# Patient Record
Sex: FEMALE | Race: WHITE | NOT HISPANIC OR LATINO | Employment: OTHER | URBAN - METROPOLITAN AREA
[De-identification: names, ages, dates, MRNs, and addresses within clinical notes are randomized per-mention and may not be internally consistent; named-entity substitution may affect disease eponyms.]

---

## 2017-09-05 ENCOUNTER — TRANSFERRED RECORDS (OUTPATIENT)
Dept: HEALTH INFORMATION MANAGEMENT | Facility: CLINIC | Age: 67
End: 2017-09-05

## 2017-11-01 ENCOUNTER — TRANSFERRED RECORDS (OUTPATIENT)
Dept: HEALTH INFORMATION MANAGEMENT | Facility: CLINIC | Age: 67
End: 2017-11-01

## 2017-11-22 DIAGNOSIS — L66.12 FRONTAL FIBROSING ALOPECIA: ICD-10-CM

## 2017-11-22 NOTE — TELEPHONE ENCOUNTER
Refill request received  for Finasteride. Unable to refill per RN refill protocol.    Jessie Philip RN

## 2017-11-22 NOTE — TELEPHONE ENCOUNTER
Per 5/30 chart note:    FFA  -Rogaine 5% QHS, foam, uses about twice a week  -Start finasteride 1mg QDay - reprots it may be working - would like to continue

## 2017-11-27 RX ORDER — FINASTERIDE 1 MG/1
1 TABLET, FILM COATED ORAL DAILY
Qty: 90 TABLET | Refills: 0 | Status: SHIPPED | OUTPATIENT
Start: 2017-11-27 | End: 2018-02-05

## 2017-11-28 NOTE — TELEPHONE ENCOUNTER
Received refill request for finasteride.  Chart reviewed.  Last note 5/2017 documents plan for conintued use.  Will renew with enough to last until next follow up 2/2018.    Radha Herndon MD  PGY-4, Dermatology

## 2018-01-18 ENCOUNTER — TELEPHONE (OUTPATIENT)
Dept: FAMILY MEDICINE | Facility: CLINIC | Age: 68
End: 2018-01-18

## 2018-01-18 NOTE — TELEPHONE ENCOUNTER
Patient called today.    Patient is requesting an appointment for provider Hector Thorne MD at [facility] for a 3 month followup.    Patient will be filling out BREA.    Please contact patient.    Thank you.    Central Scheduling  Alea SHELTON

## 2018-01-31 ENCOUNTER — TRANSFERRED RECORDS (OUTPATIENT)
Dept: HEALTH INFORMATION MANAGEMENT | Facility: CLINIC | Age: 68
End: 2018-01-31

## 2018-02-05 ENCOUNTER — OFFICE VISIT (OUTPATIENT)
Dept: DERMATOLOGY | Facility: CLINIC | Age: 68
End: 2018-02-05
Payer: COMMERCIAL

## 2018-02-05 VITALS — DIASTOLIC BLOOD PRESSURE: 84 MMHG | SYSTOLIC BLOOD PRESSURE: 164 MMHG | HEART RATE: 70 BPM

## 2018-02-05 DIAGNOSIS — L57.0 AK (ACTINIC KERATOSIS): ICD-10-CM

## 2018-02-05 DIAGNOSIS — R03.0 ELEVATED BLOOD PRESSURE READING: ICD-10-CM

## 2018-02-05 DIAGNOSIS — L66.12 FRONTAL FIBROSING ALOPECIA: Primary | ICD-10-CM

## 2018-02-05 DIAGNOSIS — L66.10 LICHEN PLANOPILARIS: ICD-10-CM

## 2018-02-05 RX ORDER — FINASTERIDE 1 MG/1
1 TABLET, FILM COATED ORAL DAILY
Qty: 90 TABLET | Refills: 1 | Status: SHIPPED | OUTPATIENT
Start: 2018-02-05 | End: 2018-08-29

## 2018-02-05 RX ORDER — TACROLIMUS 1 MG/G
OINTMENT TOPICAL
Qty: 60 G | Refills: 0 | Status: SHIPPED | OUTPATIENT
Start: 2018-02-05 | End: 2019-04-08

## 2018-02-05 ASSESSMENT — PAIN SCALES - GENERAL: PAINLEVEL: NO PAIN (0)

## 2018-02-05 NOTE — PATIENT INSTRUCTIONS
Cryotherapy    What is it?    Use of a very cold liquid, such as liquid nitrogen, to freeze and destroy abnormal skin cells that need to be removed    What should I expect?    Tenderness and redness    A small blister that might grow and fill with dark purple blood. There may be crusting.    More than one treatment may be needed if the lesions do not go away.    How do I care for the treated area?    Gently wash the area with your hands when bathing.    Use a thin layer of Vaseline to help with healing. You may use a Band-Aid.     The area should heal within 7-10 days and may leave behind a pink or lighter color.     Do not use an antibiotic or Neosporin ointment.     You may take acetaminophen (Tylenol) for pain.     Call your Doctor if you have:    Severe pain    Signs of infection (warmth, redness, cloudy yellow drainage, and or a bad smell)    Questions or concerns    Who should I call with questions?       [facility]: [phone]       [facility]: [phone]       For urgent needs outside of business hours call the [facility] at [phone]        and ask for the dermatology resident on call  Dr[facility] Follow-up:    If we are unable to schedule your appointment today, then you will receive a personal call from our clinic staff to schedule with Dr. Bush prior to your expected return.    Please, contact us with any questions via telephone.  We are not using [employer] to schedule appointments for this clinic at this the present time    How do I call with questions?       [facility]: [phone]       For urgent needs outside of business hours call the [facility] at [phone]        and ask for the dermatology resident on call

## 2018-02-05 NOTE — MR AVS SNAPSHOT
After Visit Summary   2/5/2018    Aleja Gudino    MRN: [id number]           Patient Information     Date Of Birth          1950        Visit Information        Provider Department      2/5/2018 10:10 AM Zoie Bush MD [facility] Dermatology        Today's Diagnoses     Frontal fibrosing alopecia    -  1    Lichen planopilaris        AK (actinic keratosis)          Care Instructions    Cryotherapy    What is it?    Use of a very cold liquid, such as liquid nitrogen, to freeze and destroy abnormal skin cells that need to be removed    What should I expect?    Tenderness and redness    A small blister that might grow and fill with dark purple blood. There may be crusting.    More than one treatment may be needed if the lesions do not go away.    How do I care for the treated area?    Gently wash the area with your hands when bathing.    Use a thin layer of Vaseline to help with healing. You may use a Band-Aid.     The area should heal within 7-10 days and may leave behind a pink or lighter color.     Do not use an antibiotic or Neosporin ointment.     You may take acetaminophen (Tylenol) for pain.     Call your Doctor if you have:    Severe pain    Signs of infection (warmth, redness, cloudy yellow drainage, and or a bad smell)    Questions or concerns    Who should I call with questions?       [facility]: [phone]       [facility]: [phone]       For urgent needs outside of business hours call the [facility] at [phone]        and ask for the dermatology resident on call  Dr[facility] Follow-up:    If we are unable to schedule your appointment today, then you will receive a personal call from our clinic staff to schedule with Dr. Bush prior to your expected return.    Please, contact us with any questions via telephone.  We are not using [employer] to schedule appointments for this clinic at  this the present time    How do I call with questions?       [facility]: [phone]       For urgent needs outside of business hours call the [facility] at [phone]        and ask for the dermatology resident on call              Follow-ups after your visit        Follow-up notes from your care team     Return in about 3 months (around 5/5/2018).      Your next 10 appointments already scheduled     Mar 05, 2018 11:00 AM CST   [facility]    [address] [place] [id number]   [phone]            Apr 23, 2018 11:00 AM CDT   (Arrive by 10:45 AM)   Return Visit with Ita Dillard PA-C   [facility] Dermatology [facility])    [address] [place] [id number] [phone]            May 11, 2018 10:00 AM CDT   (Arrive by 9:45 AM)   RETURN HAIRLOSS with Zoie Bush MD   [facility] Dermatology [facility])    [address] [place] [id number] [phone]              Who to contact     Please call your clinic at [phone] to:    Ask questions about your health    Make or cancel appointments    Discuss your medicines    Learn about your test results    Speak to your doctor   If you have compliments or concerns about an experience at your clinic, or if you wish to file a complaint, please contact [facility] Patient Relations at [phone] or email us at [email or web address]         Additional Information About Your Visit        MyChart Information     [employer] gives you secure access to your electronic health record. If you see a primary care provider, you can also send messages to your care team and make appointments. If you have questions, please call your primary care clinic.  If you do not have a primary care  provider, please call [phone] and they will assist you.      [employer] is an electronic gateway that provides easy, online access to your medical records. With [employer], you can request a clinic appointment, read your test results, renew a prescription or communicate with your care team.     To access your existing account, please contact your [facility] or call [phone] for assistance.        Care EveryWhere ID     This is your Care EveryWhere ID. This could be used by other organizations to access your [facility] medical records  [id number]        Your Vitals Were     Pulse                   70            Blood Pressure from Last 3 Encounters:   02/05/18 164/84   05/30/17 135/77   12/06/16 136/75    Weight from Last 3 Encounters:   12/21/15 70.3 kg (155 lb)   06/15/15 68 kg (150 lb)   03/19/15 63 kg (139 lb)              We Performed the Following     DESTRUCT PREMALIGNANT LESION, FIRST          Today's Medication Changes          These changes are accurate as of 2/5/18 11:32 AM.  If you have any questions, ask your nurse or doctor.               Start taking these medicines.        Dose/Directions    tacrolimus 0.1 % ointment   Commonly known as:  PROTOPIC   Used for:  Frontal fibrosing alopecia, Lichen planopilaris   Started by:  Zoie Bush MD        Use two times daily when flaring.   Quantity:  60 g   Refills:  0            Where to get your medicines      These medications were sent to [id number] [place], [place] - [address]  [address], [facility] [address]     Phone:  [phone]     finasteride 1 MG tablet    Minoxidil 5 % Foam    tacrolimus 0.1 % ointment                Primary Care Provider Office Phone # Fax #    Tanmay Arriola [phone]       [address] [address]        Equal Access to Services     EMERITA WOLF AH: Nils Gilmore  wapercyda luqadaha, qaybta kaalbucky benites, thuy neffaan ah. So [facility] [phone].    ATENCIÓN: Si bentley linares, tiene a bolanos disposición servicios gratuitos de asistencia lingüística. Navid al [phone].    We comply with applicable federal civil rights laws and [place] laws. We do not discriminate on the basis of race, color, national origin, age, disability, sex, sexual orientation, or gender identity.            Thank you!     Thank you for choosing [facility] DERMATOLOGY  for your care. Our goal is always to provide you with excellent care. Hearing back from our patients is one way we can continue to improve our services. Please take a few minutes to complete the written survey that you may receive in the mail after your visit with us. Thank you!             Your Updated Medication List - Protect others around you: Learn how to safely use, store and throw away your medicines at www.disposemymeds.org.          This list is accurate as of 2/5/18 11:32 AM.  Always use your most recent med list.                   Brand Name Dispense Instructions for use Diagnosis    aspirin 81 MG tablet      Take  by mouth daily.    Lichen planopilaris       ciclopirox 1 % Sham     1 Bottle    Apply to scalp, lather, leave on for 3 to 5 minutes, do up to 4 times per week    Dermatitis       clobetasol propionate 0.05 % Sham    CLOBEX    1 Bottle    Apply to a dry scalp, leave on for 10 minutes, then lather and rinse - do weekly    Dermatitis       diclofenac sodium 3 % Gel           FELDENE 10 MG capsule   Generic drug:  piroxicam      Take 10 mg by mouth daily.    Lichen planopilaris       finasteride 1 MG tablet    PROPECIA    90 tablet    Take 1 tablet (1 mg) by mouth daily    Frontal fibrosing alopecia       FLONASE 50 MCG/ACT spray   Generic drug:  fluticasone      Spray 2 sprays into both nostrils 2 times daily.    Lichen planopilaris       ketoconazole 2 % shampoo    NIZORAL    120 mL    Use  shampoo every other day alternating with OTC shampoo    Dermatitis, seborrheic       latanoprost 0.005 % ophthalmic solution    XALATAN     1 drop At Bedtime        losartan-hydrochlorothiazide 100-25 MG per tablet    HYZAAR     Take 1 tablet by mouth daily        Minoxidil 5 % Foam    ROGAINE MENS    60 g    Use on affected areas of scalp / eyebrows nightly.    Lichen planopilaris       pioglitazone 30 MG tablet    ACTOS     Take by mouth daily        priLOSEC 40 MG capsule   Generic drug:  omeprazole      Take  by mouth daily.    Lichen planopilaris       saxagliptin 5 MG Tabs tablet    ONGLYZA     Take by mouth daily        tacrolimus 0.1 % ointment    PROTOPIC    60 g    Use two times daily when flaring.    Frontal fibrosing alopecia, Lichen planopilaris       triamcinolone 0.1 % cream    KENALOG    45 g    Apply to affected symptomatic areas every other day    Lichen planopilaris, Dermatitis       vitamin D 2000 UNITS tablet      Take 1 tablet by mouth daily.    Lichen planopilaris

## 2018-02-05 NOTE — PROGRESS NOTES
"[facility] Dermatology Note      Dermatology Problem List:  1. Frontal fibrosing alopecia  - Previous tx:2%  ketoconazole shampoo once weekly (stopped 2/2 bumps/itching)  -Rogaine 5% QHS, foam, uses 2x/week  -Start finasteride 1mg QDay - reports it may be working - would like to continue  -on Actos (pioglitazone) for almost a year for diabetes, type II, may also be benefiting the FFA    2. History of AKs  3. Seborrheic keratoses    Encounter Date: Feb 5, 2018    CC:   Chief Complaint   Patient presents with     Derm Problem     Hair loss, Aleja states \" I always think it worse and I have a spot on my face.''         History of Present Illness:  Ms. Aleja Gudino is a 67 year old female who presents as a follow-up for FFA. The patient was last seen 5/30/17 when Rogaine and finasteride were continued. She was started on ketoconazole shampoo after her last visit for seborrheic dermatitis. She was only to use this once weekly. But shortly after starting she noticed bumps on the scalp, along the frontal hairline. Because of this she stopped the ketoconazole shampoo and is no longer using. She describes the bumps as red and itchy and they took a while to resolve, she thinks they stayed open for a long period of time. She had been applying neosporin to these open areas. Since stopping the ketoconazole she is no longer developing these bumps.    Since her last visit, she does feel that the disease overall is doing worse, meaning more loss of hair and more receeding of the hairline, particularly on the right side. She thinks the hair is thinner in the back of the scalp as well which is new for her. She is not sure if the rogaine is helping much. But she does feel that the finasteride has reduced symptoms in the scalp, much less itching since starting this medication. At this point, she does note that she is considering buying a wig. She does think her hair looks better with product. When she does use " product she uses: pureology clean volume shampoo and conditioner, pureology hydrate sheer conditioner, occasionally pureology volumizing moose.    Additionally today she has a pink papule on the right side of her face. Has had it for several months, maybe even up to a year. It has started to bother her in the last few months, has noticed the appearance and it is minimally tender. Therefore, she thought she would ask to have it evaluated today.    Past Medical History:   Patient Active Problem List   Diagnosis     Cicatricial alopecia     Actinic keratosis     Dermatitis     Frontal fibrosing alopecia     Dermatitis, seborrheic     Past Medical History:   Diagnosis Date     Diabetes (H)      Frontal fibrosing alopecia      Glaucoma      Hair loss      Hypertension      Sleep apnea     c-pap     Thyroid nodule      Weight loss      Past Surgical History:   Procedure Laterality Date     BIOPSY OF SKIN LESION       CARPAL TUNNEL RELEASE RT/LT        SECTION       CHOLECYSTECTOMY       COLONOSCOPY N/A 3/19/2015    Procedure: COLONOSCOPY;  Surgeon: Ck Childress MD;  Location: [facility]     Excision oral melanotic macule  2013    lower right lip     HC LAP,FULGURATE/EXCISE LESIONS      tumors removed and colon resection     KNEE SURGERY      arthroscopic- left knee     NO HISTORY OF SURGERY  13    derm     ORTHOPEDIC SURGERY      right and left knee replacements     RELEASE TRIGGER FINGER       RESECTION ABDOMINAL PERINEAL         Medications:  Current Outpatient Prescriptions   Medication Sig Dispense Refill     finasteride (PROPECIA) 1 MG tablet Take 1 tablet (1 mg) by mouth daily 90 tablet 0     losartan-hydrochlorothiazide (HYZAAR) 100-25 MG per tablet Take 1 tablet by mouth daily       latanoprost (XALATAN) 0.005 % ophthalmic solution 1 drop At Bedtime       diclofenac sodium 3 % GEL        omeprazole (PRILOSEC) 40 MG capsule Take  by mouth daily.       fluticasone (FLONASE) 50 MCG/ACT nasal  spray Spray 2 sprays into both nostrils 2 times daily.       Cholecalciferol (VITAMIN D) 2000 UNITS tablet Take 1 tablet by mouth daily.       aspirin 81 MG tablet Take  by mouth daily.       Minoxidil (ROGAINE MENS) 5 % FOAM Use on affected areas of scalp / eyebrows nightly. 60 g 11     pioglitazone (ACTOS) 30 MG tablet Take by mouth daily       ketoconazole (NIZORAL) 2 % shampoo Use shampoo every other day alternating with OTC shampoo (Patient not taking: Reported on 2/5/2018) 120 mL 5     triamcinolone (KENALOG) 0.1 % cream Apply to affected symptomatic areas every other day (Patient not taking: Reported on 2/5/2018) 45 g 1     saxagliptin (ONGLYZA) 5 MG TABS Take by mouth daily       ciclopirox 1 % SHAM Apply to scalp, lather, leave on for 3 to 5 minutes, do up to 4 times per week (Patient not taking: Reported on 2/5/2018) 1 Bottle 6     clobetasol propionate (CLOBEX) 0.05 % SHAM Apply to a dry scalp, leave on for 10 minutes, then lather and rinse - do weekly (Patient not taking: Reported on 2/5/2018) 1 Bottle 2     piroxicam (FELDENE) 10 MG capsule Take 10 mg by mouth daily.          Allergies   Allergen Reactions     Metformin Diarrhea       Review of Systems:  -As per HPI  -Constitutional: The patient denies fatigue, fevers, chills, or recent illnesses  -Skin: As above in HPI. No additional skin concerns.    Physical exam:  Vitals: /84  Pulse 70  GEN: This is a well developed, well-nourished female in no acute distress, in a pleasant mood.    SKIN: Focused examination of the face, neck, scalp was performed.  -Pink papule with adherent scale on the right temple just lateral to the lateral canthus  -LPPAI score: 2.17  -Part Width 1.2   -Hair regrowth: first layer robust 2cm, second layer 3cm  -Mild perifollicular erythema and scale particularly at the receeding frontal hairline  -Round patch of erythema just inferior to the vertex scalp  -No characteristic facial papules of LPP  -No other lesions of  concern on areas examined.     Impression/Plan:  1. Frontal fibrosing alopecia, mild spreading on the right temporal scalp and new pink minimally inflamed patch in the posterior scalp. Given findings today would recommend she continue with both the finasteride and the minoxidil. However, she may benefit from resuming the laser comb treatment as the inflammation today is minimal. Additionally, given the history of itchy papules, we will recommend using protopic for this as opposed to topical neosporin.    Resume low level laser comb    Continue finasteride and minoxidil, refilled today    Start protopic ointment PRN for red papules    2. Actinic keratosis, right temple    Reviewed potentially premalignant etiology of this skin finding  Cryotherapy procedure note: After verbal consent and discussion of risks and benefits including but no limited to dyspigmentation/scar, blister, and pain, 1 was treated with 1-2mm freeze border for 2 cycles with liquid nitrogen. Post cryotherapy instructions were provided.     3. Elevated blood pressure; follow-up with primary care    CC Dr. Arriola on close of this encounter.  Follow-up in 6 months, earlier for new or changing lesions.       Dr. Bush staffed the patient.    Staff Involved:  Resident(Ezra Arceo)/Staff(as above)      Patient was seen and examined with the dermatology resident. I agree with the history, review of systems, physical examination, assessments and plan. I was present for the entire duration of the procedure.    Zoie Bush MD  Professor and  Chair  Department of Dermatology  [facility]

## 2018-02-05 NOTE — LETTER
"2/5/2018       RE: Aleja Gudino  [address]  [facility] [address]     Dear Colleague,    Thank you for referring your patient, Aleja Gudino, to the [facility] DERMATOLOGY at [facility]. Please see a copy of my visit note below.    [facility] Dermatology Note      Dermatology Problem List:  1. Frontal fibrosing alopecia  - Previous tx:2%  ketoconazole shampoo once weekly (stopped 2/2 bumps/itching)  -Rogaine 5% QHS, foam, uses 2x/week  -Start finasteride 1mg QDay - reports it may be working - would like to continue  -on Actos (pioglitazone) for almost a year for diabetes, type II, may also be benefiting the FFA    2. History of AKs  3. Seborrheic keratoses    Encounter Date: Feb 5, 2018    CC:   Chief Complaint   Patient presents with     Derm Problem     Hair loss, Aleja states \" I always think it worse and I have a spot on my face.''         History of Present Illness:  Ms. Aleja Gudino is a 67 year old female who presents as a follow-up for FFA. The patient was last seen 5/30/17 when Rogaine and finasteride were continued. She was started on ketoconazole shampoo after her last visit for seborrheic dermatitis. She was only to use this once weekly. But shortly after starting she noticed bumps on the scalp, along the frontal hairline. Because of this she stopped the ketoconazole shampoo and is no longer using. She describes the bumps as red and itchy and they took a while to resolve, she thinks they stayed open for a long period of time. She had been applying neosporin to these open areas. Since stopping the ketoconazole she is no longer developing these bumps.    Since her last visit, she does feel that the disease overall is doing worse, meaning more loss of hair and more receeding of the hairline, particularly on the right side. She thinks the hair is thinner in the back of the scalp as well which is new for her. She is not sure if the rogaine is " helping much. But she does feel that the finasteride has reduced symptoms in the scalp, much less itching since starting this medication. At this point, she does note that she is considering buying a wig. She does think her hair looks better with product. When she does use product she uses: pureology clean volume shampoo and conditioner, pureology hydrate sheer conditioner, occasionally pureology volumizing moose.    Additionally today she has a pink papule on the right side of her face. Has had it for several months, maybe even up to a year. It has started to bother her in the last few months, has noticed the appearance and it is minimally tender. Therefore, she thought she would ask to have it evaluated today.    Past Medical History:   Patient Active Problem List   Diagnosis     Cicatricial alopecia     Actinic keratosis     Dermatitis     Frontal fibrosing alopecia     Dermatitis, seborrheic     Past Medical History:   Diagnosis Date     Diabetes (H)      Frontal fibrosing alopecia      Glaucoma      Hair loss      Hypertension      Sleep apnea     c-pap     Thyroid nodule      Weight loss      Past Surgical History:   Procedure Laterality Date     BIOPSY OF SKIN LESION       CARPAL TUNNEL RELEASE RT/LT        SECTION       CHOLECYSTECTOMY       COLONOSCOPY N/A 3/19/2015    Procedure: COLONOSCOPY;  Surgeon: Ck Childress MD;  Location: [facility]     Excision oral melanotic macule  2013    lower right lip     HC LAP,FULGURATE/EXCISE LESIONS      tumors removed and colon resection     KNEE SURGERY      arthroscopic- left knee     NO HISTORY OF SURGERY  13    derm     ORTHOPEDIC SURGERY      right and left knee replacements     RELEASE TRIGGER FINGER       RESECTION ABDOMINAL PERINEAL         Medications:  Current Outpatient Prescriptions   Medication Sig Dispense Refill     finasteride (PROPECIA) 1 MG tablet Take 1 tablet (1 mg) by mouth daily 90 tablet 0     losartan-hydrochlorothiazide  (HYZAAR) 100-25 MG per tablet Take 1 tablet by mouth daily       latanoprost (XALATAN) 0.005 % ophthalmic solution 1 drop At Bedtime       diclofenac sodium 3 % GEL        omeprazole (PRILOSEC) 40 MG capsule Take  by mouth daily.       fluticasone (FLONASE) 50 MCG/ACT nasal spray Spray 2 sprays into both nostrils 2 times daily.       Cholecalciferol (VITAMIN D) 2000 UNITS tablet Take 1 tablet by mouth daily.       aspirin 81 MG tablet Take  by mouth daily.       Minoxidil (ROGAINE MENS) 5 % FOAM Use on affected areas of scalp / eyebrows nightly. 60 g 11     pioglitazone (ACTOS) 30 MG tablet Take by mouth daily       ketoconazole (NIZORAL) 2 % shampoo Use shampoo every other day alternating with OTC shampoo (Patient not taking: Reported on 2/5/2018) 120 mL 5     triamcinolone (KENALOG) 0.1 % cream Apply to affected symptomatic areas every other day (Patient not taking: Reported on 2/5/2018) 45 g 1     saxagliptin (ONGLYZA) 5 MG TABS Take by mouth daily       ciclopirox 1 % SHAM Apply to scalp, lather, leave on for 3 to 5 minutes, do up to 4 times per week (Patient not taking: Reported on 2/5/2018) 1 Bottle 6     clobetasol propionate (CLOBEX) 0.05 % SHAM Apply to a dry scalp, leave on for 10 minutes, then lather and rinse - do weekly (Patient not taking: Reported on 2/5/2018) 1 Bottle 2     piroxicam (FELDENE) 10 MG capsule Take 10 mg by mouth daily.          Allergies   Allergen Reactions     Metformin Diarrhea       Review of Systems:  -As per HPI  -Constitutional: The patient denies fatigue, fevers, chills, or recent illnesses  -Skin: As above in HPI. No additional skin concerns.    Physical exam:  Vitals: /84  Pulse 70  GEN: This is a well developed, well-nourished female in no acute distress, in a pleasant mood.    SKIN: Focused examination of the face, neck, scalp was performed.  -Pink papule with adherent scale on the right temple just lateral to the lateral canthus  -LPPAI score: 2.17  -Part Width 1.2    -Hair regrowth: first layer robust 2cm, second layer 3cm  -Mild perifollicular erythema and scale particularly at the receeding frontal hairline  -Round patch of erythema just inferior to the vertex scalp  -No characteristic facial papules of LPP  -No other lesions of concern on areas examined.     Impression/Plan:  1. Frontal fibrosing alopecia, mild spreading on the right temporal scalp and new pink minimally inflamed patch in the posterior scalp. Given findings today would recommend she continue with both the finasteride and the minoxidil. However, she may benefit from resuming the laser comb treatment as the inflammation today is minimal. Additionally, given the history of itchy papules, we will recommend using protopic for this as opposed to topical neosporin.    Resume low level laser comb    Continue finasteride and minoxidil, refilled today    Start protopic ointment PRN for red papules    2. Actinic keratosis, right temple    Reviewed potentially premalignant etiology of this skin finding  Cryotherapy procedure note: After verbal consent and discussion of risks and benefits including but no limited to dyspigmentation/scar, blister, and pain, 1 was treated with 1-2mm freeze border for 2 cycles with liquid nitrogen. Post cryotherapy instructions were provided.     3. Elevated blood pressure; follow-up with primary care    CC Dr. Arriola on close of this encounter.  Follow-up in 6 months, earlier for new or changing lesions.       Dr. Bush staffed the patient.    Staff Involved:  Resident(Ezra Arceo)/Staff(as above)      Patient was seen and examined with the dermatology resident. I agree with the history, review of systems, physical examination, assessments and plan. I was present for the entire duration of the procedure.    Zoie Bush MD  Professor and  Chair  Department of Dermatology  [facility]                                    Again, thank you for allowing me to  participate in the care of your patient.      Sincerely,    Zoie Bush MD

## 2018-02-05 NOTE — NURSING NOTE
"Dermatology Rooming Note    Aleja Gudino's goals for this visit include:   Chief Complaint   Patient presents with     Derm Problem     Hair loss, Aleja states \" I always think it worse and I have a spot on my face.''     Baylee Garcia LPN  "

## 2018-02-06 ENCOUNTER — TELEPHONE (OUTPATIENT)
Dept: DERMATOLOGY | Facility: CLINIC | Age: 68
End: 2018-02-06

## 2018-02-08 NOTE — TELEPHONE ENCOUNTER
Central Prior Authorization Team   Phone: [phone]    PRIOR AUTHORIZATION DENIED    Medication: tacrolimus (PROTOPIC) 0.1 % ointment - DENIED    Denial Date: 2/7/2018    Denial Rational: INS COVERS IF PATIENT HAS ATOPIC DERMATITIS, FACIAL OR FLEXURAL PSORIASIS.          Appeal Information:

## 2018-02-19 PROBLEM — R03.0 ELEVATED BLOOD PRESSURE READING: Status: ACTIVE | Noted: 2018-02-19

## 2018-02-20 ENCOUNTER — TRANSFERRED RECORDS (OUTPATIENT)
Dept: MULTI SPECIALTY CLINIC | Facility: CLINIC | Age: 68
End: 2018-02-20

## 2018-02-20 LAB
CHOLEST SERPL-MCNC: 172 MG/DL (ref 100–199)
HDLC SERPL-MCNC: 61 MG/DL
LDLC SERPL CALC-MCNC: 96 MG/DL
NONHDLC SERPL-MCNC: 111 MG/DL
TRIGL SERPL-MCNC: 73 MG/DL

## 2018-03-05 ENCOUNTER — MYC MEDICAL ADVICE (OUTPATIENT)
Dept: FAMILY MEDICINE | Facility: CLINIC | Age: 68
End: 2018-03-05

## 2018-03-05 ENCOUNTER — OFFICE VISIT (OUTPATIENT)
Dept: ENDOCRINOLOGY | Facility: CLINIC | Age: 68
End: 2018-03-05
Payer: COMMERCIAL

## 2018-03-05 VITALS
SYSTOLIC BLOOD PRESSURE: 142 MMHG | HEIGHT: 63 IN | BODY MASS INDEX: 30.48 KG/M2 | WEIGHT: 172 LBS | DIASTOLIC BLOOD PRESSURE: 82 MMHG | HEART RATE: 74 BPM

## 2018-03-05 DIAGNOSIS — R30.0 DYSURIA: ICD-10-CM

## 2018-03-05 DIAGNOSIS — E11.9 TYPE 2 DIABETES MELLITUS WITHOUT COMPLICATION, WITHOUT LONG-TERM CURRENT USE OF INSULIN (H): Primary | ICD-10-CM

## 2018-03-05 LAB
ALBUMIN UR-MCNC: NEGATIVE MG/DL
ALT SERPL W P-5'-P-CCNC: 17 U/L (ref 0–50)
ANION GAP SERPL CALCULATED.3IONS-SCNC: 7 MMOL/L (ref 3–14)
APPEARANCE UR: ABNORMAL
BACTERIA #/AREA URNS HPF: ABNORMAL /HPF
BILIRUB UR QL STRIP: NEGATIVE
BUN SERPL-MCNC: 26 MG/DL (ref 7–30)
CALCIUM SERPL-MCNC: 9.4 MG/DL (ref 8.5–10.1)
CHLORIDE SERPL-SCNC: 101 MMOL/L (ref 94–109)
CO2 SERPL-SCNC: 28 MMOL/L (ref 20–32)
COLOR UR AUTO: YELLOW
CREAT SERPL-MCNC: 0.64 MG/DL (ref 0.52–1.04)
GFR SERPL CREATININE-BSD FRML MDRD: >90 ML/MIN/1.7M2
GLUCOSE SERPL-MCNC: 135 MG/DL (ref 70–99)
GLUCOSE UR STRIP-MCNC: NEGATIVE MG/DL
HBA1C MFR BLD: 7.6 % (ref 4.3–6)
HGB UR QL STRIP: ABNORMAL
KETONES UR STRIP-MCNC: NEGATIVE MG/DL
LEUKOCYTE ESTERASE UR QL STRIP: ABNORMAL
NITRATE UR QL: NEGATIVE
NON-SQ EPI CELLS #/AREA URNS LPF: ABNORMAL /LPF
PH UR STRIP: 6 PH (ref 5–7)
POTASSIUM SERPL-SCNC: 4.3 MMOL/L (ref 3.4–5.3)
RBC #/AREA URNS AUTO: ABNORMAL /HPF
SODIUM SERPL-SCNC: 136 MMOL/L (ref 133–144)
SOURCE: ABNORMAL
SP GR UR STRIP: <=1.005 (ref 1–1.03)
UROBILINOGEN UR STRIP-ACNC: 0.2 EU/DL (ref 0.2–1)
WBC #/AREA URNS AUTO: >100 /HPF

## 2018-03-05 PROCEDURE — 80048 BASIC METABOLIC PNL TOTAL CA: CPT | Performed by: INTERNAL MEDICINE

## 2018-03-05 PROCEDURE — 87088 URINE BACTERIA CULTURE: CPT | Performed by: INTERNAL MEDICINE

## 2018-03-05 PROCEDURE — 87086 URINE CULTURE/COLONY COUNT: CPT | Performed by: INTERNAL MEDICINE

## 2018-03-05 PROCEDURE — 81001 URINALYSIS AUTO W/SCOPE: CPT | Performed by: INTERNAL MEDICINE

## 2018-03-05 PROCEDURE — 87186 SC STD MICRODIL/AGAR DIL: CPT | Performed by: INTERNAL MEDICINE

## 2018-03-05 PROCEDURE — 36415 COLL VENOUS BLD VENIPUNCTURE: CPT | Performed by: INTERNAL MEDICINE

## 2018-03-05 PROCEDURE — 84460 ALANINE AMINO (ALT) (SGPT): CPT | Performed by: INTERNAL MEDICINE

## 2018-03-05 PROCEDURE — 83036 HEMOGLOBIN GLYCOSYLATED A1C: CPT | Performed by: INTERNAL MEDICINE

## 2018-03-05 PROCEDURE — 99214 OFFICE O/P EST MOD 30 MIN: CPT | Performed by: INTERNAL MEDICINE

## 2018-03-05 RX ORDER — LANCETS
EACH MISCELLANEOUS 2 TIMES DAILY
Refills: 2 | COMMUNITY
Start: 2017-10-22 | End: 2018-10-23

## 2018-03-05 RX ORDER — BLOOD SUGAR DIAGNOSTIC
STRIP MISCELLANEOUS
Refills: 1 | COMMUNITY
Start: 2017-11-07 | End: 2018-09-27

## 2018-03-05 RX ORDER — LINAGLIPTIN 5 MG/1
5 TABLET, FILM COATED ORAL DAILY
Refills: 1 | COMMUNITY
Start: 2017-10-18 | End: 2018-03-15

## 2018-03-05 NOTE — PROGRESS NOTES
Name: Aleja Gudino is a 67 year old woman, self-referred for evaluation of diabetes mellitus.  Previously seen by me at my former clinic ([facility]), here to establish care with [facility].    HPI:  Recent issues:  Feeling well overall, no new health issues reported  Had recent vacations to [facility], also [place]        . Diagnosis of diabetes mellitus  Initial management with diet plan  . Began metformin medication, but GI intolerance  Has tried Onglyza, Victoza, pioglitazone, glimeparide, basal insulin meds previously  Current DM meds:   Tradjenta 5 mg po QAM  Using Accucheck Kelly meter, testing 2x/day   Recent trends am 113-185, overall avg 161 mg/dl   Comparison trends:  136 mg/dl on 17  Participating with [employer] weight loss program  18 labs:  T.chol 172, TG 73, HDL 61, LDL 96; hgb 13.7, vit B12 718  DM Complications: none known  Last eye exam 2018    Sees Dr. Ten Arriola/[facility] for general medicine evaluations.    PMH/PSH:  Past Medical History:   Diagnosis Date     Diabetes (H)      Frontal fibrosing alopecia      Glaucoma      Hair loss      Hypertension      Sleep apnea     c-pap     Thyroid nodule      Weight loss      Past Surgical History:   Procedure Laterality Date     BIOPSY OF SKIN LESION       CARPAL TUNNEL RELEASE RT/LT        SECTION       CHOLECYSTECTOMY       COLONOSCOPY N/A 3/19/2015    Procedure: COLONOSCOPY;  Surgeon: Ck Childress MD;  Location: [facility]     Excision oral melanotic macule  2013    lower right lip     HC LAP,FULGURATE/EXCISE LESIONS      tumors removed and colon resection     KNEE SURGERY      arthroscopic- left knee     NO HISTORY OF SURGERY  13    derm     ORTHOPEDIC SURGERY      right and left knee replacements     RELEASE TRIGGER FINGER       RESECTION ABDOMINAL PERINEAL         Family Hx:  Family History   Problem Relation Age of Onset  "    CANCER No family hx of      No family history of skin cancer     Melanoma No family hx of      Skin Cancer No family hx of          Social Hx:  Social History     Social History     Marital status:      Spouse name: N/A     Number of children: N/A     Years of education: N/A     Occupational History     Not on file.     Social History Main Topics     Smoking status: Never Smoker     Smokeless tobacco: Never Used     Alcohol use No     Drug use: No     Sexual activity: Not on file     Other Topics Concern     Not on file     Social History Narrative          MEDICATIONS:  has a current medication list which includes the following prescription(s): tradjenta, accu-chek pedro pablo plus, blood glucose monitoring, losartan-hydrochlorothiazide, omeprazole, finasteride, minoxidil, tacrolimus, triamcinolone, losartan-hydrochlorothiazide, latanoprost, diclofenac sodium, omeprazole, piroxicam, fluticasone, vitamin d, and aspirin.    ROS:     ROS: 10 point ROS neg other than the symptoms noted above in the HPI.    GENERAL: no weight changes, fatigue, fevers, chills, night sweats.   HEENT: no dysphagia, odonophagia, diplopia, neck pain  THYROID:  no apparent hyper or hypothyroid symptoms  CV: no chest pain, pressure, palpitations  LUNGS: no SOB, SIDHU, cough, wheezing   ABDOMEN: no diarrhea, constipation, abdominal pain  EXTREMITIES: no rashes, ulcers, edema  NEUROLOGY: no headaches, denies changes in vision, tingling, extremitiy numbness   MSK: no muscle aches or pains, weakness  SKIN: no rashes or lesions  : no menses; denies hot flashes  PSYCH:  stable mood, no significant anxiety or depression  ENDOCRINE: no heat or cold intolerance    Physical Exam   VS: /82 (BP Location: Right arm, Cuff Size: Adult Large)  Pulse 74  Ht 5' 3\" (1.6 m)  Wt 172 lb (78 kg)  BMI 30.47 kg/m2  GENERAL: AXOX3, NAD, well dressed, answering questions appropriately, appears stated age.  THYROID:  normal gland, no apparent nodules or " goiter  HEENT: neck non-tender, no exopthalmous, no proptosis, EOMI  CV: RRR, no rubs, gallops, no murmurs  LUNGS: CTAB, no wheezes, rales, or ronchi  ABDOMEN: mildly obese, soft, nontender, nondistended  EXTREMITIES: no edema, +pedal pulses, no lesions  NEUROLOGY: CN grossly intact, no tremors  MSK: grossly intact  SKIN: scalp hair thinning; denies rashes    LABS:    All pertinent notes, labs, and images personally reviewed by me.     A/P:  Encounter Diagnoses   Name Primary?     Type 2 diabetes mellitus without complication, without long-term current use of insulin (H) Yes     Dysuria        Comments:  I am pleased patient feeling well, though urinary symptoms suspicious of UTI.  Mild rise in BG trends noted    Plan:  Discussed general issues with the diabetes diagnosis and management  We have reviewed the pathophysiology of T2DM  Discussed the hgbA1c test which reflects previous overall glucose levels or control  Discussed the importance of blood glucose (BG) testing to assess glucose trends  Provided general overview of the diabetes medication options and medication treatment plan.    Recommend:  Continue Tradjenta but add pioglitazone 15 mg po QAM  Monitor BG trends, then message me with [employer] update soon  Check labs today including hgbA1c, urine sample  Discussed future option of change from Tradjenta to Victoza, reviewed Victoza pen dosing technique    Keep focus on diet, exercise, weight management.  Advise having fasting lipid panel testing and dilated eye examination, at least annually    Addressed patient questions today    Labs ordered today:   Orders Placed This Encounter   Procedures     Hemoglobin A1c     Basic metabolic panel  (Ca, Cl, CO2, Creat, Gluc, K, Na, BUN)     ALT     UA with Microscopic reflex to Culture     Radiology/Consults ordered today: None    More than 50% of the time spent with Ms. Gudino on counseling / coordinating her care.  Total appointment time was 25 minutes.    Follow-up:   3 mo    Hector Thorne MD  Endocrinology  [facility]

## 2018-03-05 NOTE — LETTER
3/5/2018         RE: Aleja Gudino  [address]  [facility] [address]        Dear Colleague,    Thank you for referring your patient, Aleja Gudino, to the [facility]. Please see a copy of my visit note below.    Name: Aleja Gudino is a 67 year old woman, self-referred for evaluation of diabetes mellitus.  Previously seen by me at my former clinic ([facility]), here to establish care with [facility].    HPI:  Recent issues:  Feeling well overall, no new health issues reported  Had recent vacations to [facility], also [place]        . Diagnosis of diabetes mellitus  Initial management with diet plan  . Began metformin medication, but GI intolerance  Has tried Onglyza, Victoza, pioglitazone, glimeparide, basal insulin meds previously  Current DM meds:   Tradjenta 5 mg po QAM  Using Accucheck Kelly meter, testing 2x/day   Recent trends am 113-185, overall avg 161 mg/dl   Comparison trends:  136 mg/dl on 17  Participating with [employer] weight loss program  18 labs:  T.chol 172, TG 73, HDL 61, LDL 96; hgb 13.7, vit B12 718  DM Complications: none known  Last eye exam 2018    Sees Dr. Ten Arriola/[facility] for general medicine evaluations.    PMH/PSH:  Past Medical History:   Diagnosis Date     Diabetes (H)      Frontal fibrosing alopecia      Glaucoma      Hair loss      Hypertension      Sleep apnea     c-pap     Thyroid nodule      Weight loss      Past Surgical History:   Procedure Laterality Date     BIOPSY OF SKIN LESION       CARPAL TUNNEL RELEASE RT/LT        SECTION       CHOLECYSTECTOMY       COLONOSCOPY N/A 3/19/2015    Procedure: COLONOSCOPY;  Surgeon: Ck Childress MD;  Location: [facility]     Excision oral melanotic macule  2013    lower right lip     HC LAP,FULGURATE/EXCISE LESIONS      tumors removed and colon resection     KNEE SURGERY      arthroscopic- left knee      NO HISTORY OF SURGERY  6/18/13    derm     ORTHOPEDIC SURGERY      right and left knee replacements     RELEASE TRIGGER FINGER       RESECTION ABDOMINAL PERINEAL         Family Hx:  Family History   Problem Relation Age of Onset     CANCER No family hx of      No family history of skin cancer     Melanoma No family hx of      Skin Cancer No family hx of          Social Hx:  Social History     Social History     Marital status:      Spouse name: N/A     Number of children: N/A     Years of education: N/A     Occupational History     Not on file.     Social History Main Topics     Smoking status: Never Smoker     Smokeless tobacco: Never Used     Alcohol use No     Drug use: No     Sexual activity: Not on file     Other Topics Concern     Not on file     Social History Narrative          MEDICATIONS:  has a current medication list which includes the following prescription(s): tradjenta, accu-chek pedro pablo plus, blood glucose monitoring, losartan-hydrochlorothiazide, omeprazole, finasteride, minoxidil, tacrolimus, triamcinolone, losartan-hydrochlorothiazide, latanoprost, diclofenac sodium, omeprazole, piroxicam, fluticasone, vitamin d, and aspirin.    ROS:     ROS: 10 point ROS neg other than the symptoms noted above in the HPI.    GENERAL: no weight changes, fatigue, fevers, chills, night sweats.   HEENT: no dysphagia, odonophagia, diplopia, neck pain  THYROID:  no apparent hyper or hypothyroid symptoms  CV: no chest pain, pressure, palpitations  LUNGS: no SOB, SIDHU, cough, wheezing   ABDOMEN: no diarrhea, constipation, abdominal pain  EXTREMITIES: no rashes, ulcers, edema  NEUROLOGY: no headaches, denies changes in vision, tingling, extremitiy numbness   MSK: no muscle aches or pains, weakness  SKIN: no rashes or lesions  : no menses; denies hot flashes  PSYCH:  stable mood, no significant anxiety or depression  ENDOCRINE: no heat or cold intolerance    Physical Exam   VS: /82 (BP Location: Right arm,  "Cuff Size: Adult Large)  Pulse 74  Ht 5' 3\" (1.6 m)  Wt 172 lb (78 kg)  BMI 30.47 kg/m2  GENERAL: AXOX3, NAD, well dressed, answering questions appropriately, appears stated age.  THYROID:  normal gland, no apparent nodules or goiter  HEENT: neck non-tender, no exopthalmous, no proptosis, EOMI  CV: RRR, no rubs, gallops, no murmurs  LUNGS: CTAB, no wheezes, rales, or ronchi  ABDOMEN: mildly obese, soft, nontender, nondistended  EXTREMITIES: no edema, +pedal pulses, no lesions  NEUROLOGY: CN grossly intact, no tremors  MSK: grossly intact  SKIN: scalp hair thinning; denies rashes    LABS:    All pertinent notes, labs, and images personally reviewed by me.     A/P:  Encounter Diagnoses   Name Primary?     Type 2 diabetes mellitus without complication, without long-term current use of insulin (H) Yes     Dysuria        Comments:  I am pleased patient feeling well, though urinary symptoms suspicious of UTI.  Mild rise in BG trends noted    Plan:  Discussed general issues with the diabetes diagnosis and management  We have reviewed the pathophysiology of T2DM  Discussed the hgbA1c test which reflects previous overall glucose levels or control  Discussed the importance of blood glucose (BG) testing to assess glucose trends  Provided general overview of the diabetes medication options and medication treatment plan.    Recommend:  Continue Tradjenta but add pioglitazone 15 mg po QAM  Monitor BG trends, then message me with MyChart update soon  Check labs today including hgbA1c, urine sample  Discussed future option of change from Tradjenta to Victoza, reviewed Victoza pen dosing technique    Keep focus on diet, exercise, weight management.  Advise having fasting lipid panel testing and dilated eye examination, at least annually    Addressed patient questions today    Labs ordered today:   Orders Placed This Encounter   Procedures     Hemoglobin A1c     Basic metabolic panel  (Ca, Cl, CO2, Creat, Gluc, K, Na, BUN)     " ALT     UA with Microscopic reflex to Culture     Radiology/Consults ordered today: None    More than 50% of the time spent with Ms. Gudino on counseling / coordinating her care.  Total appointment time was 25 minutes.    Follow-up:  3 mo    Hector Thorne MD  Endocrinology  [facility]        Again, thank you for allowing me to participate in the care of your patient.        Sincerely,        Hector Thorne MD

## 2018-03-05 NOTE — NURSING NOTE
"Chief Complaint   Patient presents with     Diabetes       Initial /82 (BP Location: Right arm, Cuff Size: Adult Large)  Pulse 74  Ht 5' 3\" (1.6 m)  Wt 172 lb (78 kg)  BMI 30.47 kg/m2 Estimated body mass index is 30.47 kg/(m^2) as calculated from the following:    Height as of this encounter: 5' 3\" (1.6 m).    Weight as of this encounter: 172 lb (78 kg).  Medication Reconciliation: complete         Cinthia Lemon      "

## 2018-03-05 NOTE — PATIENT INSTRUCTIONS
Discussed DM med options  Try the Tradjenta with low dose pioglitazone for next week, then message to Dr. TRIVEDI  Future options with change to Victoza

## 2018-03-05 NOTE — MR AVS SNAPSHOT
After Visit Summary   3/5/2018    Aleja Gudino    MRN: [id number]           Patient Information     Date Of Birth          1950        Visit Information        Provider Department      3/5/2018 11:00 AM Hector Thorne MD [facility]        Today's Diagnoses     Type 2 diabetes mellitus without complication, without long-term current use of insulin (H)    -  1    Dysuria          Care Instructions    Discussed DM med options  Try the Tradjenta with low dose pioglitazone for next week, then message to Dr. TRIVEDI  Future options with change to Victoza          Follow-ups after your visit        Follow-up notes from your care team     Return in about 3 months (around 6/5/2018).      Your next 10 appointments already scheduled     Apr 23, 2018 11:00 AM CDT   (Arrive by 10:45 AM)   Return Visit with Ita Dillard PA-C   [facility])    [address] [place] [address] [phone]            May 11, 2018 10:00 AM CDT   (Arrive by 9:45 AM)   RETURN HAIRLOSS with Zoie Bush MD   [facility])    [address] [place] [address] [phone]              Who to contact     If you have questions or need follow up information about today's clinic visit or your schedule please contact [facility] directly at [phone].  Normal or non-critical lab and imaging results will be communicated to you by MyChart, letter or phone within 4 business days after the clinic has received the results. If you do not hear from us within 7 days, please contact the clinic through MyChart or phone. If you have a critical or abnormal lab result, we will notify you by phone as soon as possible.  Submit refill requests through [employer] or call your pharmacy and they will forward the refill request to us. Please allow 3 business days for  "your refill to be completed.          Additional Information About Your Visit        MyChart Information     [employer] gives you secure access to your electronic health record. If you see a primary care provider, you can also send messages to your care team and make appointments. If you have questions, please call your primary care clinic.  If you do not have a primary care provider, please call [phone] and they will assist you.        Care EveryWhere ID     This is your Care EveryWhere ID. This could be used by other organizations to access your [place] medical records  [id number]        Your Vitals Were     Pulse Height BMI (Body Mass Index)             74 5' 3\" (1.6 m) 30.47 kg/m2          Blood Pressure from Last 3 Encounters:   03/05/18 142/82   02/05/18 164/84   05/30/17 135/77    Weight from Last 3 Encounters:   03/05/18 172 lb (78 kg)   12/21/15 155 lb (70.3 kg)   06/15/15 150 lb (68 kg)              We Performed the Following     ALT     Basic metabolic panel  (Ca, Cl, CO2, Creat, Gluc, K, Na, BUN)     Hemoglobin A1c     UA with Microscopic reflex to Culture        Primary Care Provider Office Phone # Fax #    Tanmay Theodore Arriola [phone]       [place] [address]        Equal Access to Services     EMERITA WOLF : Hadii aad ku hadasho Soomaali, waaxda luqadaha, qaybta kaalmada adeegyada, thuy borges. So [facility] [phone].    ATENCIÓN: Si habla español, tiene a bolanos disposición servicios gratuitos de asistencia lingüística. Llame al [phone].    We comply with applicable federal civil rights laws and [place] laws. We do not discriminate on the basis of race, color, national origin, age, disability, sex, sexual orientation, or gender identity.            Thank you!     Thank you for choosing [facility]  for your care. Our goal is always to provide you with excellent care. Hearing back " from our patients is one way we can continue to improve our services. Please take a few minutes to complete the written survey that you may receive in the mail after your visit with us. Thank you!             Your Updated Medication List - Protect others around you: Learn how to safely use, store and throw away your medicines at www.disposemymeds.org.          This list is accurate as of 3/5/18 11:37 AM.  Always use your most recent med list.                   Brand Name Dispense Instructions for use Diagnosis    ACCU-CHEK CLARISA PLUS test strip   Generic drug:  blood glucose monitoring      TESTING TID UTD        aspirin 81 MG tablet      Take  by mouth daily.    Lichen planopilaris       blood glucose monitoring lancets      2 times daily        diclofenac sodium 3 % Gel           FELDENE 10 MG capsule   Generic drug:  piroxicam      Take 10 mg by mouth daily.    Lichen planopilaris       finasteride 1 MG tablet    PROPECIA    90 tablet    Take 1 tablet (1 mg) by mouth daily    Frontal fibrosing alopecia       FLONASE 50 MCG/ACT spray   Generic drug:  fluticasone      Spray 2 sprays into both nostrils 2 times daily.    Lichen planopilaris       latanoprost 0.005 % ophthalmic solution    XALATAN     1 drop At Bedtime        * losartan-hydrochlorothiazide 100-25 MG per tablet    HYZAAR     Take 1 tablet by mouth daily        * losartan-hydrochlorothiazide 50-12.5 MG per tablet    HYZAAR     Take 1 tablet by mouth daily        Minoxidil 5 % Foam    ROGAINE MENS    60 g    Use on affected areas of scalp / eyebrows nightly.    Lichen planopilaris       * priLOSEC 40 MG capsule   Generic drug:  omeprazole      Take  by mouth daily.    Lichen planopilaris       * omeprazole 20 MG CR capsule    priLOSEC     Take 20 mg by mouth daily        tacrolimus 0.1 % ointment    PROTOPIC    60 g    Use two times daily when flaring.    Frontal fibrosing alopecia, Lichen planopilaris       TRADJENTA 5 MG Tabs tablet   Generic drug:   linagliptin      Take 5 mg by mouth daily        triamcinolone 0.1 % cream    KENALOG    45 g    Apply to affected symptomatic areas every other day    Lichen planopilaris, Dermatitis       vitamin D 2000 UNITS tablet      Take 1 tablet by mouth daily.    Lichen planopilaris       * Notice:  This list has 4 medication(s) that are the same as other medications prescribed for you. Read the directions carefully, and ask your doctor or other care provider to review them with you.

## 2018-03-07 LAB
BACTERIA SPEC CULT: ABNORMAL
SPECIMEN SOURCE: ABNORMAL

## 2018-03-08 DIAGNOSIS — R30.0 DYSURIA: Primary | ICD-10-CM

## 2018-03-08 RX ORDER — CIPROFLOXACIN 500 MG/1
500 TABLET, FILM COATED ORAL 2 TIMES DAILY
Qty: 10 TABLET | Refills: 0 | Status: SHIPPED | OUTPATIENT
Start: 2018-03-08 | End: 2018-03-13

## 2018-03-09 ENCOUNTER — TELEPHONE (OUTPATIENT)
Dept: FAMILY MEDICINE | Facility: CLINIC | Age: 68
End: 2018-03-09

## 2018-03-09 NOTE — TELEPHONE ENCOUNTER
Pt calling back to speak with a nurse about lab results.  # [phone] okay to leave a detailed message.

## 2018-03-09 NOTE — TELEPHONE ENCOUNTER
Per Lab Results CC'd to Triage:       Hector Thorne MD  P  Endocrine                     Patient's recent urine test confirms (E.coli) urine infection.  I advise she start Cipro 500 mg po BID, #10, no refills ASAP.  I have sent the new Rx sent to her pharmacy.  Please relay message and plan.  TL                Associated Results        Urine Culture Aerobic Bacterial   Status:  Final result   Visible to patient:  Yes (MyChart) Dx:  Type 2 diabetes mellitus without comp... Order: [id number]       Notes Recorded by Hector Thorne MD on 3/8/2018 at 10:12 PM  No notes recorded by provider  ------    Notes Recorded by Hector Thorne MD on 3/8/2018 at 10:08 PM  Patient's recent urine test confirms (E.coli) urine infection.  I advise she start Cipro 500 mg po BID, #10, no refills ASAP.  I have sent the new Rx sent to her pharmacy.  Please relay message and plan.  TL     Called and left message for patient to call triage back at both pt's home and mobile numbers    Tiffanie HARKINS RN

## 2018-03-09 NOTE — TELEPHONE ENCOUNTER
Returned patient's call:    Per message below left a detailed message below with lab information and that an RX has been sent to her pharmacy and should be picked up today.     Advised to call back with any questions or concerns.     Joanne VILLANUEVA RN

## 2018-03-15 RX ORDER — PIOGLITAZONEHYDROCHLORIDE 15 MG/1
15 TABLET ORAL DAILY
Qty: 30 TABLET | Refills: 11 | Status: SHIPPED | OUTPATIENT
Start: 2018-03-15 | End: 2018-04-21

## 2018-03-15 RX ORDER — LIRAGLUTIDE 6 MG/ML
INJECTION SUBCUTANEOUS
Qty: 6 ML | Refills: 11 | Status: SHIPPED | OUTPATIENT
Start: 2018-03-15 | End: 2018-04-21

## 2018-03-16 NOTE — TELEPHONE ENCOUNTER
Message noted, called patient and discussed.  She is feeling much better since taking the Cipro Abx for the UTI.  We discussed her recent BG trends and DM med options.  I advised continuing the pioglitazone 15 mg daily and also start the Victoza 0.6 mg sq daily.  We reviewed the Victoza med use and dosing.  She agreed with this plan and I then sent the pioglitazone, Victoza, and pen needle Rx's to her  pharmacy.  She thanked me for the call and plan.    PATT Thorne MD  [facility]

## 2018-04-20 ENCOUNTER — MYC MEDICAL ADVICE (OUTPATIENT)
Dept: ENDOCRINOLOGY | Facility: CLINIC | Age: 68
End: 2018-04-20

## 2018-04-20 DIAGNOSIS — E11.9 TYPE 2 DIABETES MELLITUS WITHOUT COMPLICATION, WITHOUT LONG-TERM CURRENT USE OF INSULIN (H): ICD-10-CM

## 2018-04-21 RX ORDER — LIRAGLUTIDE 6 MG/ML
INJECTION SUBCUTANEOUS
Qty: 12 ML | Refills: 3 | Status: SHIPPED | OUTPATIENT
Start: 2018-04-21 | End: 2019-01-17

## 2018-04-21 RX ORDER — PIOGLITAZONEHYDROCHLORIDE 15 MG/1
15 TABLET ORAL DAILY
Qty: 90 TABLET | Refills: 3 | Status: SHIPPED | OUTPATIENT
Start: 2018-04-21 | End: 2019-01-17

## 2018-04-23 ENCOUNTER — OFFICE VISIT (OUTPATIENT)
Dept: DERMATOLOGY | Facility: CLINIC | Age: 68
End: 2018-04-23
Payer: COMMERCIAL

## 2018-04-23 DIAGNOSIS — Z85.828 HISTORY OF SKIN CANCER: ICD-10-CM

## 2018-04-23 DIAGNOSIS — L82.0 INFLAMED SEBORRHEIC KERATOSIS: ICD-10-CM

## 2018-04-23 DIAGNOSIS — L82.1 SEBORRHEIC KERATOSIS: ICD-10-CM

## 2018-04-23 DIAGNOSIS — Z12.83 SKIN CANCER SCREENING: Primary | ICD-10-CM

## 2018-04-23 ASSESSMENT — ACTIVITIES OF DAILY LIVING (ADL)
COGNITION: 0 - NO COGNITION ISSUES REPORTED
RETIRED_EATING: 0-->INDEPENDENT
CHANGE_IN_FUNCTIONAL_STATUS_SINCE_ONSET_OF_CURRENT_ILLNESS/INJURY: NO
EATING: 0 - INDEPENDENT
FALL_HISTORY_WITHIN_LAST_SIX_MONTHS: NO
BATHING: 0-->INDEPENDENT
AMBULATION: 0-->INDEPENDENT
TRANSFERRING: 0 - INDEPENDENT
TOILETING: 0 - INDEPENDENT
TRANSFERRING: 0-->INDEPENDENT
RETIRED_COMMUNICATION: 0-->UNDERSTANDS/COMMUNICATES WITHOUT DIFFICULTY
BATHING: 0 - INDEPENDENT
COMMUNICATION: 0 - UNDERSTANDS/COMMUNICATES WITHOUT DIFFICULTY
SWALLOWING: 0-->SWALLOWS FOODS/LIQUIDS WITHOUT DIFFICULTY
DRESS: 0 - INDEPENDENT
TOILETING: 0-->INDEPENDENT
AMBULATION: 0 - INDEPENDENT
SWALLOWING: 0 - SWALLOWS FOODS/LIQUIDS WITHOUT DIFFICULTY
DRESS: 0-->INDEPENDENT

## 2018-04-23 ASSESSMENT — PAIN SCALES - GENERAL: PAINLEVEL: NO PAIN (0)

## 2018-04-23 NOTE — NURSING NOTE
Dermatology Rooming Note    Aleja Gudino's goals for this visit include:   Chief Complaint   Patient presents with     Derm Problem     Aleja is here for a full skin check. She states that there is a concern on her right shoulder as well as an area on her face.      Juli Garcia LPN

## 2018-04-23 NOTE — MR AVS SNAPSHOT
After Visit Summary   4/23/2018    Aleja Gudino    MRN: [id number]           Patient Information     Date Of Birth          1950        Visit Information        Provider Department      4/23/2018 11:00 AM Ita Dillard PA-C [facility]        Today's Diagnoses     Skin cancer screening    -  1    History of skin cancer          Care Instructions    Cryotherapy    What is it?    Use of a very cold liquid, such as liquid nitrogen, to freeze and destroy abnormal skin cells that need to be removed    What should I expect?    Tenderness and redness    A small blister that might grow and fill with dark purple blood. There may be crusting.    More than one treatment may be needed if the lesions do not go away.    How do I care for the treated area?    Gently wash the area with your hands when bathing.    Use a thin layer of Vaseline to help with healing. You may use a Band-Aid.     The area should heal within 7-10 days and may leave behind a pink or lighter color.     Do not use an antibiotic or Neosporin ointment.     You may take acetaminophen (Tylenol) for pain.     Call your Doctor if you have:    Severe pain    Signs of infection (warmth, redness, cloudy yellow drainage, and or a bad smell)    Questions or concerns    Who should I call with questions?       [facility]: [phone]       [facility]: [phone]       For urgent needs outside of business hours call the [facility] at [phone]        and ask for the dermatology resident on call            Follow-ups after your visit        Your next 10 appointments already scheduled     Jun 04, 2018 12:50 PM CDT   (Arrive by 12:35 PM)   RETURN HAIRLOSS with Zoie Bush MD   [facility] ([facility])    [address] [address] [phone]              Who to contact      Please call your clinic at [phone] to:    Ask questions about your health    Make or cancel appointments    Discuss your medicines    Learn about your test results    Speak to your doctor            Additional Information About Your Visit        Ze[employer]genhart Information     [employer] gives you secure access to your electronic health record. If you see a primary care provider, you can also send messages to your care team and make appointments. If you have questions, please call your primary care clinic.  If you do not have a primary care provider, please call [phone] and they will assist you.      [employer] is an electronic gateway that provides easy, online access to your medical records. With [employer], you can request a clinic appointment, read your test results, renew a prescription or communicate with your care team.     To access your existing account, please contact your [facility] or call [phone] for assistance.        Care EveryWhere ID     This is your Care EveryWhere ID. This could be used by other organizations to access your [place] medical records  [id number]         Blood Pressure from Last 3 Encounters:   03/05/18 142/82   02/05/18 164/84   05/30/17 135/77    Weight from Last 3 Encounters:   03/05/18 78 kg (172 lb)   12/21/15 70.3 kg (155 lb)   06/15/15 68 kg (150 lb)              Today, you had the following     No orders found for display       Primary Care Provider Office Phone # Fax #    Tanmay Theodore Arriola [phone]       [address] [address]        Equal Access to Services     [facility]: Hadii aad ku hadasho Soomaali, waaxda luqadaha, qaybta kaalmada adeegyada, waxsteve pena . So [facility] [phone].    ATENCIÓN: Si habla español, tiene a bolanos disposición servicios gratuitos de asistencia lingüística. Llame al [phone].    We comply with applicable federal  civil rights laws and [place] laws. We do not discriminate on the basis of race, color, national origin, age, disability, sex, sexual orientation, or gender identity.            Thank you!     Thank you for choosing [facility] DERMATOLOG[facility]  for your care. Our goal is always to provide you with excellent care. Hearing back from our patients is one way we can continue to improve our services. Please take a few minutes to complete the written survey that you may receive in the mail after your visit with us. Thank you!             Your Updated Medication List - Protect others around you: Learn how to safely use, store and throw away your medicines at www.disposemymeds.org.          This list is accurate as of 4/23/18 11:05 AM.  Always use your most recent med list.                   Brand Name Dispense Instructions for use Diagnosis    ACCU-CHEK CLARISA PLUS test strip   Generic drug:  blood glucose monitoring      TESTING TID UTD        aspirin 81 MG tablet      Take  by mouth daily.    Lichen planopilaris       blood glucose monitoring lancets      2 times daily        diclofenac sodium 3 % Gel           FELDENE 10 MG capsule   Generic drug:  piroxicam      Take 10 mg by mouth daily.    Lichen planopilaris       finasteride 1 MG tablet    PROPECIA    90 tablet    Take 1 tablet (1 mg) by mouth daily    Frontal fibrosing alopecia       FLONASE 50 MCG/ACT spray   Generic drug:  fluticasone      Spray 2 sprays into both nostrils 2 times daily.    Lichen planopilaris       insulin pen needle 32G X 4 MM    BD YURIY U/F    50 each    Use 1 pen needles daily or as directed.    Type 2 diabetes mellitus without complication, without long-term current use of insulin (H)       latanoprost 0.005 % ophthalmic solution    XALATAN     1 drop At Bedtime        liraglutide 18 MG/3ML soln    VICTOZA PEN    12 mL    Take 0.6 to 1.2 mg sq daily, as directed    Type 2 diabetes mellitus without complication, without long-term current use of  insulin (H)       * losartan-hydrochlorothiazide 100-25 MG per tablet    HYZAAR     Take 1 tablet by mouth daily        * losartan-hydrochlorothiazide 50-12.5 MG per tablet    HYZAAR     Take 1 tablet by mouth daily        Minoxidil 5 % Foam    ROGAINE MENS    60 g    Use on affected areas of scalp / eyebrows nightly.    Lichen planopilaris       pioglitazone 15 MG tablet    ACTOS    90 tablet    Take 1 tablet (15 mg) by mouth daily    Type 2 diabetes mellitus without complication, without long-term current use of insulin (H)       * priLOSEC 40 MG capsule   Generic drug:  omeprazole      Take  by mouth daily.    Lichen planopilaris       * omeprazole 20 MG CR capsule    priLOSEC     Take 20 mg by mouth daily        tacrolimus 0.1 % ointment    PROTOPIC    60 g    Use two times daily when flaring.    Frontal fibrosing alopecia, Lichen planopilaris       triamcinolone 0.1 % cream    KENALOG    45 g    Apply to affected symptomatic areas every other day    Lichen planopilaris, Dermatitis       vitamin D 2000 units tablet      Take 1 tablet by mouth daily.    Lichen planopilaris       * Notice:  This list has 4 medication(s) that are the same as other medications prescribed for you. Read the directions carefully, and ask your doctor or other care provider to review them with you.

## 2018-04-23 NOTE — PATIENT INSTRUCTIONS
Cryotherapy    What is it?    Use of a very cold liquid, such as liquid nitrogen, to freeze and destroy abnormal skin cells that need to be removed    What should I expect?    Tenderness and redness    A small blister that might grow and fill with dark purple blood. There may be crusting.    More than one treatment may be needed if the lesions do not go away.    How do I care for the treated area?    Gently wash the area with your hands when bathing.    Use a thin layer of Vaseline to help with healing. You may use a Band-Aid.     The area should heal within 7-10 days and may leave behind a pink or lighter color.     Do not use an antibiotic or Neosporin ointment.     You may take acetaminophen (Tylenol) for pain.     Call your Doctor if you have:    Severe pain    Signs of infection (warmth, redness, cloudy yellow drainage, and or a bad smell)    Questions or concerns    Who should I call with questions?       [facility]: [phone]       [facility]: [phone]       For urgent needs outside of business hours call the [facility] at [phone]        and ask for the dermatology resident on call

## 2018-04-23 NOTE — PROGRESS NOTES
[facility] Dermatology Note    Dermatology Problem List:  1. Hx of SCC, left thigh s/p removed in ~2000s  2. Hx of sebaceous hyperplasia, left cheek s/p 6/18/13  3. Frontal fibrosing alopecia  - s/p ketoconazole 2% shampoo once weekly (stopped 2/2 bumps/itching)  - Rogaine 5% foam, finasteride 1 mg QDay - reports it may be working, she would like to continue, low level laser comb, protopic ointment  - on Actos (pioglitazone) for almost a year for diabetes, type II, may also be benefiting the FFA    4. History of AKs  5. ISK s/p cryo  6. Skin cancer screening 4/23/18    Encounter Date: Apr 23, 2018    CC:   Chief Complaint   Patient presents with     Derm Problem     Aleja is here for a full skin check. She states that there is a concern on her right shoulder as well as an area on her face.      History of Present Illness:  Ms. Aleja Gudino is a 67 year old female who presents for a skin check as a referral by Dr. Arriola. The patient was last seen on 2/5/18 by Dr. Bush when she resumed low level laser comb, continued finasteride and minoxidil, and also started protopic ointment for frontal fibrosing alopecia. She also had an actinic keratosis on her right temple treated with cryotherapy. Today the patient reports there is a lesion of concern on her right shoulder, she has not noticed any changes in size. She also has a lesion on her face that is pruritic. She also notes that her hair is very stable right now and has not noticed much hair loss. She has a follow up for her hair loss in June. The patient reports no other lesions of concern at this time.     Otherwise, the patient reports no painful, bleeding, nonhealing, or pruritic lesions, and denies new or changing moles.    Past Medical History:   Patient Active Problem List   Diagnosis     Cicatricial alopecia     Actinic keratosis     Dermatitis     Frontal fibrosing alopecia     Dermatitis, seborrheic     Elevated blood pressure  reading     Allergic rhinitis     Breast microcalcifications     Carpal tunnel syndrome     Esophageal reflux     History of bowel resection     HTN (hypertension)     Ingrowing nail     Irritable bowel syndrome     Low back pain radiating to left lower extremity     Obstructive sleep apnea     Osteoarthritis     Other atopic dermatitis and related conditions     Overweight     Pain in joint, shoulder region     Postmenopausal atrophic vaginitis     Primary open angle glaucoma     Encounter for screening for osteoporosis     Thyroid nodule     Type 2 diabetes mellitus without complication, without long-term current use of insulin (H)     Asthma     Essential hypertension     Vitamin D deficiency     Past Medical History:   Diagnosis Date     Diabetes (H)      Frontal fibrosing alopecia      Glaucoma      Hair loss      Hypertension      Sleep apnea     c-pap     Thyroid nodule      Weight loss      Past Surgical History:   Procedure Laterality Date     BIOPSY OF SKIN LESION       CARPAL TUNNEL RELEASE RT/LT        SECTION       CHOLECYSTECTOMY       COLONOSCOPY N/A 3/19/2015    Procedure: COLONOSCOPY;  Surgeon: Ck Childress MD;  Location: [facility]     Excision oral melanotic macule  2013    lower right lip     HC LAP,FULGURATE/EXCISE LESIONS      tumors removed and colon resection     KNEE SURGERY      arthroscopic- left knee     NO HISTORY OF SURGERY  13    derm     ORTHOPEDIC SURGERY      right and left knee replacements     RELEASE TRIGGER FINGER       RESECTION ABDOMINAL PERINEAL       Social History:  She lost 85lbs in  and has kept it off.    Family History:  Not addressed at this visit.    Medications:  Current Outpatient Prescriptions   Medication Sig Dispense Refill     ACCU-CHEK CLARISA PLUS test strip TESTING TID UTD  1     aspirin 81 MG tablet Take  by mouth daily.       blood glucose monitoring (ACCU-CHEK FASTCLIX) lancets 2 times daily  2     Cholecalciferol (VITAMIN D)   UNITS tablet Take 1 tablet by mouth daily.       diclofenac sodium 3 % GEL        finasteride (PROPECIA) 1 MG tablet Take 1 tablet (1 mg) by mouth daily 90 tablet 1     fluticasone (FLONASE) 50 MCG/ACT nasal spray Spray 2 sprays into both nostrils 2 times daily.       insulin pen needle (BD YURIY U/F) 32G X 4 MM Use 1 pen needles daily or as directed. 50 each 11     latanoprost (XALATAN) 0.005 % ophthalmic solution 1 drop At Bedtime       liraglutide (VICTOZA PEN) 18 MG/3ML soln Take 0.6 to 1.2 mg sq daily, as directed 12 mL 3     losartan-hydrochlorothiazide (HYZAAR) 100-25 MG per tablet Take 1 tablet by mouth daily       losartan-hydrochlorothiazide (HYZAAR) 50-12.5 MG per tablet Take 1 tablet by mouth daily  3     Minoxidil (ROGAINE MENS) 5 % FOAM Use on affected areas of scalp / eyebrows nightly. 60 g 11     omeprazole (PRILOSEC) 20 MG CR capsule Take 20 mg by mouth daily  3     omeprazole (PRILOSEC) 40 MG capsule Take  by mouth daily.       pioglitazone (ACTOS) 15 MG tablet Take 1 tablet (15 mg) by mouth daily 90 tablet 3     piroxicam (FELDENE) 10 MG capsule Take 10 mg by mouth daily.       tacrolimus (PROTOPIC) 0.1 % ointment Use two times daily when flaring. 60 g 0     triamcinolone (KENALOG) 0.1 % cream Apply to affected symptomatic areas every other day (Patient not taking: Reported on 2/5/2018) 45 g 1     Allergies   Allergen Reactions     Metformin Diarrhea     Review of Systems:  - As per HPI  - Constitutional: The patient denies fatigue, fevers, chills, or recent illnesses  - Skin: As above in HPI. No additional skin concerns.    Physical exam:  Vitals: There were no vitals taken for this visit.  GEN: This is a well developed, well-nourished female in no acute distress, in a pleasant mood.    SKIN: Full skin, which includes the head/face, both arms, chest, back, abdomen,both legs, genitalia and/or groin buttocks, digits and/or nails, was examined.  - No active lesion noted on the left thigh  - Recession  of frontal hair line  - Stuck on tan to brown papule on a base of erythema on the right upper forehead  - Stuck on tan to brown papules on the trunk, extremities  - Regular brown pigmented macules and papules are identified on the trunk, extremities.   - No other lesions of concern on areas examined.     Impression/Plan:  1. Hx of SCC, left thigh  - No evidence of recurrence. Continue regular skin checks.     2. Frontal fibrosing alopecia  - Continue low level laser comb  - Continue finasteride and minoxidil  - Continue protopic ointment PRN for red papules    3. Inflamed seborrheic keratosis - right upper forehead  - Cryotherapy procedure note: After verbal consent and discussion of risks and benefits including but no limited to dyspigmentation/scar, blister, and pain, 1 was(were) treated with 1-2mm freeze border for 2 cycles with liquid nitrogen. Post cryotherapy instructions were provided.     4. Seborrheic keratoses - trunk, extremities, including the right upper back.   - No further intervention required. Patient to report changes.     5. Clinically benign nevi - trunk, extremities  - No further intervention required. Patient to report changes.   - Sun precaution was advised including the use of sun screens of SPF 30 or higher, sun protective clothing, and avoidance of tanning beds.    Follow up in June, earlier for new or changing lesions.    Staff Involved:  Scribe Disclosure:   I, Radha Jules, am serving as a scribe to document services personally performed by Ita Dillard PA-C, based on data collection and the provider's statements to me.    Provider Disclosure:   The documentation recorded by the scribe accurately reflects the services I personally performed and the decisions made by me.    All risks, benefits and alternatives were discussed with patient.  Patient is in agreement and understands the assessment and plan.  All questions were answered.  Sun Screen Education was given.   Return to Clinic  and every 1-2 yrs for a skin check  or sooner as needed.   Ita Dillard PA-C   [facility]

## 2018-04-23 NOTE — LETTER
4/23/2018       RE: Aleja Gudino  [address]  [place] [address]     Dear Colleague,    Thank you for referring your patient, Aleja Gudino, to the [facility] DERMATOLOGY at [facility]. Please see a copy of my visit note below.    [facility] Dermatology Note    Dermatology Problem List:  1. Hx of SCC, left thigh s/p removed in ~2000s  2. Hx of sebaceous hyperplasia, left cheek s/p 6/18/13  3. Frontal fibrosing alopecia  - s/p ketoconazole 2% shampoo once weekly (stopped 2/2 bumps/itching)  - Rogaine 5% foam, finasteride 1 mg QDay - reports it may be working, she would like to continue, low level laser comb, protopic ointment  - on Actos (pioglitazone) for almost a year for diabetes, type II, may also be benefiting the FFA    4. History of AKs  5. ISK s/p cryo  6. Skin cancer screening 4/23/18    Encounter Date: Apr 23, 2018    CC:   Chief Complaint   Patient presents with     Derm Problem     Aleja is here for a full skin check. She states that there is a concern on her right shoulder as well as an area on her face.      History of Present Illness:  Ms. Aleja Gudino is a 67 year old female who presents for a skin check as a referral by Dr. Arriola. The patient was last seen on 2/5/18 by Dr. Bush when she resumed low level laser comb, continued finasteride and minoxidil, and also started protopic ointment for frontal fibrosing alopecia. She also had an actinic keratosis on her right temple treated with cryotherapy. Today the patient reports there is a lesion of concern on her right shoulder, she has not noticed any changes in size. She also has a lesion on her face that is pruritic. She also notes that her hair is very stable right now and has not noticed much hair loss. She has a follow up for her hair loss in June. The patient reports no other lesions of concern at this time.     Otherwise, the patient reports no painful, bleeding, nonhealing,  or pruritic lesions, and denies new or changing moles.    Past Medical History:   Patient Active Problem List   Diagnosis     Cicatricial alopecia     Actinic keratosis     Dermatitis     Frontal fibrosing alopecia     Dermatitis, seborrheic     Elevated blood pressure reading     Allergic rhinitis     Breast microcalcifications     Carpal tunnel syndrome     Esophageal reflux     History of bowel resection     HTN (hypertension)     Ingrowing nail     Irritable bowel syndrome     Low back pain radiating to left lower extremity     Obstructive sleep apnea     Osteoarthritis     Other atopic dermatitis and related conditions     Overweight     Pain in joint, shoulder region     Postmenopausal atrophic vaginitis     Primary open angle glaucoma     Encounter for screening for osteoporosis     Thyroid nodule     Type 2 diabetes mellitus without complication, without long-term current use of insulin (H)     Asthma     Essential hypertension     Vitamin D deficiency     Past Medical History:   Diagnosis Date     Diabetes (H)      Frontal fibrosing alopecia      Glaucoma      Hair loss      Hypertension      Sleep apnea     c-pap     Thyroid nodule      Weight loss      Past Surgical History:   Procedure Laterality Date     BIOPSY OF SKIN LESION       CARPAL TUNNEL RELEASE RT/LT        SECTION       CHOLECYSTECTOMY       COLONOSCOPY N/A 3/19/2015    Procedure: COLONOSCOPY;  Surgeon: Ck Childress MD;  Location: [facility]     Excision oral melanotic macule  2013    lower right lip     HC LAP,FULGURATE/EXCISE LESIONS      tumors removed and colon resection     KNEE SURGERY      arthroscopic- left knee     NO HISTORY OF SURGERY  13    derm     ORTHOPEDIC SURGERY      right and left knee replacements     RELEASE TRIGGER FINGER       RESECTION ABDOMINAL PERINEAL       Social History:  She lost 85lbs in  and has kept it off.    Family History:  Not addressed at this visit.    Medications:  Current  Outpatient Prescriptions   Medication Sig Dispense Refill     ACCU-CHEK CLARISA PLUS test strip TESTING TID UTD  1     aspirin 81 MG tablet Take  by mouth daily.       blood glucose monitoring (ACCU-CHEK FASTCLIX) lancets 2 times daily  2     Cholecalciferol (VITAMIN D) 2000 UNITS tablet Take 1 tablet by mouth daily.       diclofenac sodium 3 % GEL        finasteride (PROPECIA) 1 MG tablet Take 1 tablet (1 mg) by mouth daily 90 tablet 1     fluticasone (FLONASE) 50 MCG/ACT nasal spray Spray 2 sprays into both nostrils 2 times daily.       insulin pen needle (BD YURIY U/F) 32G X 4 MM Use 1 pen needles daily or as directed. 50 each 11     latanoprost (XALATAN) 0.005 % ophthalmic solution 1 drop At Bedtime       liraglutide (VICTOZA PEN) 18 MG/3ML soln Take 0.6 to 1.2 mg sq daily, as directed 12 mL 3     losartan-hydrochlorothiazide (HYZAAR) 100-25 MG per tablet Take 1 tablet by mouth daily       losartan-hydrochlorothiazide (HYZAAR) 50-12.5 MG per tablet Take 1 tablet by mouth daily  3     Minoxidil (ROGAINE MENS) 5 % FOAM Use on affected areas of scalp / eyebrows nightly. 60 g 11     omeprazole (PRILOSEC) 20 MG CR capsule Take 20 mg by mouth daily  3     omeprazole (PRILOSEC) 40 MG capsule Take  by mouth daily.       pioglitazone (ACTOS) 15 MG tablet Take 1 tablet (15 mg) by mouth daily 90 tablet 3     piroxicam (FELDENE) 10 MG capsule Take 10 mg by mouth daily.       tacrolimus (PROTOPIC) 0.1 % ointment Use two times daily when flaring. 60 g 0     triamcinolone (KENALOG) 0.1 % cream Apply to affected symptomatic areas every other day (Patient not taking: Reported on 2/5/2018) 45 g 1     Allergies   Allergen Reactions     Metformin Diarrhea     Review of Systems:  - As per HPI  - Constitutional: The patient denies fatigue, fevers, chills, or recent illnesses  - Skin: As above in HPI. No additional skin concerns.    Physical exam:  Vitals: There were no vitals taken for this visit.  GEN: This is a well developed,  well-nourished female in no acute distress, in a pleasant mood.    SKIN: Full skin, which includes the head/face, both arms, chest, back, abdomen,both legs, genitalia and/or groin buttocks, digits and/or nails, was examined.  - No active lesion noted on the left thigh  - Recession of frontal hair line  - Stuck on tan to brown papule on a base of erythema on the right upper forehead  - Stuck on tan to brown papules on the trunk, extremities  - Regular brown pigmented macules and papules are identified on the trunk, extremities.   - No other lesions of concern on areas examined.     Impression/Plan:  1. Hx of SCC, left thigh  - No evidence of recurrence. Continue regular skin checks.     2. Frontal fibrosing alopecia  - Continue low level laser comb  - Continue finasteride and minoxidil  - Continue protopic ointment PRN for red papules    3. Inflamed seborrheic keratosis - right upper forehead  - Cryotherapy procedure note: After verbal consent and discussion of risks and benefits including but no limited to dyspigmentation/scar, blister, and pain, 1 was(were) treated with 1-2mm freeze border for 2 cycles with liquid nitrogen. Post cryotherapy instructions were provided.     4. Seborrheic keratoses - trunk, extremities, including the right upper back.   - No further intervention required. Patient to report changes.     5. Clinically benign nevi - trunk, extremities  - No further intervention required. Patient to report changes.   - Sun precaution was advised including the use of sun screens of SPF 30 or higher, sun protective clothing, and avoidance of tanning beds.    Follow up in June, earlier for new or changing lesions.    Staff Involved:  Scribe Disclosure:   Radha CHRISTINE, am serving as a scribe to document services personally performed by Ita Dillard PA-C, based on data collection and the provider's statements to me.    Provider Disclosure:   The documentation recorded by the scribe accurately reflects  the services I personally performed and the decisions made by me.    All risks, benefits and alternatives were discussed with patient.  Patient is in agreement and understands the assessment and plan.  All questions were answered.  Sun Screen Education was given.   Return to Clinic and every 1-2 yrs for a skin check  or sooner as needed.   Ita Dillard PA-C   [facility]

## 2018-08-29 DIAGNOSIS — L66.12 FRONTAL FIBROSING ALOPECIA: ICD-10-CM

## 2018-08-30 RX ORDER — FINASTERIDE 1 MG/1
TABLET, FILM COATED ORAL
Qty: 60 TABLET | Refills: 0 | Status: SHIPPED | OUTPATIENT
Start: 2018-08-30 | End: 2018-10-28

## 2018-09-11 ENCOUNTER — OFFICE VISIT (OUTPATIENT)
Dept: ENDOCRINOLOGY | Facility: CLINIC | Age: 68
End: 2018-09-11
Payer: COMMERCIAL

## 2018-09-11 VITALS
SYSTOLIC BLOOD PRESSURE: 140 MMHG | HEART RATE: 71 BPM | DIASTOLIC BLOOD PRESSURE: 68 MMHG | HEIGHT: 63 IN | BODY MASS INDEX: 29.95 KG/M2 | WEIGHT: 169 LBS

## 2018-09-11 DIAGNOSIS — Z86.39 HISTORY OF THYROID NODULE: ICD-10-CM

## 2018-09-11 DIAGNOSIS — E11.9 TYPE 2 DIABETES MELLITUS WITHOUT COMPLICATION, WITHOUT LONG-TERM CURRENT USE OF INSULIN (H): Primary | ICD-10-CM

## 2018-09-11 LAB
ANION GAP SERPL CALCULATED.3IONS-SCNC: 9 MMOL/L (ref 3–14)
BUN SERPL-MCNC: 16 MG/DL (ref 7–30)
CALCIUM SERPL-MCNC: 8.4 MG/DL (ref 8.5–10.1)
CHLORIDE SERPL-SCNC: 104 MMOL/L (ref 94–109)
CO2 SERPL-SCNC: 26 MMOL/L (ref 20–32)
CREAT SERPL-MCNC: 0.76 MG/DL (ref 0.52–1.04)
GFR SERPL CREATININE-BSD FRML MDRD: 75 ML/MIN/1.7M2
GLUCOSE SERPL-MCNC: 91 MG/DL (ref 70–99)
HBA1C MFR BLD: 6.2 % (ref 0–5.6)
POTASSIUM SERPL-SCNC: 3.9 MMOL/L (ref 3.4–5.3)
SODIUM SERPL-SCNC: 139 MMOL/L (ref 133–144)
TSH SERPL DL<=0.005 MIU/L-ACNC: 1.22 MU/L (ref 0.4–4)

## 2018-09-11 PROCEDURE — 80048 BASIC METABOLIC PNL TOTAL CA: CPT | Performed by: INTERNAL MEDICINE

## 2018-09-11 PROCEDURE — 83036 HEMOGLOBIN GLYCOSYLATED A1C: CPT | Performed by: INTERNAL MEDICINE

## 2018-09-11 PROCEDURE — 84443 ASSAY THYROID STIM HORMONE: CPT | Performed by: INTERNAL MEDICINE

## 2018-09-11 PROCEDURE — 99214 OFFICE O/P EST MOD 30 MIN: CPT | Performed by: INTERNAL MEDICINE

## 2018-09-11 PROCEDURE — 36415 COLL VENOUS BLD VENIPUNCTURE: CPT | Performed by: INTERNAL MEDICINE

## 2018-09-11 NOTE — PROGRESS NOTES
Name: Aleja Gudino is a 68 year old woman, self-referred for evaluation of diabetes mellitus  Previously seen by me at my former clinic ([facility]), here to establish care with [facility].    HPI:  Recent issues:  Here for diabetes evaluation          . Diagnosis of diabetes mellitus  ...Details of initial evaluation not available, body wt near 215#  Initial treatment with diet management  . Began metformin medication, but GI intolerance  Subsequent use of glimeparide, Tradjenta, basal insulin, Victoza, pioglitazone  . Began weight loss plan  Current DM meds:   Victoza 0.6 mg daily   Pioglitazone 15 mg daily  Using Accucheck BG meter, tests 1-2x/day   Recent trends 106-180 mg/dl, meter avg 137 mg/dl  Previous [facility] labs include:  Lab Results   Component Value Date    A1C 7.6 (H) 2018     2018    POTASSIUM 4.3 2018    CHLORIDE 101 2018    CO2 28 2018    ANIONGAP 7 2018     (H) 2018    BUN 26 2018    CR 0.64 2018    GFRESTIMATED >90 2018    GFRESTBLACK >90 2018    HUANG 9.4 2018    TSH 1.28 2016     Recent [facility] labs include:      Fam Hx diabetes: none  DM Complications: none known  Last eye exam with Dr. Matute 2018, no DR      Sees Dr. Tanmay Arriola/[facility] for general medicine evaluations.  Also sees Dr. Bush[facility]    PMH/PSH:  Past Medical History:   Diagnosis Date     Frontal fibrosing alopecia      Glaucoma      Hair loss      Hypertension      Sleep apnea     c-pap     Squamous cell carcinoma      Thyroid nodule      Type 2 diabetes mellitus (H)      Weight loss      Past Surgical History:   Procedure Laterality Date     BIOPSY OF SKIN LESION       CARPAL TUNNEL RELEASE RT/LT        SECTION       CHOLECYSTECTOMY       COLONOSCOPY N/A 3/19/2015    Procedure: COLONOSCOPY;  Surgeon: Ck Childress MD;   Location:  GI     Excision oral melanotic macule  7/5/2013    lower right lip     HC LAP,FULGURATE/EXCISE LESIONS      tumors removed and colon resection     KNEE SURGERY      arthroscopic- left knee     NO HISTORY OF SURGERY  6/18/13    derm     ORTHOPEDIC SURGERY      right and left knee replacements     RELEASE TRIGGER FINGER       RESECTION ABDOMINAL PERINEAL         Family Hx:  Family History   Problem Relation Age of Onset     Cancer No family hx of      No family history of skin cancer     Melanoma No family hx of      Skin Cancer No family hx of          Social Hx:  Social History     Social History     Marital status:      Spouse name: N/A     Number of children: N/A     Years of education: N/A     Occupational History     Not on file.     Social History Main Topics     Smoking status: Never Smoker     Smokeless tobacco: Never Used     Alcohol use No     Drug use: No     Sexual activity: Not on file     Other Topics Concern     Not on file     Social History Narrative          MEDICATIONS:  has a current medication list which includes the following prescription(s): accu-chek pedro pablo plus, aspirin, blood glucose monitoring, vitamin d, diclofenac sodium, finasteride, fluticasone, insulin pen needle, latanoprost, liraglutide, losartan-hydrochlorothiazide, minoxidil, omeprazole, pioglitazone, piroxicam, tacrolimus, and triamcinolone.    ROS:     ROS: 10 point ROS neg other than the symptoms noted above in the HPI.    GENERAL: mild fatigue, wt stable; denies fevers, chills, night sweats.   HEENT: no dysphagia, odonophagia, diplopia, neck pain  THYROID:  no apparent hyper or hypothyroid symptoms  CV: no chest pain, pressure, palpitations  LUNGS: no SOB, SIDHU, cough, wheezing   ABDOMEN: no diarrhea, constipation, abdominal pain  EXTREMITIES: no rashes, ulcers, edema  NEUROLOGY: positional dizziness; no headaches, denies changes in vision, tingling, extremitiy numbness   MSK: no muscle aches or pains,  "weakness  SKIN: scalp hair thinning; no rashes or lesions  :  No menses  PSYCH:  stable mood, no significant anxiety or depression  ENDOCRINE: no heat or cold intolerance    Physical Exam   VS: /68 (BP Location: Right arm, Cuff Size: Adult Large)  Pulse 71  Ht 1.6 m (5' 3\")  Wt 76.7 kg (169 lb)  Breastfeeding? No  BMI 29.94 kg/m2  GENERAL: AXOX3, NAD, well dressed, answering questions appropriately, appears stated age.  THYROID:  normal gland, no apparent nodules or goiter  HEENT: neck non-tender, no exopthalmous, no proptosis, EOMI  CV: RRR, no rubs, gallops, no murmurs  LUNGS: CTAB, no wheezes, rales, or ronchi  ABDOMEN: mildly obese, soft, nontender, nondistended  EXTREMITIES: no edema, +pedal pulses, no lesions  NEUROLOGY: CN grossly intact, no tremors  MSK: grossly intact  SKIN: scalp hair thinning anteriorly; no rashes, no lesions    LABS:    All pertinent notes, labs, and images personally reviewed by me.     A/P:  Encounter Diagnoses   Name Primary?     Type 2 diabetes mellitus without complication, without long-term current use of insulin (H) Yes     History of thyroid nodule        Comments:  Reviewed health history and diabetes issues.    Plan:  Discussed general issues with the diabetes diagnosis and management  Reviewed the pathophysiology of T2DM  We discussed the hgbA1c test which reflects previous overall glucose levels or control  Discussed the importance of blood glucose (BG) testing to assess glucose trends  Provided general overview of the diabetes medication options and medication treatment plan.    Recommend:  Continue current low dose Victoza and pioglitazone medication plan  Check FBG daily or every other day, goal target  mg/dl  Monitor for symptom changes  Keep focus on diet, exercise, weight management.  Encouraged her to contact me if questions about endocrinologist access with a new insurance plan in 2019  Advise having fasting lipid panel testing and dilated eye " examination, at least annually    Addressed patient questions today    Labs ordered today:   Orders Placed This Encounter   Procedures     Hemoglobin A1c     Basic metabolic panel     TSH     Radiology/Consults ordered today: None    More than 50% of the time spent with Ms. Gudino on counseling / coordinating her care.  Total appointment time was 30 minutes.    Follow-up:  3 mo    Hector Thorne MD  Endocrinology  [place] [facility]

## 2018-09-11 NOTE — MR AVS SNAPSHOT
After Visit Summary   9/11/2018    Aleja Gudino    MRN: [id number]           Patient Information     Date Of Birth          1950        Visit Information        Provider Department      9/11/2018 11:00 AM Hector Thorne MD [facility]        Today's Diagnoses     Type 2 diabetes mellitus without complication, without long-term current use of insulin (H)    -  1    History of thyroid nodule           Follow-ups after your visit        Follow-up notes from your care team     Return in about 3 months (around 12/11/2018).      Your next 10 appointments already scheduled     Oct 08, 2018  8:00 AM CDT   (Arrive by 7:45 AM)   RETURN HAIRLOSS with Zoie Bush MD   [facility] ([facility])    [address] [place] [address] [phone]              Who to contact     If you have questions or need follow up information about today's clinic visit or your schedule please contact [facility] directly at [phone].  Normal or non-critical lab and imaging results will be communicated to you by MyChart, letter or phone within 4 business days after the clinic has received the results. If you do not hear from us within 7 days, please contact the clinic through [employer] or phone. If you have a critical or abnormal lab result, we will notify you by phone as soon as possible.  Submit refill requests through NPTV or call your pharmacy and they will forward the refill request to us. Please allow 3 business days for your refill to be completed.          Additional Information About Your Visit        MyChart Information     [employer] gives you secure access to your electronic health record. If you see a primary care provider, you can also send messages to your care team and make appointments. If you have questions, please call your primary care clinic.  If you do not have a primary care provider, please call  "[phone] and they will assist you.        Care EveryWhere ID     This is your Care EveryWhere ID. This could be used by other organizations to access your [facility] medical records  [id number]        Your Vitals Were     Pulse Height Breastfeeding? BMI (Body Mass Index)          71 1.6 m (5' 3\") No 29.94 kg/m2         Blood Pressure from Last 3 Encounters:   09/11/18 140/68   03/05/18 142/82   02/05/18 164/84    Weight from Last 3 Encounters:   09/11/18 76.7 kg (169 lb)   03/05/18 78 kg (172 lb)   12/21/15 70.3 kg (155 lb)              We Performed the Following     Basic metabolic panel     Hemoglobin A1c     TSH        Primary Care Provider Office Phone # Fax #    Tanmay Arriola [phone]       [place] [address]        Equal Access to Services     EMERITA WOLF : Hadii aad ku hadasho Soomaali, waaxda luqadaha, qaybta kaalmada adeegyada, waxay andreinain hayjosen sana pena . So [facility] [phone].    ATENCIÓN: Si bentley linares, tiene a bolanos disposición servicios gratuitos de asistencia lingüística. Llame al [phone].    We comply with applicable federal civil rights laws and [place] laws. We do not discriminate on the basis of race, color, national origin, age, disability, sex, sexual orientation, or gender identity.            Thank you!     Thank you for choosing [facility]  for your care. Our goal is always to provide you with excellent care. Hearing back from our patients is one way we can continue to improve our services. Please take a few minutes to complete the written survey that you may receive in the mail after your visit with us. Thank you!             Your Updated Medication List - Protect others around you: Learn how to safely use, store and throw away your medicines at www.disposemymeds.org.          This list is accurate as of 9/11/18 11:37 AM.  Always use your most recent med list.                "    Brand Name Dispense Instructions for use Diagnosis    ACCU-CHEK CLARISA PLUS test strip   Generic drug:  blood glucose monitoring      TESTING TID UTD        aspirin 81 MG tablet      Take  by mouth daily.    Lichen planopilaris       blood glucose monitoring lancets      2 times daily        diclofenac sodium 3 % Gel      2 %        FELDENE 10 MG capsule   Generic drug:  piroxicam      Take 10 mg by mouth daily.    Lichen planopilaris       finasteride 1 MG tablet    PROPECIA    60 tablet    TAKE 1 TABLET (1 MG) BY MOUTH DAILY    Frontal fibrosing alopecia       FLONASE 50 MCG/ACT spray   Generic drug:  fluticasone      Spray 2 sprays into both nostrils 2 times daily.    Lichen planopilaris       insulin pen needle 32G X 4 MM    BD YURIY U/F    50 each    Use 1 pen needles daily or as directed.    Type 2 diabetes mellitus without complication, without long-term current use of insulin (H)       latanoprost 0.005 % ophthalmic solution    XALATAN     1 drop At Bedtime        liraglutide 18 MG/3ML soln    VICTOZA PEN    12 mL    Take 0.6 to 1.2 mg sq daily, as directed    Type 2 diabetes mellitus without complication, without long-term current use of insulin (H)       losartan-hydrochlorothiazide 50-12.5 MG per tablet    HYZAAR     Take 1 tablet by mouth daily        Minoxidil 5 % Foam    ROGAINE MENS    60 g    Use on affected areas of scalp / eyebrows nightly.    Lichen planopilaris       omeprazole 20 MG CR capsule    priLOSEC     Take 20 mg by mouth daily        pioglitazone 15 MG tablet    ACTOS    90 tablet    Take 1 tablet (15 mg) by mouth daily    Type 2 diabetes mellitus without complication, without long-term current use of insulin (H)       tacrolimus 0.1 % ointment    PROTOPIC    60 g    Use two times daily when flaring.    Frontal fibrosing alopecia, Lichen planopilaris       triamcinolone 0.1 % cream    KENALOG    45 g    Apply to affected symptomatic areas every other day    Lichen planopilaris,  Dermatitis       vitamin D 2000 units tablet      Take 1 tablet by mouth daily.    Lichen planopilaris

## 2018-09-27 DIAGNOSIS — E11.9 TYPE 2 DIABETES MELLITUS WITHOUT COMPLICATION, WITHOUT LONG-TERM CURRENT USE OF INSULIN (H): Primary | ICD-10-CM

## 2018-09-27 RX ORDER — BLOOD SUGAR DIAGNOSTIC
STRIP MISCELLANEOUS
Qty: 200 STRIP | Refills: 3 | Status: SHIPPED | OUTPATIENT
Start: 2018-09-27 | End: 2019-04-08

## 2018-10-02 ENCOUNTER — OFFICE VISIT (OUTPATIENT)
Dept: DERMATOLOGY | Facility: CLINIC | Age: 68
End: 2018-10-02
Payer: COMMERCIAL

## 2018-10-02 VITALS — SYSTOLIC BLOOD PRESSURE: 162 MMHG | DIASTOLIC BLOOD PRESSURE: 79 MMHG | HEART RATE: 76 BPM

## 2018-10-02 DIAGNOSIS — Z79.899 ENCOUNTER FOR LONG-TERM (CURRENT) USE OF HIGH-RISK MEDICATION: Primary | ICD-10-CM

## 2018-10-02 DIAGNOSIS — Z79.899 ENCOUNTER FOR LONG-TERM (CURRENT) USE OF HIGH-RISK MEDICATION: ICD-10-CM

## 2018-10-02 DIAGNOSIS — L66.12 FRONTAL FIBROSING ALOPECIA: ICD-10-CM

## 2018-10-02 LAB
ALBUMIN SERPL-MCNC: 3.8 G/DL (ref 3.4–5)
ALP SERPL-CCNC: 99 U/L (ref 40–150)
ALT SERPL W P-5'-P-CCNC: 22 U/L (ref 0–50)
AST SERPL W P-5'-P-CCNC: 17 U/L (ref 0–45)
BASOPHILS # BLD AUTO: 0.1 10E9/L (ref 0–0.2)
BASOPHILS NFR BLD AUTO: 0.7 %
BILIRUB DIRECT SERPL-MCNC: <0.1 MG/DL (ref 0–0.2)
BILIRUB SERPL-MCNC: 0.2 MG/DL (ref 0.2–1.3)
DIFFERENTIAL METHOD BLD: NORMAL
EOSINOPHIL # BLD AUTO: 0.1 10E9/L (ref 0–0.7)
EOSINOPHIL NFR BLD AUTO: 1.6 %
ERYTHROCYTE [DISTWIDTH] IN BLOOD BY AUTOMATED COUNT: 12.6 % (ref 10–15)
HCT VFR BLD AUTO: 39 % (ref 35–47)
HGB BLD-MCNC: 12.9 G/DL (ref 11.7–15.7)
IMM GRANULOCYTES # BLD: 0 10E9/L (ref 0–0.4)
IMM GRANULOCYTES NFR BLD: 0.4 %
LYMPHOCYTES # BLD AUTO: 1.8 10E9/L (ref 0.8–5.3)
LYMPHOCYTES NFR BLD AUTO: 25.4 %
MCH RBC QN AUTO: 30.8 PG (ref 26.5–33)
MCHC RBC AUTO-ENTMCNC: 33.1 G/DL (ref 31.5–36.5)
MCV RBC AUTO: 93 FL (ref 78–100)
MONOCYTES # BLD AUTO: 0.5 10E9/L (ref 0–1.3)
MONOCYTES NFR BLD AUTO: 7.3 %
NEUTROPHILS # BLD AUTO: 4.5 10E9/L (ref 1.6–8.3)
NEUTROPHILS NFR BLD AUTO: 64.6 %
NRBC # BLD AUTO: 0 10*3/UL
NRBC BLD AUTO-RTO: 0 /100
PLATELET # BLD AUTO: 256 10E9/L (ref 150–450)
PROT SERPL-MCNC: 7.6 G/DL (ref 6.8–8.8)
RBC # BLD AUTO: 4.19 10E12/L (ref 3.8–5.2)
WBC # BLD AUTO: 7 10E9/L (ref 4–11)

## 2018-10-02 RX ORDER — HYDROXYCHLOROQUINE SULFATE 200 MG/1
200 TABLET, FILM COATED ORAL DAILY
Qty: 30 TABLET | Refills: 2 | Status: SHIPPED | OUTPATIENT
Start: 2018-10-02 | End: 2018-12-17

## 2018-10-02 ASSESSMENT — PAIN SCALES - GENERAL: PAINLEVEL: NO PAIN (0)

## 2018-10-02 NOTE — PATIENT INSTRUCTIONS
- use the laser light comb three times a week  - go to lab today for initial safety labs in order to start Plaquenil (hydroxycholoquine)  - talk with your ophthalmologist regarding eye exams for Plaquenil  - continue taking finasteride  - continue using Rogaine  - continue using topical tacrolimus ointment as needed

## 2018-10-02 NOTE — NURSING NOTE
Dermatology Rooming Note    Aleja Gudino's goals for this visit include:   Chief Complaint   Patient presents with     Derm Problem     Aleja is here for a hairloss follow up, states there's been slighty more loss.        Sharmila Kang, MERNAN

## 2018-10-02 NOTE — LETTER
10/2/2018       RE: Aleja Gudino  [address]  [place] [address]     Dear Colleague,    Thank you for referring your patient, Aleja Gudino, to the [facility] DERMATOLOG[facility] at [facility]. Please see a copy of my visit note below.    [facility] Dermatology Note      Dermatology Problem List:  1. Frontal fibrosing alopecia  - Previous tx:2%  ketoconazole shampoo once weekly (stopped 2/2 bumps/itching)  -Rogaine 5% QHS, foam, uses 2x/week  -Finasteride 1mg QDay - reports it may be working - would like to continue  -on Actos (pioglitazone) for almost a year for diabetes, type II, may also be benefiting the FFA    -Start Plaquenil (hydroxychloroquine) 200 mg PO Qday  -laser light comb three times weekly  -tacrolimus ointment PRN for itchy papules  2. History of AKs  3. Seborrheic keratoses    Encounter Date: Oct 2, 2018    CC:   Chief Complaint   Patient presents with     Derm Problem     Aleja is here for a hairloss follow up, states there's been slighty more loss.        History of Present Illness:  Ms. Aleja Gudino is a 68 year old female who presents as a follow-up for frontal fibrosing alopecia. The patient was last seen on 4/23/2018 by Dr. Dillard for a skin check and was seen by Dr. Rachelle osman for her FFA on 2/5/2018 when she continued finasteride, minoxidil foam, laser light comb, and started tacrolimus ointment PRN itchy papules. She expresses her frustration today at the lack of progress of her condition. She has had a stressful last 3 months as she sold her home, moved into a new home and got rid of 60% of her belongings in the process. She has not been using the laser light comb, but still has been using finasteride and minoxidil foam, and tacrolimus ointment PRN. She feels like the side of her head have been stable, however, feels like the hair loss has spread on her frontal scalp. She reports that her frontal scalp is  occasionally itchy, but is never painful. She has had a itchy area on her right occipital scalp for the last two weeks and thinks this is due to a curling iron burn.   She says that her health is otherwise good. Her DM2 is well controlled with a  Recent HgA1c of 6.2 on 2018. Her TSH on that date was also WNL.   She has no other skin concerns today.     Past Medical History:   Patient Active Problem List   Diagnosis     Cicatricial alopecia     Actinic keratosis     Dermatitis     Frontal fibrosing alopecia     Dermatitis, seborrheic     Elevated blood pressure reading     Allergic rhinitis     Breast microcalcifications     Carpal tunnel syndrome     Esophageal reflux     History of bowel resection     HTN (hypertension)     Ingrowing nail     Irritable bowel syndrome     Low back pain radiating to left lower extremity     Obstructive sleep apnea     Osteoarthritis     Other atopic dermatitis and related conditions     Overweight     Pain in joint, shoulder region     Postmenopausal atrophic vaginitis     Primary open angle glaucoma     Encounter for screening for osteoporosis     Thyroid nodule     Type 2 diabetes mellitus without complication, without long-term current use of insulin (H)     Asthma     Essential hypertension     Vitamin D deficiency     History of thyroid nodule     Past Medical History:   Diagnosis Date     Frontal fibrosing alopecia      Glaucoma      Hair loss      Hypertension      Sleep apnea     c-pap     Squamous cell carcinoma      Thyroid nodule      Type 2 diabetes mellitus (H)      Weight loss      Past Surgical History:   Procedure Laterality Date     BIOPSY OF SKIN LESION       CARPAL TUNNEL RELEASE RT/LT        SECTION       CHOLECYSTECTOMY       COLONOSCOPY N/A 3/19/2015    Procedure: COLONOSCOPY;  Surgeon: Ck Childress MD;  Location: [facility]     Excision oral melanotic macule  2013    lower right lip     HC LAP,FULGURATE/EXCISE LESIONS      tumors removed  and colon resection     KNEE SURGERY      arthroscopic- left knee     NO HISTORY OF SURGERY  6/18/13    derm     ORTHOPEDIC SURGERY      right and left knee replacements     RELEASE TRIGGER FINGER       RESECTION ABDOMINAL PERINEAL         Social History:   reports that she has never smoked. She has never used smokeless tobacco. She reports that she does not drink alcohol or use illicit drugs.    Family History:  Family History   Problem Relation Age of Onset     Cancer No family hx of      No family history of skin cancer     Melanoma No family hx of      Skin Cancer No family hx of        Medications:  Current Outpatient Prescriptions   Medication Sig Dispense Refill     ACCU-CHEK CLARISA PLUS test strip Use to test blood sugar 2 times daily or as directed. 200 strip 3     aspirin 81 MG tablet Take  by mouth daily.       blood glucose monitoring (ACCU-CHEK FASTCLIX) lancets 2 times daily  2     Cholecalciferol (VITAMIN D) 2000 UNITS tablet Take 1 tablet by mouth daily.       diclofenac sodium 3 % GEL 2 %        finasteride (PROPECIA) 1 MG tablet TAKE 1 TABLET (1 MG) BY MOUTH DAILY 60 tablet 0     fluticasone (FLONASE) 50 MCG/ACT nasal spray Spray 2 sprays into both nostrils 2 times daily.       insulin pen needle (BD YURIY U/F) 32G X 4 MM Use 1 pen needles daily or as directed. 50 each 11     latanoprost (XALATAN) 0.005 % ophthalmic solution 1 drop At Bedtime       liraglutide (VICTOZA PEN) 18 MG/3ML soln Take 0.6 to 1.2 mg sq daily, as directed 12 mL 3     losartan-hydrochlorothiazide (HYZAAR) 50-12.5 MG per tablet Take 1 tablet by mouth daily  3     Minoxidil (ROGAINE MENS) 5 % FOAM Use on affected areas of scalp / eyebrows nightly. 60 g 11     omeprazole (PRILOSEC) 20 MG CR capsule Take 20 mg by mouth daily  3     pioglitazone (ACTOS) 15 MG tablet Take 1 tablet (15 mg) by mouth daily 90 tablet 3     piroxicam (FELDENE) 10 MG capsule Take 10 mg by mouth daily.       tacrolimus (PROTOPIC) 0.1 % ointment Use two  times daily when flaring. 60 g 0     triamcinolone (KENALOG) 0.1 % cream Apply to affected symptomatic areas every other day 45 g 1        Allergies   Allergen Reactions     Metformin Diarrhea         Review of Systems:  -As per HPI  -Constitutional: The patient denies fatigue, fevers, chills, unintended weight loss, and night sweats.  -HEENT: Patient denies nonhealing oral sores.  -Skin: As above in HPI. No additional skin concerns.    Physical exam:  Vitals: /79 (BP Location: Right arm, Patient Position: Chair, Cuff Size: Adult Regular)  Pulse 76  GEN: This is a well developed, well-nourished female in no acute distress, in a pleasant mood.    SKIN: Focused examination of the scalp, face, hands, nails was performed.  -LPPAI score: 1.83  -Hair regrowth: first layer robust 2cm, second layer 3cm  -Minimal perifollicular erythema and scale particularly at the receeding frontal hairline  -1.5 cm erythematous plaque at the right occipital scalp  -No characteristic facial papules of LPP  -No other lesions of concern on areas examined.   -No other lesions of concern on areas examined.     Impression/Plan:  1. Frontal fibrosing alopecia: appears to have progressed slightly from previous examination with photos in 2/2018. Discussed starting Plaquenil and patient would like to do this    Continue finasteride 1 mg PO Qday    Continue minoxidil topical foam daily    Continue laser light comb three times per week    Continue tacrolimus ointment PRN    Start hydroxycholoquine 200 mg PO Qday  - safety labs today (CBC, hepatic panel)  - patient to talk with opthalmologist regarding her last eye exam to see if she should have new eye exam  - discussed side effects, safety lab monitoring with patient      Follow-up in 2 months, earlier for new or changing lesions.       Dr. Bush staffed the patient.    Staff Involved:  Resident(Hugh Horne)/Staff(as above)    Hugh Horne MD, PhD  Medicine-Dermatology  PGY-3      Again, thank you for allowing me to participate in the care of your patient.      Sincerely,    Zoie Bush MD

## 2018-10-02 NOTE — MR AVS SNAPSHOT
After Visit Summary   10/2/2018    Aleja Gudino    MRN: [id number]           Patient Information     Date Of Birth          1950        Visit Information        Provider Department      10/2/2018 2:40 PM Zoie Bush MD [facility]        Today's Diagnoses     Encounter for long-term (current) use of high-risk medication    -  1    Frontal fibrosing alopecia          Care Instructions    - use the laser light comb three times a week  - go to lab today for initial safety labs in order to start Plaquenil (hydroxycholoquine)  - talk with your ophthalmologist regarding eye exams for Plaquenil  - continue taking finasteride  - continue using Rogaine  - continue using topical tacrolimus ointment as needed            Follow-ups after your visit        Follow-up notes from your care team     Return in about 2 months (around 12/2/2018).      Your next 10 appointments already scheduled     Oct 02, 2018  3:00 PM CDT   LAB with  LAB   [facility] ([facility])    [address] [place] [address] [phone]           Please do not eat 10-12 hours before your appointment if you are coming in fasting for labs on lipids, cholesterol, or glucose (sugar). This does not apply to pregnant women. Water, hot tea and black coffee (with nothing added) are okay. Do not drink other fluids, diet soda or chew gum.            Dec 11, 2018  3:00 PM CST   Return Visit with Hector Thorne MD   [facility] ([facility])    [address] [place] [address]   [phone]            Dec 17, 2018 11:30 AM CST   LAB with  LAB    [facility] ([facility])    [address] [place] [address] [phone]           Please do not eat 10-12 hours before your appointment if you are coming in fasting for labs on lipids, cholesterol, or glucose (sugar). This  does not apply to pregnant women. Water, hot tea and black coffee (with nothing added) are okay. Do not drink other fluids, diet soda or chew gum.            Dec 17, 2018 11:50 AM CST   (Arrive by 11:35 AM)   RETURN HAIRLOSS with Zoie Bush MD   [facility] ([facility])    [address] [address] [phone]              Future tests that were ordered for you today     Open Future Orders        Priority Expected Expires Ordered    CBC with platelets differential Routine  10/2/2019 10/2/2018    Hepatic panel Routine  10/2/2019 10/2/2018    Vitamin D Deficiency Routine  10/2/2019 10/2/2018            Who to contact     Please call your clinic at [phone] to:    Ask questions about your health    Make or cancel appointments    Discuss your medicines    Learn about your test results    Speak to your doctor            Additional Information About Your Visit        [employer] Information     [employer] gives you secure access to your electronic health record. If you see a primary care provider, you can also send messages to your care team and make appointments. If you have questions, please call your primary care clinic.  If you do not have a primary care provider, please call [phone] and they will assist you.      [employer] is an electronic gateway that provides easy, online access to your medical records. With [employer], you can request a clinic appointment, read your test results, renew a prescription or communicate with your care team.     To access your existing account, please contact your [facility] or call [phone] for assistance.        Care EveryWhere ID     This is your Care EveryWhere ID. This could be used by other organizations to access your [place] medical records  [id number]        Your Vitals Were     Pulse                   76            Blood Pressure from Last 3 Encounters:    10/02/18 162/79   09/11/18 140/68   03/05/18 142/82    Weight from Last 3 Encounters:   09/11/18 76.7 kg (169 lb)   03/05/18 78 kg (172 lb)   12/21/15 70.3 kg (155 lb)                 Today's Medication Changes          These changes are accurate as of 10/2/18  2:55 PM.  If you have any questions, ask your nurse or doctor.               Start taking these medicines.        Dose/Directions    hydroxychloroquine 200 MG tablet   Commonly known as:  PLAQUENIL   Used for:  Frontal fibrosing alopecia   Started by:  Zoie Bush MD        Dose:  200 mg   Take 1 tablet (200 mg) by mouth daily   Quantity:  30 tablet   Refills:  2            Where to get your medicines      These medications were sent to [facility] [address] [place] [address] - [address] AT [place] [address] [address]  [address], [facility] [address]     Phone:  [phone]     hydroxychloroquine 200 MG tablet                Primary Care Provider Office Phone # Fax #    Tanmay Theodore Arriola [phone]       [address] [address]        Equal Access to Services     EMERITA WOLF AH: Hadii bev ku hadasho Soomaali, waaxda luqadaha, qaybta kaalmada adeegyada, waxay may haybirdie borges. So [facility] [phone].    ATENCIÓN: Si habla español, tiene a bolanos disposición servicios gratuitos de asistencia lingüística. Llame al [phone].    We comply with applicable federal civil rights laws and [place] laws. We do not discriminate on the basis of race, color, national origin, age, disability, sex, sexual orientation, or gender identity.            Thank you!     Thank you for choosing [facility] DERMATOLOG[facility]  for your care. Our goal is always to provide you with excellent care. Hearing back from our patients is one way we can continue to improve our services. Please take a few minutes to complete the written survey that you may receive in  the mail after your visit with us. Thank you!             Your Updated Medication List - Protect others around you: Learn how to safely use, store and throw away your medicines at www.disposemymeds.org.          This list is accurate as of 10/2/18  2:55 PM.  Always use your most recent med list.                   Brand Name Dispense Instructions for use Diagnosis    ACCU-CHEK CLARISA PLUS test strip   Generic drug:  blood glucose monitoring     200 strip    Use to test blood sugar 2 times daily or as directed.    Type 2 diabetes mellitus without complication, without long-term current use of insulin (H)       aspirin 81 MG tablet      Take  by mouth daily.    Lichen planopilaris       blood glucose monitoring lancets      2 times daily        diclofenac sodium 3 % Gel      2 %        FELDENE 10 MG capsule   Generic drug:  piroxicam      Take 10 mg by mouth daily.    Lichen planopilaris       finasteride 1 MG tablet    PROPECIA    60 tablet    TAKE 1 TABLET (1 MG) BY MOUTH DAILY    Frontal fibrosing alopecia       FLONASE 50 MCG/ACT spray   Generic drug:  fluticasone      Spray 2 sprays into both nostrils 2 times daily.    Lichen planopilaris       hydroxychloroquine 200 MG tablet    PLAQUENIL    30 tablet    Take 1 tablet (200 mg) by mouth daily    Frontal fibrosing alopecia       insulin pen needle 32G X 4 MM    BD YURIY U/F    50 each    Use 1 pen needles daily or as directed.    Type 2 diabetes mellitus without complication, without long-term current use of insulin (H)       latanoprost 0.005 % ophthalmic solution    XALATAN     1 drop At Bedtime        liraglutide 18 MG/3ML soln    VICTOZA PEN    12 mL    Take 0.6 to 1.2 mg sq daily, as directed    Type 2 diabetes mellitus without complication, without long-term current use of insulin (H)       losartan-hydrochlorothiazide 50-12.5 MG per tablet    HYZAAR     Take 1 tablet by mouth daily        Minoxidil 5 % Foam    ROGAINE MENS    60 g    Use on affected areas of  scalp / eyebrows nightly.    Lichen planopilaris       omeprazole 20 MG CR capsule    priLOSEC     Take 20 mg by mouth daily        pioglitazone 15 MG tablet    ACTOS    90 tablet    Take 1 tablet (15 mg) by mouth daily    Type 2 diabetes mellitus without complication, without long-term current use of insulin (H)       tacrolimus 0.1 % ointment    PROTOPIC    60 g    Use two times daily when flaring.    Frontal fibrosing alopecia, Lichen planopilaris       triamcinolone 0.1 % cream    KENALOG    45 g    Apply to affected symptomatic areas every other day    Lichen planopilaris, Dermatitis       vitamin D 2000 units tablet      Take 1 tablet by mouth daily.    Lichen planopilaris

## 2018-10-02 NOTE — PROGRESS NOTES
[facility] Dermatology Note      Dermatology Problem List:  1. Frontal fibrosing alopecia  - Previous tx:2%  ketoconazole shampoo once weekly (stopped 2/2 bumps/itching)  -Rogaine 5% QHS, foam, uses 2x/week  -Finasteride 1mg QDay - reports it may be working - would like to continue  -on Actos (pioglitazone) for almost a year for diabetes, type II, may also be benefiting the FFA    -Start Plaquenil (hydroxychloroquine) 200 mg PO Qday  -laser light comb three times weekly  -tacrolimus ointment PRN for itchy papules    2. History of AKs    3. Seborrheic keratoses    Encounter Date: Oct 2, 2018    CC:   Chief Complaint   Patient presents with     Derm Problem     Aleja is here for a hairloss follow up, states there's been slighty more loss.        History of Present Illness:  Ms. Aleja Gudino is a 68 year old female who presents as a follow-up for frontal fibrosing alopecia. The patient was last seen on 4/23/2018 by Ita AMAYA for a skin check and was seen by Dr. Rachelle osman for her FFA on 2/5/2018 when she continued finasteride, minoxidil foam, laser light comb, and started tacrolimus ointment PRN itchy papules. She expresses her frustration today at the lack of progress of her condition. She has had a stressful last 3 months as she sold her home, moved into a new home and got rid of 60% of her belongings in the process. She has not been using the laser light comb, but still has been using finasteride and minoxidil foam, and tacrolimus ointment PRN. She feels like the side of her head have been stable, however, feels like the hair loss has spread on her frontal scalp. She reports that her frontal scalp is occasionally itchy, but is never painful. She has had a itchy area on her right occipital scalp for the last two weeks and thinks this is due to a curling iron burn.   She says that her health is otherwise good. Her DM2 is well controlled with a  Recent HgA1c of 6.2 on 9/11/2018. Her  TSH on that date was also WNL.   She has no other skin concerns today.     Past Medical History:   Patient Active Problem List   Diagnosis     Cicatricial alopecia     Actinic keratosis     Dermatitis     Frontal fibrosing alopecia     Dermatitis, seborrheic     Elevated blood pressure reading     Allergic rhinitis     Breast microcalcifications     Carpal tunnel syndrome     Esophageal reflux     History of bowel resection     HTN (hypertension)     Ingrowing nail     Irritable bowel syndrome     Low back pain radiating to left lower extremity     Obstructive sleep apnea     Osteoarthritis     Other atopic dermatitis and related conditions     Overweight     Pain in joint, shoulder region     Postmenopausal atrophic vaginitis     Primary open angle glaucoma     Encounter for screening for osteoporosis     Thyroid nodule     Type 2 diabetes mellitus without complication, without long-term current use of insulin (H)     Asthma     Essential hypertension     Vitamin D deficiency     History of thyroid nodule     Past Medical History:   Diagnosis Date     Frontal fibrosing alopecia      Glaucoma      Hair loss      Hypertension      Sleep apnea     c-pap     Squamous cell carcinoma      Thyroid nodule      Type 2 diabetes mellitus (H)      Weight loss      Past Surgical History:   Procedure Laterality Date     BIOPSY OF SKIN LESION       CARPAL TUNNEL RELEASE RT/LT        SECTION       CHOLECYSTECTOMY       COLONOSCOPY N/A 3/19/2015    Procedure: COLONOSCOPY;  Surgeon: Ck Childress MD;  Location: [facility]     Excision oral melanotic macule  2013    lower right lip     HC LAP,FULGURATE/EXCISE LESIONS      tumors removed and colon resection     KNEE SURGERY      arthroscopic- left knee     NO HISTORY OF SURGERY  13    derm     ORTHOPEDIC SURGERY      right and left knee replacements     RELEASE TRIGGER FINGER       RESECTION ABDOMINAL PERINEAL         Social History:   reports that she has never  smoked. She has never used smokeless tobacco. She reports that she does not drink alcohol or use illicit drugs.    Family History:  Family History   Problem Relation Age of Onset     Cancer No family hx of      No family history of skin cancer     Melanoma No family hx of      Skin Cancer No family hx of        Medications:  Current Outpatient Prescriptions   Medication Sig Dispense Refill     ACCU-CHEK CLARIAS PLUS test strip Use to test blood sugar 2 times daily or as directed. 200 strip 3     aspirin 81 MG tablet Take  by mouth daily.       blood glucose monitoring (ACCU-CHEK FASTCLIX) lancets 2 times daily  2     Cholecalciferol (VITAMIN D) 2000 UNITS tablet Take 1 tablet by mouth daily.       diclofenac sodium 3 % GEL 2 %        finasteride (PROPECIA) 1 MG tablet TAKE 1 TABLET (1 MG) BY MOUTH DAILY 60 tablet 0     fluticasone (FLONASE) 50 MCG/ACT nasal spray Spray 2 sprays into both nostrils 2 times daily.       insulin pen needle (BD YURIY U/F) 32G X 4 MM Use 1 pen needles daily or as directed. 50 each 11     latanoprost (XALATAN) 0.005 % ophthalmic solution 1 drop At Bedtime       liraglutide (VICTOZA PEN) 18 MG/3ML soln Take 0.6 to 1.2 mg sq daily, as directed 12 mL 3     losartan-hydrochlorothiazide (HYZAAR) 50-12.5 MG per tablet Take 1 tablet by mouth daily  3     Minoxidil (ROGAINE MENS) 5 % FOAM Use on affected areas of scalp / eyebrows nightly. 60 g 11     omeprazole (PRILOSEC) 20 MG CR capsule Take 20 mg by mouth daily  3     pioglitazone (ACTOS) 15 MG tablet Take 1 tablet (15 mg) by mouth daily 90 tablet 3     piroxicam (FELDENE) 10 MG capsule Take 10 mg by mouth daily.       tacrolimus (PROTOPIC) 0.1 % ointment Use two times daily when flaring. 60 g 0     triamcinolone (KENALOG) 0.1 % cream Apply to affected symptomatic areas every other day 45 g 1        Allergies   Allergen Reactions     Metformin Diarrhea         Review of Systems:  -As per HPI  -Constitutional: The patient denies fatigue, fevers,  chills, unintended weight loss, and night sweats.  -HEENT: Patient denies nonhealing oral sores.  -Skin: As above in HPI. No additional skin concerns.    Physical exam:  Vitals: /79 (BP Location: Right arm, Patient Position: Chair, Cuff Size: Adult Regular)  Pulse 76  GEN: This is a well developed, well-nourished female in no acute distress, in a pleasant mood.    SKIN: Focused examination of the scalp, face, hands, nails was performed.  -LPPAI score: 1.83  -Hair regrowth: first layer robust 2cm, second layer 3cm  -Minimal perifollicular erythema and scale particularly at the receeding frontal hairline  -1.5 cm erythematous plaque at the right occipital scalp  -No characteristic facial papules of LPP  -No other lesions of concern on areas examined.   -No other lesions of concern on areas examined.     Impression/Plan:  1. Frontal fibrosing alopecia: appears to have progressed slightly from previous examination with photos in 2/2018. Discussed starting Plaquenil and patient would like to do this    Continue finasteride 1 mg PO Qday    Continue minoxidil topical foam daily    Continue laser light comb three times per week    Continue tacrolimus ointment PRN    Start hydroxycholoquine 200 mg PO Qday and if tolerated, OK to increase to twice a day  - safety labs today (CBC, hepatic panel)  - patient to talk with opthalmologist regarding her last eye exam to see if she should have new eye exam  - discussed side effects, safety lab monitoring with patient      Follow-up in 2 months, earlier for new or changing lesions.       Dr. Bush staffed the patient.    Staff Involved:  Resident(Hugh Horne)/Staff(as above)    Hugh Horne MD, PhD  Medicine-Dermatology PGY-3      Patient was seen and examined with the medicine/dermatology resident. I agree with the history, review of systems, physical examination, assessments and plan.    Zoei Bush MD  Professor and  Chair  Department of Dermatology  [facility]

## 2018-10-03 LAB — DEPRECATED CALCIDIOL+CALCIFEROL SERPL-MC: 39 UG/L (ref 20–75)

## 2018-10-23 ENCOUNTER — TELEPHONE (OUTPATIENT)
Dept: ENDOCRINOLOGY | Facility: CLINIC | Age: 68
End: 2018-10-23

## 2018-10-23 DIAGNOSIS — E11.9 TYPE 2 DIABETES MELLITUS WITHOUT COMPLICATION, WITHOUT LONG-TERM CURRENT USE OF INSULIN (H): Primary | ICD-10-CM

## 2018-10-23 NOTE — TELEPHONE ENCOUNTER
I spoke to Aleja and she is still using the Accucheck fastclix lancets. Refill pended for approval.

## 2018-10-23 NOTE — TELEPHONE ENCOUNTER
Reason for Call:  Medication or medication refill:    Do you use a [facility]?  Name of the pharmacy and phone number for the current request:       [facility] [address] [place] [address] [id number] AT [place] [address] & [facility]        Name of the medication requested: Lancets    Other request: none    Can we leave a detailed message on this number? YES    Phone number patient can be reached at: Cell number on file:    Telephone Information:   Mobile [phone]       Best Time: anytime    Call taken on 10/23/2018 at 9:24 AM by Mike Nicole

## 2018-10-24 RX ORDER — LANCETS
EACH MISCELLANEOUS
Qty: 200 EACH | Refills: 3 | Status: SHIPPED | OUTPATIENT
Start: 2018-10-24 | End: 2019-04-08

## 2018-10-28 DIAGNOSIS — L66.12 FRONTAL FIBROSING ALOPECIA: ICD-10-CM

## 2018-10-29 RX ORDER — FINASTERIDE 1 MG/1
1 TABLET, FILM COATED ORAL DAILY
Qty: 60 TABLET | Refills: 5 | Status: SHIPPED | OUTPATIENT
Start: 2018-10-29 | End: 2019-04-08

## 2018-12-11 ENCOUNTER — OFFICE VISIT (OUTPATIENT)
Dept: ENDOCRINOLOGY | Facility: CLINIC | Age: 68
End: 2018-12-11
Payer: COMMERCIAL

## 2018-12-11 VITALS
HEIGHT: 63 IN | HEART RATE: 74 BPM | SYSTOLIC BLOOD PRESSURE: 123 MMHG | BODY MASS INDEX: 29.86 KG/M2 | DIASTOLIC BLOOD PRESSURE: 71 MMHG | WEIGHT: 168.5 LBS

## 2018-12-11 DIAGNOSIS — E11.9 TYPE 2 DIABETES MELLITUS WITHOUT COMPLICATION, WITHOUT LONG-TERM CURRENT USE OF INSULIN (H): Primary | ICD-10-CM

## 2018-12-11 DIAGNOSIS — Z86.39 HISTORY OF THYROID NODULE: ICD-10-CM

## 2018-12-11 PROCEDURE — 99214 OFFICE O/P EST MOD 30 MIN: CPT | Performed by: INTERNAL MEDICINE

## 2018-12-11 RX ORDER — LOSARTAN POTASSIUM AND HYDROCHLOROTHIAZIDE 12.5; 1 MG/1; MG/1
TABLET ORAL
Refills: 0 | COMMUNITY
Start: 2018-11-26

## 2018-12-11 ASSESSMENT — MIFFLIN-ST. JEOR: SCORE: 1253.44

## 2018-12-11 NOTE — PROGRESS NOTES
Name: Aleja Gudino      HPI:  Recent issues:  Here for f/u diabetes evaluation  Now taking Plaquenil medication 2018 per Dr. Bush for management of alopecia  She wonders if this med has benefit        . Diagnosis of diabetes mellitus  ...Details of initial evaluation not available, body wt near 215#  Initial treatment with diet management  . Began metformin medication, but GI intolerance  Subsequent use of glimeparide, Tradjenta, basal insulin, Victoza, pioglitazone  . Began weight loss plan  Current DM meds:   Victoza 0.6 mg daily   Pioglitazone 15 mg daily  Using Accucheck BG meter, tests 1-2x/day   Recent trends 92- 129 mg/dl, avg 113 mg/dl    Recent [facility] labs include:      Recent [facility] labs include:  Lab Results   Component Value Date    A1C 6.2 (H) 2018     2018    POTASSIUM 3.9 2018    CHLORIDE 104 2018    CO2 26 2018    ANIONGAP 9 2018    GLC 91 2018    BUN 16 2018    CR 0.76 2018    GFRESTIMATED 75 2018    GFRESTBLACK >90 2018    HUANG 8.4 (L) 2018     Lab Results   Component Value Date    TSH 1.22 2018     Fam Hx diabetes: none  Last eye exam with Dr. Matute 2018, no DR  DM Complications: none known      Sees Dr. Tanmay Arriola/[facility] for general medicine evaluations.  Also sees Dr. Bush/[facility]    PMH/PSH:  Past Medical History:   Diagnosis Date     Frontal fibrosing alopecia      Glaucoma      Hair loss      Hypertension      Sleep apnea     c-pap     Squamous cell carcinoma      Thyroid nodule      Type 2 diabetes mellitus (H)      Weight loss      Past Surgical History:   Procedure Laterality Date     BIOPSY OF SKIN LESION       CARPAL TUNNEL RELEASE RT/LT        SECTION       CHOLECYSTECTOMY       COLONOSCOPY N/A 3/19/2015    Procedure: COLONOSCOPY;  Surgeon: Ck Childress MD;  Location:  GI     Excision oral melanotic macule  2013    lower right  lip     HC LAP,FULGURATE/EXCISE LESIONS      tumors removed and colon resection     KNEE SURGERY      arthroscopic- left knee     NO HISTORY OF SURGERY  6/18/13    derm     ORTHOPEDIC SURGERY      right and left knee replacements     RELEASE TRIGGER FINGER       RESECTION ABDOMINAL PERINEAL         Family Hx:  Family History   Problem Relation Age of Onset     Cancer No family hx of         No family history of skin cancer     Melanoma No family hx of      Skin Cancer No family hx of          Social Hx:  Social History     Socioeconomic History     Marital status:      Spouse name: Not on file     Number of children: Not on file     Years of education: Not on file     Highest education level: Not on file   Social Needs     Financial resource strain: Not on file     Food insecurity - worry: Not on file     Food insecurity - inability: Not on file     Transportation needs - medical: Not on file     Transportation needs - non-medical: Not on file   Occupational History     Not on file   Tobacco Use     Smoking status: Never Smoker     Smokeless tobacco: Never Used   Substance and Sexual Activity     Alcohol use: No     Alcohol/week: 0.0 oz     Drug use: No     Sexual activity: Not on file   Other Topics Concern     Parent/sibling w/ CABG, MI or angioplasty before 65F 55M? Not Asked   Social History Narrative     Not on file          MEDICATIONS:  has a current medication list which includes the following prescription(s): accu-chek pedro pablo plus, aspirin, blood glucose monitoring, vitamin d3, diclofenac sodium, finasteride, fluticasone, hydroxychloroquine, insulin pen needle, latanoprost, liraglutide, losartan potassium-hctz, losartan-hydrochlorothiazide, minoxidil, omeprazole, pioglitazone, piroxicam, tacrolimus, and triamcinolone.    ROS:     ROS: 10 point ROS neg other than the symptoms noted above in the HPI.    GENERAL: mild fatigue, wt stable; denies fevers, chills, night sweats.   HEENT: no dysphagia,  "odonophagia, diplopia, neck pain  THYROID:  no apparent hyper or hypothyroid symptoms  CV: no chest pain, pressure, palpitations  LUNGS: no SOB, SIDHU, cough, wheezing   ABDOMEN: no diarrhea, constipation, abdominal pain  EXTREMITIES: no rashes, ulcers, edema  NEUROLOGY: positional dizziness; no headaches, denies changes in vision, tingling, extremitiy numbness   MSK: some left foot weakness; no muscle weakness  SKIN: scalp hair thinning; no rashes or lesions  :  No menses  PSYCH:  stable mood, no significant anxiety or depression  ENDOCRINE: no heat or cold intolerance    Physical Exam   VS: /71   Pulse 74   Ht 1.6 m (5' 3\")   Wt 76.4 kg (168 lb 8 oz)   BMI 29.85 kg/m    GENERAL: AXOX3, NAD, well dressed, answering questions appropriately, appears stated age.  THYROID:  normal gland, no apparent nodules or goiter  ABDOMEN: mildly obese, soft, nontender, nondistended  EXTREMITIES: no edema, +pedal pulses, no lesions  NEUROLOGY: CN grossly intact, no tremors  MSK: grossly intact  SKIN: scalp hair thinning anteriorly; no rashes, no lesions    LABS:    All pertinent notes, labs, and images personally reviewed by me.     A/P:  Encounter Diagnoses   Name Primary?     Type 2 diabetes mellitus without complication, without long-term current use of insulin (H) Yes     History of thyroid nodule        Comments:  Reviewed health history and diabetes issues.    Plan:  Discussed general issues with the diabetes diagnosis and management  We discussed the hgbA1c test which reflects previous overall glucose levels or control  Discussed the importance of blood glucose (BG) testing to assess glucose trends  Provided general overview of the diabetes medication options and medication treatment plan.    Recommend:  Reviewed diabetes medication treatment options  Continue current low dose Victoza and pioglitazone medications, though we discussed idea of stopping pioglitazone  Check FBG daily or every other day, goal target "  mg/dl  No lab tests ordered for today  Monitor for symptom changes  Keep focus on diet, exercise, weight management.  Encouraged her to contact me if diabetes med or pharmacy questions with a new insurance plan in 2019   She anticipates change to either [email or web address] or [facility] pharmacy  Advise having fasting lipid panel testing and dilated eye examination, at least annually    Review the hair thinning and plaquenil med use questions with dermatologist  Addressed patient questions today    Labs ordered today:   No orders of the defined types were placed in this encounter.    Radiology/Consults ordered today: None    More than 50% of the time spent with Mrs. Gudino on counseling / coordinating her care.  Total appointment time was 30 minutes.    Follow-up:  5 mo    Hector Thorne MD  Endocrinology  [facility]

## 2018-12-13 ENCOUNTER — PATIENT OUTREACH (OUTPATIENT)
Dept: CARE COORDINATION | Facility: CLINIC | Age: 68
End: 2018-12-13

## 2018-12-14 DIAGNOSIS — Z79.899 ENCOUNTER FOR LONG-TERM (CURRENT) USE OF HIGH-RISK MEDICATION: Primary | ICD-10-CM

## 2018-12-17 ENCOUNTER — OFFICE VISIT (OUTPATIENT)
Dept: DERMATOLOGY | Facility: CLINIC | Age: 68
End: 2018-12-17
Payer: COMMERCIAL

## 2018-12-17 VITALS — DIASTOLIC BLOOD PRESSURE: 78 MMHG | HEART RATE: 74 BPM | SYSTOLIC BLOOD PRESSURE: 136 MMHG

## 2018-12-17 DIAGNOSIS — L66.12 FRONTAL FIBROSING ALOPECIA: Primary | ICD-10-CM

## 2018-12-17 DIAGNOSIS — L29.9 PRURITUS: ICD-10-CM

## 2018-12-17 DIAGNOSIS — Z79.899 ENCOUNTER FOR LONG-TERM (CURRENT) USE OF HIGH-RISK MEDICATION: ICD-10-CM

## 2018-12-17 DIAGNOSIS — L66.12 FRONTAL FIBROSING ALOPECIA: ICD-10-CM

## 2018-12-17 LAB
ALBUMIN SERPL-MCNC: 3.8 G/DL (ref 3.4–5)
ALP SERPL-CCNC: 88 U/L (ref 40–150)
ALT SERPL W P-5'-P-CCNC: 21 U/L (ref 0–50)
ANION GAP SERPL CALCULATED.3IONS-SCNC: 8 MMOL/L (ref 3–14)
AST SERPL W P-5'-P-CCNC: 18 U/L (ref 0–45)
BASOPHILS # BLD AUTO: 0.1 10E9/L (ref 0–0.2)
BASOPHILS NFR BLD AUTO: 0.8 %
BILIRUB SERPL-MCNC: 0.4 MG/DL (ref 0.2–1.3)
BUN SERPL-MCNC: 26 MG/DL (ref 7–30)
CALCIUM SERPL-MCNC: 8.9 MG/DL (ref 8.5–10.1)
CHLORIDE SERPL-SCNC: 102 MMOL/L (ref 94–109)
CO2 SERPL-SCNC: 28 MMOL/L (ref 20–32)
CREAT SERPL-MCNC: 0.7 MG/DL (ref 0.52–1.04)
DIFFERENTIAL METHOD BLD: NORMAL
EOSINOPHIL # BLD AUTO: 0.1 10E9/L (ref 0–0.7)
EOSINOPHIL NFR BLD AUTO: 0.8 %
ERYTHROCYTE [DISTWIDTH] IN BLOOD BY AUTOMATED COUNT: 12.6 % (ref 10–15)
GFR SERPL CREATININE-BSD FRML MDRD: 83 ML/MIN/1.7M2
GLUCOSE SERPL-MCNC: 92 MG/DL (ref 70–99)
HCT VFR BLD AUTO: 39.3 % (ref 35–47)
HGB BLD-MCNC: 13.2 G/DL (ref 11.7–15.7)
IMM GRANULOCYTES # BLD: 0 10E9/L (ref 0–0.4)
IMM GRANULOCYTES NFR BLD: 0.2 %
LYMPHOCYTES # BLD AUTO: 1.2 10E9/L (ref 0.8–5.3)
LYMPHOCYTES NFR BLD AUTO: 20.1 %
MCH RBC QN AUTO: 31.4 PG (ref 26.5–33)
MCHC RBC AUTO-ENTMCNC: 33.6 G/DL (ref 31.5–36.5)
MCV RBC AUTO: 94 FL (ref 78–100)
MONOCYTES # BLD AUTO: 0.5 10E9/L (ref 0–1.3)
MONOCYTES NFR BLD AUTO: 7.8 %
NEUTROPHILS # BLD AUTO: 4.2 10E9/L (ref 1.6–8.3)
NEUTROPHILS NFR BLD AUTO: 70.3 %
NRBC # BLD AUTO: 0 10*3/UL
NRBC BLD AUTO-RTO: 0 /100
PLATELET # BLD AUTO: 256 10E9/L (ref 150–450)
POTASSIUM SERPL-SCNC: 4.3 MMOL/L (ref 3.4–5.3)
PROT SERPL-MCNC: 7.4 G/DL (ref 6.8–8.8)
RBC # BLD AUTO: 4.2 10E12/L (ref 3.8–5.2)
SODIUM SERPL-SCNC: 138 MMOL/L (ref 133–144)
WBC # BLD AUTO: 6 10E9/L (ref 4–11)

## 2018-12-17 ASSESSMENT — PAIN SCALES - GENERAL: PAINLEVEL: NO PAIN (0)

## 2018-12-17 NOTE — LETTER
12/17/2018       RE: Aleja Gudino  [address]  [facility] [address]     Dear Colleague,    Thank you for referring your patient, Aleja Gudino, to the [facility] DERMATOLOGY at [facility]. Please see a copy of my visit note below.    [facility] Dermatology Note      Dermatology Problem List:  1. Frontal fibrosing alopecia - current regimen: plaquenil 200mg every other day (tapering from every day), laser light comb three times weekly, tacrolimus ointment PRN for itchy papules, pioglitazone for diabetes, type II, may also be benefiting FFA    -Previous tx:2%  ketoconazole shampoo once weekly (stopped 2/2 bumps/itching), Rogaine (did not like to apply), Finasteride 1mg QDay (discontinued as patient wants to minimize treatment), Plaquenil (qd tapered to every other day per patient request)  2. History of AKs     Encounter Date: Dec 17, 2018    CC:   Chief Complaint   Patient presents with     Hair Loss     Frontal fibrosing alopecia - Aleja notes that her scalp is stable.       History of Present Illness:  Ms. Aleja Gudino is a 68 year old female who presents as a follow-up for frontal fibrosing alopecia. The patient was last seen 10/2/18 when she was started on Plaquenil 200 mg once daily with the goal of increasing to twice daily due to persistent symptoms of itching.  She was continued on her low-level laser light cold, Rogaine, finasteride, and pioglitazone which she also takes for diabetes.  Today, the patient reports overall improvement in her itch since her last visit.  However today, she is interested in decreasing therapies as she knows she will not get hair regrowth.  Particularly, she would like to stop taking the Plaquenil and simplify her regimen at home.  She does not like using the Rogaine and is hoping to stop it to if it will not be of detriment to her therapy.  She is otherwise feeling well today and is in her usual state of  health.    Past Medical History:   Patient Active Problem List   Diagnosis     Cicatricial alopecia     Actinic keratosis     Dermatitis     Frontal fibrosing alopecia     Dermatitis, seborrheic     Elevated blood pressure reading     Allergic rhinitis     Breast microcalcifications     Carpal tunnel syndrome     Esophageal reflux     History of bowel resection     HTN (hypertension)     Ingrowing nail     Irritable bowel syndrome     Low back pain radiating to left lower extremity     Obstructive sleep apnea     Osteoarthritis     Other atopic dermatitis and related conditions     Overweight     Pain in joint, shoulder region     Postmenopausal atrophic vaginitis     Primary open angle glaucoma     Encounter for screening for osteoporosis     Thyroid nodule     Type 2 diabetes mellitus without complication, without long-term current use of insulin (H)     Asthma     Essential hypertension     Vitamin D deficiency     History of thyroid nodule     Past Medical History:   Diagnosis Date     Frontal fibrosing alopecia      Glaucoma      Hair loss      Hypertension      Sleep apnea     c-pap     Squamous cell carcinoma      Thyroid nodule      Type 2 diabetes mellitus (H)      Weight loss      Past Surgical History:   Procedure Laterality Date     BIOPSY OF SKIN LESION       CARPAL TUNNEL RELEASE RT/LT        SECTION       CHOLECYSTECTOMY       COLONOSCOPY N/A 3/19/2015    Procedure: COLONOSCOPY;  Surgeon: Ck Childress MD;  Location: [facility]     Excision oral melanotic macule  2013    lower right lip     HC LAP,FULGURATE/EXCISE LESIONS      tumors removed and colon resection     KNEE SURGERY      arthroscopic- left knee     NO HISTORY OF SURGERY  13    derm     ORTHOPEDIC SURGERY      right and left knee replacements     RELEASE TRIGGER FINGER       RESECTION ABDOMINAL PERINEAL         Social History:   reports that  has never smoked. she has never used smokeless tobacco. She reports that she  does not drink alcohol or use drugs.    Family History:  Family History   Problem Relation Age of Onset     Cancer No family hx of         No family history of skin cancer     Melanoma No family hx of      Skin Cancer No family hx of        Medications:  Current Outpatient Medications   Medication Sig Dispense Refill     ACCU-CHEK CLARISA PLUS test strip Use to test blood sugar 2 times daily or as directed. 200 strip 3     aspirin 81 MG tablet Take  by mouth daily.       blood glucose monitoring (ACCU-CHEK FASTCLIX) lancets Use 2x daily for blood glucose testing 200 each 3     Cholecalciferol (VITAMIN D) 2000 UNITS tablet Take 1 tablet by mouth daily.       diclofenac sodium 3 % GEL 2 %        finasteride (PROPECIA) 1 MG tablet Take 1 tablet (1 mg) by mouth daily 60 tablet 5     fluticasone (FLONASE) 50 MCG/ACT nasal spray Spray 2 sprays into both nostrils 2 times daily.       hydroxychloroquine (PLAQUENIL) 200 MG tablet Take 1 tablet (200 mg) by mouth daily 30 tablet 2     insulin pen needle (BD YURIY U/F) 32G X 4 MM Use 1 pen needles daily or as directed. 50 each 11     latanoprost (XALATAN) 0.005 % ophthalmic solution 1 drop At Bedtime       liraglutide (VICTOZA PEN) 18 MG/3ML soln Take 0.6 to 1.2 mg sq daily, as directed 12 mL 3     LOSARTAN POTASSIUM-HCTZ PO Take 1 tablet by mouth daily 100 mg  3     losartan-hydrochlorothiazide (HYZAAR) 100-12.5 MG tablet TK 1 T PO QD  0     Minoxidil (ROGAINE MENS) 5 % FOAM Use on affected areas of scalp / eyebrows nightly. 60 g 11     omeprazole (PRILOSEC) 20 MG CR capsule Take 20 mg by mouth daily  3     pioglitazone (ACTOS) 15 MG tablet Take 1 tablet (15 mg) by mouth daily 90 tablet 3     piroxicam (FELDENE) 10 MG capsule Take 10 mg by mouth daily.       tacrolimus (PROTOPIC) 0.1 % ointment Use two times daily when flaring. 60 g 0     triamcinolone (KENALOG) 0.1 % cream Apply to affected symptomatic areas every other day 45 g 1        Allergies   Allergen Reactions      Metformin Diarrhea       Review of Systems:  -Constitutional: The patient denies fatigue, fevers, chills, unintended weight loss, and night sweats.  -HEENT: Patient denies nonhealing oral sores.  -Skin: As above in HPI. No additional skin concerns.    Physical exam:  Vitals: /78 (BP Location: Right arm, Patient Position: Sitting, Cuff Size: Adult Large)   Pulse 74   GEN: This is a well developed, well-nourished female in no acute distress, in a pleasant mood.    SKIN: Focused examination of the scalp was performed.  -Minimal perifollicular erythema through frontal and crown of scalp  -Hair regrowth layers: first layer robust 2cm, second layer 3cm  -No other lesions of concern on areas examined.     Impression/Plan:  1. Frontal fibrosing alopecia.  Appears to have improved from previous examination with photos in October 2018.  However patient would like to minimize therapy today, particularly orals.    LPPAI score 0.67, improved from prior visit.    Discussed disease etiology and natural course in detail with patient.  Discussed that while hair loss once scarred is permanent, we are aiming to treat inflammation and disease progression with oral and topical therapies.  Informed her that decrease itch she is experiencing is likely due to the addition of Plaquenil to her regimen.  With this knowledge, the patient still chooses to discontinue medications at this time.    Stop finasteride    Decrease Plaquenil to 200 mg every other day.  She will continue this medication until next visit.    Continue low-level laser comb 3 times weekly    Stop Rogaine     Can continue tacrolimus as needed to any itchy bumps.        Follow-up in 3 months, earlier for new or changing lesions.       Dr. Bush staffed the patient.    Staff Involved:  Resident(Mabel Echeverria)/Staff(as above)    Mabel Echeverria MD  PGY-2, Dermatology    Patient was seen and examined with the dermatology resident. I agree with the history,  review of systems, physical examination, assessments and plan.    Zoie Bush MD  Professor and  Chair  Department of Dermatology  [facility]        Pictures were placed in Pt's chart today for future reference.            Again, thank you for allowing me to participate in the care of your patient.      Sincerely,    Zoie Bush MD

## 2018-12-17 NOTE — PATIENT INSTRUCTIONS
Decrease the plaquenil (hydroxychloroquine) to one pill every other day  Stop the finasteride  Keep using the laser comb  Can stop using Rogaine      [employer]  [email or web address]

## 2018-12-17 NOTE — NURSING NOTE
Dermatology Rooming Note    Aleja Gudino's goals for this visit include:   Chief Complaint   Patient presents with     Hair Loss     Frontal fibrosing alopecia - Aleja notes that her scalp is stable.     Niya Meade, CMA

## 2018-12-17 NOTE — PROGRESS NOTES
[facility] Dermatology Note      Dermatology Problem List:  1. Frontal fibrosing alopecia - current regimen: plaquenil 200mg every other day (tapering from every day), laser light comb three times weekly, tacrolimus ointment PRN for itchy papules, pioglitazone for diabetes, type II, may also be benefiting FFA    -Previous tx:2%  ketoconazole shampoo once weekly (stopped 2/2 bumps/itching), Rogaine (did not like to apply), Finasteride 1mg QDay (discontinued as patient wants to minimize treatment), Plaquenil (qd tapered to every other day per patient request)  2. History of AKs     Encounter Date: Dec 17, 2018    CC:   Chief Complaint   Patient presents with     Hair Loss     Frontal fibrosing alopecia - Aleja notes that her scalp is stable.       History of Present Illness:  Ms. Aleja Gudino is a 68 year old female who presents as a follow-up for frontal fibrosing alopecia. The patient was last seen 10/2/18 when she was started on Plaquenil 200 mg once daily with the goal of increasing to twice daily due to persistent symptoms of itching.  She was continued on her low-level laser light cold, Rogaine, finasteride, and pioglitazone which she also takes for diabetes.  Today, the patient reports overall improvement in her itch since her last visit.  However today, she is interested in decreasing therapies as she knows she will not get hair regrowth.  Particularly, she would like to stop taking the Plaquenil and simplify her regimen at home.  She does not like using the Rogaine and is hoping to stop it to if it will not be of detriment to her therapy.  She is otherwise feeling well today and is in her usual state of health.    Past Medical History:   Patient Active Problem List   Diagnosis     Cicatricial alopecia     Actinic keratosis     Dermatitis     Frontal fibrosing alopecia     Dermatitis, seborrheic     Elevated blood pressure reading     Allergic rhinitis     Breast microcalcifications      Carpal tunnel syndrome     Esophageal reflux     History of bowel resection     HTN (hypertension)     Ingrowing nail     Irritable bowel syndrome     Low back pain radiating to left lower extremity     Obstructive sleep apnea     Osteoarthritis     Other atopic dermatitis and related conditions     Overweight     Pain in joint, shoulder region     Postmenopausal atrophic vaginitis     Primary open angle glaucoma     Encounter for screening for osteoporosis     Thyroid nodule     Type 2 diabetes mellitus without complication, without long-term current use of insulin (H)     Asthma     Essential hypertension     Vitamin D deficiency     History of thyroid nodule     Past Medical History:   Diagnosis Date     Frontal fibrosing alopecia      Glaucoma      Hair loss      Hypertension      Sleep apnea     c-pap     Squamous cell carcinoma      Thyroid nodule      Type 2 diabetes mellitus (H)      Weight loss      Past Surgical History:   Procedure Laterality Date     BIOPSY OF SKIN LESION       CARPAL TUNNEL RELEASE RT/LT        SECTION       CHOLECYSTECTOMY       COLONOSCOPY N/A 3/19/2015    Procedure: COLONOSCOPY;  Surgeon: Ck Childress MD;  Location: [facility]     Excision oral melanotic macule  2013    lower right lip     HC LAP,FULGURATE/EXCISE LESIONS      tumors removed and colon resection     KNEE SURGERY      arthroscopic- left knee     NO HISTORY OF SURGERY  13    derm     ORTHOPEDIC SURGERY      right and left knee replacements     RELEASE TRIGGER FINGER       RESECTION ABDOMINAL PERINEAL         Social History:   reports that  has never smoked. she has never used smokeless tobacco. She reports that she does not drink alcohol or use drugs.    Family History:  Family History   Problem Relation Age of Onset     Cancer No family hx of         No family history of skin cancer     Melanoma No family hx of      Skin Cancer No family hx of        Medications:  Current Outpatient Medications    Medication Sig Dispense Refill     ACCU-CHEK CLARISA PLUS test strip Use to test blood sugar 2 times daily or as directed. 200 strip 3     aspirin 81 MG tablet Take  by mouth daily.       blood glucose monitoring (ACCU-CHEK FASTCLIX) lancets Use 2x daily for blood glucose testing 200 each 3     Cholecalciferol (VITAMIN D) 2000 UNITS tablet Take 1 tablet by mouth daily.       diclofenac sodium 3 % GEL 2 %        finasteride (PROPECIA) 1 MG tablet Take 1 tablet (1 mg) by mouth daily 60 tablet 5     fluticasone (FLONASE) 50 MCG/ACT nasal spray Spray 2 sprays into both nostrils 2 times daily.       hydroxychloroquine (PLAQUENIL) 200 MG tablet Take 1 tablet (200 mg) by mouth daily 30 tablet 2     insulin pen needle (BD YURIY U/F) 32G X 4 MM Use 1 pen needles daily or as directed. 50 each 11     latanoprost (XALATAN) 0.005 % ophthalmic solution 1 drop At Bedtime       liraglutide (VICTOZA PEN) 18 MG/3ML soln Take 0.6 to 1.2 mg sq daily, as directed 12 mL 3     LOSARTAN POTASSIUM-HCTZ PO Take 1 tablet by mouth daily 100 mg  3     losartan-hydrochlorothiazide (HYZAAR) 100-12.5 MG tablet TK 1 T PO QD  0     Minoxidil (ROGAINE MENS) 5 % FOAM Use on affected areas of scalp / eyebrows nightly. 60 g 11     omeprazole (PRILOSEC) 20 MG CR capsule Take 20 mg by mouth daily  3     pioglitazone (ACTOS) 15 MG tablet Take 1 tablet (15 mg) by mouth daily 90 tablet 3     piroxicam (FELDENE) 10 MG capsule Take 10 mg by mouth daily.       tacrolimus (PROTOPIC) 0.1 % ointment Use two times daily when flaring. 60 g 0     triamcinolone (KENALOG) 0.1 % cream Apply to affected symptomatic areas every other day 45 g 1        Allergies   Allergen Reactions     Metformin Diarrhea       Review of Systems:  -Constitutional: The patient denies fatigue, fevers, chills, unintended weight loss, and night sweats.  -HEENT: Patient denies nonhealing oral sores.  -Skin: As above in HPI. No additional skin concerns.    Physical exam:  Vitals: /78 (BP  Location: Right arm, Patient Position: Sitting, Cuff Size: Adult Large)   Pulse 74   GEN: This is a well developed, well-nourished female in no acute distress, in a pleasant mood.    SKIN: Focused examination of the scalp was performed.  -Minimal perifollicular erythema through frontal and crown of scalp  -Hair regrowth layers: first layer robust 2cm, second layer 3cm  -No other lesions of concern on areas examined.     Impression/Plan:  1. Frontal fibrosing alopecia.  Appears to have improved from previous examination with photos in October 2018.  However patient would like to minimize therapy today, particularly orals.    LPPAI score 0.67, improved from prior visit.    Discussed disease etiology and natural course in detail with patient.  Discussed that while hair loss once scarred is permanent, we are aiming to treat inflammation and disease progression with oral and topical therapies.  Informed her that decrease itch she is experiencing is likely due to the addition of Plaquenil to her regimen.  With this knowledge, the patient still chooses to discontinue medications at this time.    Stop finasteride    Decrease Plaquenil to 200 mg every other day.  She will continue this medication until next visit.    Continue low-level laser comb 3 times weekly    Stop Rogaine     Can continue tacrolimus as needed to any itchy bumps.        Follow-up in 3 months, earlier for new or changing lesions.       Dr. Bush staffed the patient.    Staff Involved:  Resident(Mabel Echeverria)/Staff(as above)    Mabel Echeverria MD  PGY-2, Dermatology    Patient was seen and examined with the dermatology resident. I agree with the history, review of systems, physical examination, assessments and plan.    Zoie Bush MD  Professor and  Chair  Department of Dermatology  [facility]

## 2018-12-18 RX ORDER — HYDROXYCHLOROQUINE SULFATE 200 MG/1
200 TABLET, FILM COATED ORAL EVERY OTHER DAY
Qty: 30 TABLET | Refills: 2 | Status: SHIPPED | OUTPATIENT
Start: 2018-12-18 | End: 2019-04-08

## 2018-12-18 NOTE — TELEPHONE ENCOUNTER
Pt was recently seen by Dr. Buhs yesterday for LPP. I reviewed her CBC and CMP, which were unremarkable. Per the plan of Dr. Bush in her note, I will Rx HCQ 200mg QODay.

## 2019-01-17 ENCOUNTER — MYC MEDICAL ADVICE (OUTPATIENT)
Dept: ENDOCRINOLOGY | Facility: CLINIC | Age: 69
End: 2019-01-17

## 2019-01-17 DIAGNOSIS — E11.9 TYPE 2 DIABETES MELLITUS WITHOUT COMPLICATION, WITHOUT LONG-TERM CURRENT USE OF INSULIN (H): ICD-10-CM

## 2019-01-17 RX ORDER — PIOGLITAZONEHYDROCHLORIDE 15 MG/1
15 TABLET ORAL DAILY
Qty: 90 TABLET | Refills: 3 | Status: SHIPPED | OUTPATIENT
Start: 2019-01-17 | End: 2020-07-08

## 2019-01-17 RX ORDER — LIRAGLUTIDE 6 MG/ML
INJECTION SUBCUTANEOUS
Qty: 12 ML | Refills: 3 | Status: CANCELLED | OUTPATIENT
Start: 2019-01-17

## 2019-01-17 RX ORDER — LIRAGLUTIDE 6 MG/ML
INJECTION SUBCUTANEOUS
Qty: 12 ML | Refills: 3 | Status: SHIPPED | OUTPATIENT
Start: 2019-01-17 | End: 2020-01-30

## 2019-01-17 RX ORDER — PIOGLITAZONEHYDROCHLORIDE 15 MG/1
15 TABLET ORAL DAILY
Qty: 90 TABLET | Refills: 3 | OUTPATIENT
Start: 2019-01-17

## 2019-03-22 NOTE — TELEPHONE ENCOUNTER
Attempted to call patient. Stated she doesn't know how I am and then hung up the phone.   Central Prior Authorization Team   Phone: [phone]  Fax: [phone]    PA Initiation    Medication: tacrolimus (PROTOPIC) 0.1 % ointment  Insurance Company: [employer] - Phone [phone] Fax [phone]  Pharmacy Filling the Rx: [id number] - [place], [place] - [address]  Filling Pharmacy Phone: [phone]  Filling Pharmacy Fax: [phone]  Start Date: 2/7/2018

## 2019-04-02 ENCOUNTER — OFFICE VISIT (OUTPATIENT)
Dept: DERMATOLOGY | Facility: CLINIC | Age: 69
End: 2019-04-02
Payer: MEDICARE

## 2019-04-02 VITALS — SYSTOLIC BLOOD PRESSURE: 162 MMHG | DIASTOLIC BLOOD PRESSURE: 89 MMHG | HEART RATE: 76 BPM

## 2019-04-02 DIAGNOSIS — L66.12 FRONTAL FIBROSING ALOPECIA: Primary | ICD-10-CM

## 2019-04-02 DIAGNOSIS — L82.1 SEBORRHEIC KERATOSIS: ICD-10-CM

## 2019-04-02 ASSESSMENT — PAIN SCALES - GENERAL: PAINLEVEL: NO PAIN (0)

## 2019-04-02 NOTE — PROGRESS NOTES
[facility] Dermatology Note      Dermatology Problem List:  1. Frontal Fibrosing Alopecia - stable. LPPAI is 0.67 which is the same as last visit. Disease is very quiet so we will discontinue Plaquenil    Start Rogaine 5% foam for eyebrows daily.     Discontinue plaquenil     Continue LLLT 3x weekly     Provider filled out form for reimbursement for scalp prosthesis    2. Seborrheic keratosis, non irritated    Discussed benign nature.    No intervention     Encounter Date: Apr 2, 2019    CC:  Chief Complaint   Patient presents with     Derm Problem     Aleja is here for a hairloss follow up, states that things are good.         History of Present Illness:  Ms. Aleja Gudino is a 68 year old female who presents as a follow-up for FFA. The patient was last seen 12/2018. Patient is wearing a wig and has not had a flare. She feel that her hair line is still moving backward. She states that she is ignoring the disease.     Current treatment includes plaquenil 200 mg every other day and laser comb 2x weekly.     No new medical conditions or medications.      Past Medical History:   Patient Active Problem List   Diagnosis     Cicatricial alopecia     Actinic keratosis     Dermatitis     Frontal fibrosing alopecia     Dermatitis, seborrheic     Elevated blood pressure reading     Allergic rhinitis     Breast microcalcifications     Carpal tunnel syndrome     Esophageal reflux     History of bowel resection     HTN (hypertension)     Ingrowing nail     Irritable bowel syndrome     Low back pain radiating to left lower extremity     Obstructive sleep apnea     Osteoarthritis     Other atopic dermatitis and related conditions     Overweight     Pain in joint, shoulder region     Postmenopausal atrophic vaginitis     Primary open angle glaucoma     Encounter for screening for osteoporosis     Thyroid nodule     Type 2 diabetes mellitus without complication, without long-term current use of insulin (H)      Asthma     Essential hypertension     Vitamin D deficiency     History of thyroid nodule     Past Medical History:   Diagnosis Date     Frontal fibrosing alopecia      Glaucoma      Hair loss      Hypertension      Sleep apnea     c-pap     Squamous cell carcinoma      Thyroid nodule      Type 2 diabetes mellitus (H)      Weight loss      Past Surgical History:   Procedure Laterality Date     BIOPSY OF SKIN LESION       CARPAL TUNNEL RELEASE RT/LT        SECTION       CHOLECYSTECTOMY       COLONOSCOPY N/A 3/19/2015    Procedure: COLONOSCOPY;  Surgeon: Ck Childress MD;  Location: [facility]     Excision oral melanotic macule  2013    lower right lip     HC LAP,FULGURATE/EXCISE LESIONS      tumors removed and colon resection     KNEE SURGERY      arthroscopic- left knee     NO HISTORY OF SURGERY  13    derm     ORTHOPEDIC SURGERY      right and left knee replacements     RELEASE TRIGGER FINGER       RESECTION ABDOMINAL PERINEAL         Social History:  Patient reports that  has never smoked. she has never used smokeless tobacco. She reports that she does not drink alcohol or use drugs.    Family History:  Family History   Problem Relation Age of Onset     Cancer No family hx of         No family history of skin cancer     Melanoma No family hx of      Skin Cancer No family hx of        Medications:  Current Outpatient Medications   Medication Sig Dispense Refill     ACCU-CHEK CLARISA PLUS test strip Use to test blood sugar 2 times daily or as directed. 200 strip 3     aspirin 81 MG tablet Take  by mouth daily.       blood glucose monitoring (ACCU-CHEK FASTCLIX) lancets Use 2x daily for blood glucose testing 200 each 3     Cholecalciferol (VITAMIN D) 2000 UNITS tablet Take 1 tablet by mouth daily.       diclofenac sodium 3 % GEL 2 %        finasteride (PROPECIA) 1 MG tablet Take 1 tablet (1 mg) by mouth daily 60 tablet 5     fluticasone (FLONASE) 50 MCG/ACT nasal spray Spray 2 sprays into both  nostrils 2 times daily.       hydroxychloroquine (PLAQUENIL) 200 MG tablet Take 1 tablet (200 mg) by mouth every other day 30 tablet 2     insulin pen needle (BD YURIY U/F) 32G X 4 MM Use 1 pen needles daily or as directed. 50 each 11     latanoprost (XALATAN) 0.005 % ophthalmic solution 1 drop At Bedtime       liraglutide (VICTOZA PEN) 18 MG/3ML solution Take 0.6 to 1.2 mg sq daily, as directed 12 mL 3     LOSARTAN POTASSIUM-HCTZ PO Take 1 tablet by mouth daily 100 mg  3     losartan-hydrochlorothiazide (HYZAAR) 100-12.5 MG tablet TK 1 T PO QD  0     Minoxidil (ROGAINE MENS) 5 % FOAM Use on affected areas of scalp / eyebrows nightly. 60 g 11     omeprazole (PRILOSEC) 20 MG CR capsule Take 20 mg by mouth daily  3     pioglitazone (ACTOS) 15 MG tablet Take 1 tablet (15 mg) by mouth daily 90 tablet 3     piroxicam (FELDENE) 10 MG capsule Take 10 mg by mouth daily.       tacrolimus (PROTOPIC) 0.1 % ointment Use two times daily when flaring. 60 g 0     triamcinolone (KENALOG) 0.1 % cream Apply to affected symptomatic areas every other day 45 g 1     Allergies   Allergen Reactions     Metformin Diarrhea         Review of Systems:  -As per HPI  -Constitutional: Otherwise feeling well today, in usual state of health.  -HEENT: Patient denies nonhealing oral sores.  -Skin: As above in HPI. No additional skin concerns.    Physical exam:  Vitals: /89 (BP Location: Right arm, Patient Position: Chair, Cuff Size: Adult Regular)   Pulse 76   GEN: This is a well developed, well-nourished female in no acute distress, in a pleasant mood.    SKIN: Focused examination of the scalp, face, nails was performed.  - LPPAI score 0.67 (mild perifollicular erythema and scale)  - negative hair pull test  - fine light colored fibers across both eyebrows  - eyelashes are full  - left cheek, < 0.5 cm light brown, stuck on papule, flesh colored   - nails are normal  -No other lesions of concern on areas examined.                              Impression/Plan:  1. Frontal Fibrosing Alopecia - stable. LPPAI is 0.67 which is the same as last visit. Disease is very quiet so we will discontinue Plaquenil    Start Rogaine 5% foam for eyebrows daily.     Discontinue plaquenil     Continue LLLT 3x weekly     Provider filled out form for reimbursement for scalp prosthesis    2. Seborrheic keratosis, non irritated    Discussed benign nature.    No intervention     Follow-up in 6 months, earlier for new or changing lesions.     Staff Involved:  Panchito Garsia MD  Dermatology Research Fellow     Patient was seen and examined with the clinical research fellow.  I agree with the history, review of systems, physical examination, assessments and plan.    Zoie Bush MD  Professor and  Chair  Department of Dermatology  [facility]

## 2019-04-02 NOTE — NURSING NOTE
Dermatology Rooming Note    Aleja Gudino's goals for this visit include:   Chief Complaint   Patient presents with     Derm Problem     Aleja is here for a hairloss follow up, states that things are good.       Sharmila Kang LPN

## 2019-04-02 NOTE — LETTER
4/2/2019       RE: Aleja Gudino  [id number]  [facility] [address]     Dear Colleague,    Thank you for referring your patient, Aleja Gudino, to the [facility] DERMATOLOGY at [facility]. Please see a copy of my visit note below.    [facility] Dermatology Note      Dermatology Problem List:  1. Frontal Fibrosing Alopecia - stable. LPPAI is 0.67 which is the same as last visit. Disease is very quiet so we will discontinue Plaquenil    Start Rogaine 5% foam for eyebrows daily.     Discontinue plaquenil     Continue LLLT 3x weekly     Provider filled out form for reimbursement for scalp prosthesis    2. Seborrheic keratosis, non irritated    Discussed benign nature.    No intervention     Encounter Date: Apr 2, 2019    CC:  Chief Complaint   Patient presents with     Derm Problem     Aleja is here for a hairloss follow up, states that things are good.         History of Present Illness:  Ms. Aleja Gudino is a 68 year old female who presents as a follow-up for FFA. The patient was last seen 12/2018. Patient is wearing a wig and has not had a flare. She feel that her hair line is still moving backward. She states that she is ignoring the disease.     Current treatment includes plaquenil 200 mg every other day and laser comb 2x weekly.     No new medical conditions or medications.      Past Medical History:   Patient Active Problem List   Diagnosis     Cicatricial alopecia     Actinic keratosis     Dermatitis     Frontal fibrosing alopecia     Dermatitis, seborrheic     Elevated blood pressure reading     Allergic rhinitis     Breast microcalcifications     Carpal tunnel syndrome     Esophageal reflux     History of bowel resection     HTN (hypertension)     Ingrowing nail     Irritable bowel syndrome     Low back pain radiating to left lower extremity     Obstructive sleep apnea     Osteoarthritis     Other atopic dermatitis and related conditions      Overweight     Pain in joint, shoulder region     Postmenopausal atrophic vaginitis     Primary open angle glaucoma     Encounter for screening for osteoporosis     Thyroid nodule     Type 2 diabetes mellitus without complication, without long-term current use of insulin (H)     Asthma     Essential hypertension     Vitamin D deficiency     History of thyroid nodule     Past Medical History:   Diagnosis Date     Frontal fibrosing alopecia      Glaucoma      Hair loss      Hypertension      Sleep apnea     c-pap     Squamous cell carcinoma      Thyroid nodule      Type 2 diabetes mellitus (H)      Weight loss      Past Surgical History:   Procedure Laterality Date     BIOPSY OF SKIN LESION       CARPAL TUNNEL RELEASE RT/LT        SECTION       CHOLECYSTECTOMY       COLONOSCOPY N/A 3/19/2015    Procedure: COLONOSCOPY;  Surgeon: Ck Childress MD;  Location: [facility]     Excision oral melanotic macule  2013    lower right lip     HC LAP,FULGURATE/EXCISE LESIONS      tumors removed and colon resection     KNEE SURGERY      arthroscopic- left knee     NO HISTORY OF SURGERY  13    derm     ORTHOPEDIC SURGERY      right and left knee replacements     RELEASE TRIGGER FINGER       RESECTION ABDOMINAL PERINEAL         Social History:  Patient reports that  has never smoked. she has never used smokeless tobacco. She reports that she does not drink alcohol or use drugs.    Family History:  Family History   Problem Relation Age of Onset     Cancer No family hx of         No family history of skin cancer     Melanoma No family hx of      Skin Cancer No family hx of        Medications:  Current Outpatient Medications   Medication Sig Dispense Refill     ACCU-CHEK CLARISA PLUS test strip Use to test blood sugar 2 times daily or as directed. 200 strip 3     aspirin 81 MG tablet Take  by mouth daily.       blood glucose monitoring (ACCU-CHEK FASTCLIX) lancets Use 2x daily for blood glucose testing 200 each 3      Cholecalciferol (VITAMIN D) 2000 UNITS tablet Take 1 tablet by mouth daily.       diclofenac sodium 3 % GEL 2 %        finasteride (PROPECIA) 1 MG tablet Take 1 tablet (1 mg) by mouth daily 60 tablet 5     fluticasone (FLONASE) 50 MCG/ACT nasal spray Spray 2 sprays into both nostrils 2 times daily.       hydroxychloroquine (PLAQUENIL) 200 MG tablet Take 1 tablet (200 mg) by mouth every other day 30 tablet 2     insulin pen needle (BD YURIY U/F) 32G X 4 MM Use 1 pen needles daily or as directed. 50 each 11     latanoprost (XALATAN) 0.005 % ophthalmic solution 1 drop At Bedtime       liraglutide (VICTOZA PEN) 18 MG/3ML solution Take 0.6 to 1.2 mg sq daily, as directed 12 mL 3     LOSARTAN POTASSIUM-HCTZ PO Take 1 tablet by mouth daily 100 mg  3     losartan-hydrochlorothiazide (HYZAAR) 100-12.5 MG tablet TK 1 T PO QD  0     Minoxidil (ROGAINE MENS) 5 % FOAM Use on affected areas of scalp / eyebrows nightly. 60 g 11     omeprazole (PRILOSEC) 20 MG CR capsule Take 20 mg by mouth daily  3     pioglitazone (ACTOS) 15 MG tablet Take 1 tablet (15 mg) by mouth daily 90 tablet 3     piroxicam (FELDENE) 10 MG capsule Take 10 mg by mouth daily.       tacrolimus (PROTOPIC) 0.1 % ointment Use two times daily when flaring. 60 g 0     triamcinolone (KENALOG) 0.1 % cream Apply to affected symptomatic areas every other day 45 g 1     Allergies   Allergen Reactions     Metformin Diarrhea         Review of Systems:  -As per HPI  -Constitutional: Otherwise feeling well today, in usual state of health.  -HEENT: Patient denies nonhealing oral sores.  -Skin: As above in HPI. No additional skin concerns.    Physical exam:  Vitals: /89 (BP Location: Right arm, Patient Position: Chair, Cuff Size: Adult Regular)   Pulse 76   GEN: This is a well developed, well-nourished female in no acute distress, in a pleasant mood.    SKIN: Focused examination of the scalp, face, nails was performed.  - LPPAI score 0.67 (mild perifollicular  erythema and scale)  - negative hair pull test  - fine light colored fibers across both eyebrows  - eyelashes are full  - left cheek, < 0.5 cm light brown, stuck on papule, flesh colored   - nails are normal  -No other lesions of concern on areas examined.                             Impression/Plan:  1. Frontal Fibrosing Alopecia - stable. LPPAI is 0.67 which is the same as last visit. Disease is very quiet so we will discontinue Plaquenil    Start Rogaine 5% foam for eyebrows daily.     Discontinue plaquenil     Continue LLLT 3x weekly     Provider filled out form for reimbursement for scalp prosthesis    2. Seborrheic keratosis, non irritated    Discussed benign nature.    No intervention     Follow-up in 6 months, earlier for new or changing lesions.     Staff Involved:  Panchito Garsia MD  Dermatology Research Fellow     Patient was seen and examined with the clinical research fellow.  I agree with the history, review of systems, physical examination, assessments and plan.    Zoie Bush MD  Professor and  Chair  Department of Dermatology  [facility]                                     Pictures were placed in Pt's chart today for future reference.      Again, thank you for allowing me to participate in the care of your patient.      Sincerely,    Zoie Bush MD

## 2019-04-08 ENCOUNTER — OFFICE VISIT (OUTPATIENT)
Dept: ENDOCRINOLOGY | Facility: CLINIC | Age: 69
End: 2019-04-08
Payer: MEDICARE

## 2019-04-08 VITALS
HEART RATE: 71 BPM | SYSTOLIC BLOOD PRESSURE: 133 MMHG | WEIGHT: 166.6 LBS | HEIGHT: 63 IN | DIASTOLIC BLOOD PRESSURE: 73 MMHG | BODY MASS INDEX: 29.52 KG/M2

## 2019-04-08 DIAGNOSIS — E11.9 TYPE 2 DIABETES MELLITUS WITHOUT COMPLICATION, WITHOUT LONG-TERM CURRENT USE OF INSULIN (H): ICD-10-CM

## 2019-04-08 LAB — HBA1C MFR BLD: 6 % (ref 0–5.6)

## 2019-04-08 PROCEDURE — 99214 OFFICE O/P EST MOD 30 MIN: CPT | Performed by: INTERNAL MEDICINE

## 2019-04-08 PROCEDURE — 36415 COLL VENOUS BLD VENIPUNCTURE: CPT | Performed by: INTERNAL MEDICINE

## 2019-04-08 PROCEDURE — 83036 HEMOGLOBIN GLYCOSYLATED A1C: CPT | Performed by: INTERNAL MEDICINE

## 2019-04-08 RX ORDER — BLOOD SUGAR DIAGNOSTIC
STRIP MISCELLANEOUS
Qty: 200 STRIP | Refills: 3 | Status: SHIPPED | OUTPATIENT
Start: 2019-04-08 | End: 2019-04-15

## 2019-04-08 RX ORDER — LANCETS
EACH MISCELLANEOUS
Qty: 200 EACH | Refills: 3 | Status: SHIPPED | OUTPATIENT
Start: 2019-04-08 | End: 2020-07-08

## 2019-04-08 ASSESSMENT — MIFFLIN-ST. JEOR: SCORE: 1244.82

## 2019-04-08 NOTE — PROGRESS NOTES
Name: Aleja Gudino      HPI:  Recent issues:  Here for f/u diabetes evaluation  Now taking Plaquenil medication 2018 per Dr. Bush for management of alopecia  She wonders if this med has benefit        . Diagnosis of diabetes mellitus  ...Details of initial evaluation not available, body wt near 215#  Initial treatment with diet management  . Began metformin medication, but GI intolerance  Subsequent use of glimeparide, Tradjenta, basal insulin, Victoza, pioglitazone  . Began weight loss plan  Current DM meds:   Victoza 0.6 mg daily   Pioglitazone 15 mg daily  Using Accucheck BG meter, tests QAM   Recent trend avg 105 mg/dl    Recent [facility] labs include:      Recent [facility] labs include:  Lab Results   Component Value Date    A1C 6.2 (H) 2018     2018    POTASSIUM 4.3 2018    CHLORIDE 102 2018    CO2 28 2018    ANIONGAP 8 2018    GLC 92 2018    BUN 26 2018    CR 0.70 2018    GFRESTIMATED 83 2018    GFRESTBLACK >90 2018    HUANG 8.9 2018     Lab Results   Component Value Date    TSH 1.22 2018     Fam Hx diabetes: none  Last eye exam with Dr. Matute 2018, no DR  DM Complications: none known      Sees Dr. Tanmay Arriola/[facility] for general medicine evaluations.  Also sees Dr. Bush/[facility]    PMH/PSH:  Past Medical History:   Diagnosis Date     Frontal fibrosing alopecia      Glaucoma      Hair loss      Hypertension      Sleep apnea     c-pap     Squamous cell carcinoma      Thyroid nodule      Type 2 diabetes mellitus (H)      Weight loss      Past Surgical History:   Procedure Laterality Date     BIOPSY OF SKIN LESION       CARPAL TUNNEL RELEASE RT/LT        SECTION       CHOLECYSTECTOMY       COLONOSCOPY N/A 3/19/2015    Procedure: COLONOSCOPY;  Surgeon: Ck Childress MD;  Location: [facility]     Excision oral melanotic macule  2013    lower right lip     HC  LAP,FULGURATE/EXCISE LESIONS      tumors removed and colon resection     KNEE SURGERY      arthroscopic- left knee     NO HISTORY OF SURGERY  6/18/13    derm     ORTHOPEDIC SURGERY      right and left knee replacements     RELEASE TRIGGER FINGER       RESECTION ABDOMINAL PERINEAL         Family Hx:  Family History   Problem Relation Age of Onset     Cancer No family hx of         No family history of skin cancer     Melanoma No family hx of      Skin Cancer No family hx of          Social Hx:  Social History     Socioeconomic History     Marital status:      Spouse name: Not on file     Number of children: Not on file     Years of education: Not on file     Highest education level: Not on file   Occupational History     Not on file   Social Needs     Financial resource strain: Not on file     Food insecurity:     Worry: Not on file     Inability: Not on file     Transportation needs:     Medical: Not on file     Non-medical: Not on file   Tobacco Use     Smoking status: Never Smoker     Smokeless tobacco: Never Used   Substance and Sexual Activity     Alcohol use: No     Alcohol/week: 0.0 oz     Drug use: No     Sexual activity: Not on file   Lifestyle     Physical activity:     Days per week: Not on file     Minutes per session: Not on file     Stress: Not on file   Relationships     Social connections:     Talks on phone: Not on file     Gets together: Not on file     Attends Buddhism service: Not on file     Active member of club or organization: Not on file     Attends meetings of clubs or organizations: Not on file     Relationship status: Not on file     Intimate partner violence:     Fear of current or ex partner: Not on file     Emotionally abused: Not on file     Physically abused: Not on file     Forced sexual activity: Not on file   Other Topics Concern     Parent/sibling w/ CABG, MI or angioplasty before 65F 55M? Not Asked   Social History Narrative     Not on file          MEDICATIONS:  has a  "current medication list which includes the following prescription(s): accu-chek pedro pablo plus, aspirin, blood glucose monitoring, vitamin d3, diclofenac sodium, fluticasone, insulin pen needle, latanoprost, liraglutide, losartan-hydrochlorothiazide, omeprazole, pioglitazone, piroxicam, and NONFORMULARY.    ROS:     ROS: 10 point ROS neg other than the symptoms noted above in the HPI.    GENERAL: mild fatigue, wt stable; denies fevers, chills, night sweats.   HEENT: no dysphagia, odonophagia, diplopia, neck pain  THYROID:  no apparent hyper or hypothyroid symptoms  CV: no chest pain, pressure, palpitations  LUNGS: no SOB, SIDHU, cough, wheezing   ABDOMEN: no diarrhea, constipation, abdominal pain  EXTREMITIES: no rashes, ulcers, edema  NEUROLOGY: no headaches, denies changes in vision, tingling, extremitiy numbness   MSK: arthralgias of knees; no muscle weakness  SKIN: scalp hair thinning; no rashes or lesions  :  No menses  PSYCH:  stable mood, no significant anxiety or depression  ENDOCRINE: no heat or cold intolerance    Physical Exam   VS: /73   Pulse 71   Ht 1.6 m (5' 3\")   Wt 75.6 kg (166 lb 9.6 oz)   BMI 29.51 kg/m    GENERAL: AXOX3, NAD, well dressed, answering questions appropriately, appears stated age.  THYROID:  normal gland, no apparent nodules or goiter  CV:  RRR without murmurs  LUNGS:  CTA posteriorly  ABDOMEN: mildly obese, soft, nontender, nondistended  EXTREMITIES: no edema  NEUROLOGY: CN grossly intact, no tremors  MSK: grossly intact  SKIN: scalp hair thinning anteriorly; no rashes, no lesions    LABS:    All pertinent notes, labs, and images personally reviewed by me.     A/P:  Encounter Diagnosis   Name Primary?     Type 2 diabetes mellitus without complication, without long-term current use of insulin (H)        Comments:  Reviewed health history and diabetes issues.  Recent FBG levels excellent    Plan:  Discussed general issues with the diabetes diagnosis and management  We discussed " the hgbA1c test which reflects previous overall glucose levels or control  Discussed the importance of blood glucose (BG) testing to assess glucose trends  Provided general overview of the diabetes medication options and medication treatment plan.    Recommend:  Reviewed diabetes medication treatment options  Continue current low dose Victoza and pioglitazone medications at this time, though stop pioglitazone after her 5/2019 [place] vacation  Check FBG daily or every other day, goal target  mg/dl  Check hgbA1c today  Monitor for symptom changes  Keep focus on diet, exercise, weight management.  Advise having fasting lipid panel testing and dilated eye examination, at least annually    Review the hair thinning with dermatologist... Discontinued plaquenil 3/2019  Addressed patient questions today    Labs ordered today:   Orders Placed This Encounter   Procedures     Hemoglobin A1c     Radiology/Consults ordered today: None    More than 50% of the time spent with Mrs. Gudino on counseling / coordinating her care.  Total appointment time was 30 minutes.    Follow-up:  4 mo    Hector Thorne MD  Endocrinology  [facility]

## 2019-04-15 ENCOUNTER — TELEPHONE (OUTPATIENT)
Dept: ENDOCRINOLOGY | Facility: CLINIC | Age: 69
End: 2019-04-15

## 2019-04-15 DIAGNOSIS — E11.9 TYPE 2 DIABETES MELLITUS WITHOUT COMPLICATION, WITHOUT LONG-TERM CURRENT USE OF INSULIN (H): ICD-10-CM

## 2019-04-15 RX ORDER — BLOOD SUGAR DIAGNOSTIC
STRIP MISCELLANEOUS
Qty: 200 STRIP | Refills: 3 | Status: SHIPPED | OUTPATIENT
Start: 2019-04-15 | End: 2019-08-28

## 2019-04-15 NOTE — TELEPHONE ENCOUNTER
"Reason for Call:  Other call back    Detailed comments: ACCU-CHEK CLARISA PLUS test strip   \"As directed\" needs to be taken off this script, insurance won't accept. Please advise.     Phone Number Patient can be reached at: Other phone number:  [phone]    Best Time: any     Can we leave a detailed message on this number? no    Call taken on 4/15/2019 at 10:06 AM by Adalid Napier    "

## 2019-04-16 NOTE — TELEPHONE ENCOUNTER
Message noted, modified glucose test strip Rx sent to pharmacy as requested.    PATT Thorne MD  Endocrinology   [facility]

## 2019-05-08 ENCOUNTER — MYC MEDICAL ADVICE (OUTPATIENT)
Dept: ENDOCRINOLOGY | Facility: CLINIC | Age: 69
End: 2019-05-08

## 2019-05-08 NOTE — TELEPHONE ENCOUNTER
Dr. Thorne see below update - please advise if any further changes to pt's treatment plan     Thank you,   Tiffanie HARKINS RN

## 2019-06-25 ENCOUNTER — TRANSFERRED RECORDS (OUTPATIENT)
Dept: HEALTH INFORMATION MANAGEMENT | Facility: CLINIC | Age: 69
End: 2019-06-25

## 2019-07-29 LAB — MAMMOGRAM: NORMAL

## 2019-08-23 ENCOUNTER — TELEPHONE (OUTPATIENT)
Dept: ENDOCRINOLOGY | Facility: CLINIC | Age: 69
End: 2019-08-23

## 2019-08-23 NOTE — TELEPHONE ENCOUNTER
Reason for call:  Request for clinical documentation to be faxed to [facility] ([phone]) for PA started on 8-7-19 for insulin pump.    Patient called regarding (reason for call): na  Additional comments: Please fax information over and use reference number [id number]     Phone number to reach patient:  Other phone number:  [phone]    Best Time:  any    Can we leave a detailed message on this number?  NO

## 2019-08-28 ENCOUNTER — OFFICE VISIT (OUTPATIENT)
Dept: ENDOCRINOLOGY | Facility: CLINIC | Age: 69
End: 2019-08-28
Payer: MEDICARE

## 2019-08-28 VITALS
BODY MASS INDEX: 29.13 KG/M2 | HEART RATE: 86 BPM | DIASTOLIC BLOOD PRESSURE: 68 MMHG | HEIGHT: 63 IN | SYSTOLIC BLOOD PRESSURE: 126 MMHG | WEIGHT: 164.4 LBS

## 2019-08-28 DIAGNOSIS — E11.9 TYPE 2 DIABETES MELLITUS WITHOUT COMPLICATION, WITHOUT LONG-TERM CURRENT USE OF INSULIN (H): ICD-10-CM

## 2019-08-28 DIAGNOSIS — Z86.39 HISTORY OF THYROID NODULE: Primary | ICD-10-CM

## 2019-08-28 LAB
ANION GAP SERPL CALCULATED.3IONS-SCNC: 9 MMOL/L (ref 3–14)
BUN SERPL-MCNC: 17 MG/DL (ref 7–30)
CALCIUM SERPL-MCNC: 9 MG/DL (ref 8.5–10.1)
CHLORIDE SERPL-SCNC: 105 MMOL/L (ref 94–109)
CO2 SERPL-SCNC: 28 MMOL/L (ref 20–32)
CREAT SERPL-MCNC: 0.73 MG/DL (ref 0.52–1.04)
CREAT UR-MCNC: 24 MG/DL
GFR SERPL CREATININE-BSD FRML MDRD: 82 ML/MIN/{1.73_M2}
GLUCOSE SERPL-MCNC: 119 MG/DL (ref 70–99)
HBA1C MFR BLD: 6.6 % (ref 0–5.6)
MICROALBUMIN UR-MCNC: 9 MG/L
MICROALBUMIN/CREAT UR: 36.07 MG/G CR (ref 0–25)
POTASSIUM SERPL-SCNC: 3.7 MMOL/L (ref 3.4–5.3)
SODIUM SERPL-SCNC: 142 MMOL/L (ref 133–144)
TSH SERPL DL<=0.005 MIU/L-ACNC: 1.64 MU/L (ref 0.4–4)

## 2019-08-28 PROCEDURE — 99214 OFFICE O/P EST MOD 30 MIN: CPT | Performed by: INTERNAL MEDICINE

## 2019-08-28 PROCEDURE — 36415 COLL VENOUS BLD VENIPUNCTURE: CPT | Performed by: INTERNAL MEDICINE

## 2019-08-28 PROCEDURE — 82043 UR ALBUMIN QUANTITATIVE: CPT | Performed by: INTERNAL MEDICINE

## 2019-08-28 PROCEDURE — 80048 BASIC METABOLIC PNL TOTAL CA: CPT | Performed by: INTERNAL MEDICINE

## 2019-08-28 PROCEDURE — 84443 ASSAY THYROID STIM HORMONE: CPT | Performed by: INTERNAL MEDICINE

## 2019-08-28 PROCEDURE — 83036 HEMOGLOBIN GLYCOSYLATED A1C: CPT | Performed by: INTERNAL MEDICINE

## 2019-08-28 RX ORDER — BLOOD SUGAR DIAGNOSTIC
STRIP MISCELLANEOUS
Qty: 100 STRIP | Refills: 3 | Status: SHIPPED | OUTPATIENT
Start: 2019-08-28 | End: 2020-07-08

## 2019-08-28 ASSESSMENT — MIFFLIN-ST. JEOR: SCORE: 1234.84

## 2019-08-28 NOTE — PROGRESS NOTES
Name: Aleja Gudino      HPI:  Recent issues:  Here for f/u diabetes evaluation  Had 2 finger steroid injections with Dr. Levin 19, finger movement better  Moved to condo in [place]        . Diagnosis of diabetes mellitus  ...Details of initial evaluation not available, body wt near 215#  Initial treatment with diet management  . Began metformin medication, but GI intolerance  Subsequent use of glimeparide, Tradjenta, basal insulin, Victoza, pioglitazone  . Began weight loss plan  2019. Discontinued pioglitazone, per plan  Current DM meds:   Victoza 0.6 mg daily    Using Accucheck BG meter, tests QAM   Recent trend 97- 132, avg 115 mg/dl    Recent [facility] labs include:      Recent [facility] labs include:  Lab Results   Component Value Date    A1C 6.0 (H) 2019     2018    POTASSIUM 4.3 2018    CHLORIDE 102 2018    CO2 28 2018    ANIONGAP 8 2018    GLC 92 2018    BUN 26 2018    CR 0.70 2018    GFRESTIMATED 83 2018    GFRESTBLACK >90 2018    HUANG 8.9 2018     Lab Results   Component Value Date    TSH 1.22 2018     Last eye exam with Dr. Matute 2019, no DR  DM Complications: none known      Sees Dr. Tanmay Arriola/[facility] for general medicine evaluations.  Also sees Dr. Bush/[facility]    PMH/PSH:  Past Medical History:   Diagnosis Date     Frontal fibrosing alopecia      Glaucoma      Hair loss      Hypertension      Sleep apnea     c-pap     Squamous cell carcinoma      Thyroid nodule      Type 2 diabetes mellitus (H)      Weight loss      Past Surgical History:   Procedure Laterality Date     BIOPSY OF SKIN LESION       CARPAL TUNNEL RELEASE RT/LT        SECTION       CHOLECYSTECTOMY       COLONOSCOPY N/A 3/19/2015    Procedure: COLONOSCOPY;  Surgeon: Ck Childress MD;  Location:  GI     Excision oral melanotic macule  2013    lower right lip     HC  LAP,FULGURATE/EXCISE LESIONS      tumors removed and colon resection     KNEE SURGERY      arthroscopic- left knee     NO HISTORY OF SURGERY  6/18/13    derm     ORTHOPEDIC SURGERY      right and left knee replacements     RELEASE TRIGGER FINGER       RESECTION ABDOMINAL PERINEAL         Family Hx:  Family History   Problem Relation Age of Onset     Cancer No family hx of         No family history of skin cancer     Melanoma No family hx of      Skin Cancer No family hx of          Social Hx:  Social History     Socioeconomic History     Marital status:      Spouse name: Not on file     Number of children: Not on file     Years of education: Not on file     Highest education level: Not on file   Occupational History     Not on file   Social Needs     Financial resource strain: Not on file     Food insecurity:     Worry: Not on file     Inability: Not on file     Transportation needs:     Medical: Not on file     Non-medical: Not on file   Tobacco Use     Smoking status: Never Smoker     Smokeless tobacco: Never Used   Substance and Sexual Activity     Alcohol use: No     Alcohol/week: 0.0 oz     Drug use: No     Sexual activity: Not on file   Lifestyle     Physical activity:     Days per week: Not on file     Minutes per session: Not on file     Stress: Not on file   Relationships     Social connections:     Talks on phone: Not on file     Gets together: Not on file     Attends Religion service: Not on file     Active member of club or organization: Not on file     Attends meetings of clubs or organizations: Not on file     Relationship status: Not on file     Intimate partner violence:     Fear of current or ex partner: Not on file     Emotionally abused: Not on file     Physically abused: Not on file     Forced sexual activity: Not on file   Other Topics Concern     Parent/sibling w/ CABG, MI or angioplasty before 65F 55M? Not Asked   Social History Narrative     Not on file          MEDICATIONS:  has a  "current medication list which includes the following prescription(s): accu-chek pedro pablo plus, aspirin, blood glucose monitoring, vitamin d3, diclofenac sodium, fluticasone, insulin pen needle, latanoprost, liraglutide, losartan-hydrochlorothiazide, NONFORMULARY, omeprazole, pioglitazone, and piroxicam.    ROS:     ROS: 10 point ROS neg other than the symptoms noted above in the HPI.    GENERAL: mild fatigue, wt stable; denies fevers, chills, night sweats.   HEENT: no dysphagia, odonophagia, diplopia, neck pain  THYROID:  no apparent hyper or hypothyroid symptoms  CV: no chest pain, pressure, palpitations  LUNGS: no SOB, SIDHU, cough, wheezing   ABDOMEN: no diarrhea, constipation, abdominal pain  EXTREMITIES: no rashes, ulcers, edema  NEUROLOGY: no headaches, denies changes in vision, tingling, extremitiy numbness   MSK: arthralgias of knees; no muscle weakness  SKIN: scalp hair thinning; no rashes or lesions  :  No menses  PSYCH:  stable mood, no significant anxiety or depression  ENDOCRINE: no heat or cold intolerance    Physical Exam   VS: /68   Pulse 86   Ht 1.6 m (5' 3\")   Wt 74.6 kg (164 lb 6.4 oz)   BMI 29.12 kg/m    GENERAL: AXOX3, NAD, well dressed, answering questions appropriately, appears stated age.  THYROID:  normal gland, no apparent nodules or goiter  ABDOMEN: mildly obese, soft, nontender, nondistended  EXTREMITIES: no edema, +pulses, tiny callous vs wart at middle plantar surface both feet  NEUROLOGY: CN grossly intact, no tremors  MSK: grossly intact  SKIN: scalp hair thinning anteriorly; no rashes, no lesions    LABS:    All pertinent notes, labs, and images personally reviewed by me.     A/P:  Encounter Diagnoses   Name Primary?     Type 2 diabetes mellitus without complication, without long-term current use of insulin (H)      History of thyroid nodule Yes       Comments:  Reviewed health history and diabetes issues.  Recent FBG levels excellent    Plan:  Discussed general issues with " the diabetes diagnosis and management  We discussed the hgbA1c test which reflects previous overall glucose levels or control  Discussed the importance of blood glucose (BG) testing to assess glucose trends  Provided general overview of the diabetes medication options and medication treatment plan.    Recommend:  Reviewed diabetes medication treatment options  Continue current low dose Victoza at this time  Check FBG daily or every other day, goal target  mg/dl  Check labs today  Monitor for symptom changes  Will request copy of last eye exam from Dr. Matute's office  Keep focus on diet, exercise, weight management.  Advise having fasting lipid panel testing and dilated eye examination, at least annually    Review the hair thinning with dermatologist  Addressed patient questions today    Labs ordered today:   Orders Placed This Encounter   Procedures     Hemoglobin A1c     Basic metabolic panel     Albumin Random Urine Quantitative with Creat Ratio     TSH     Radiology/Consults ordered today: None    More than 50% of the time spent with Mrs. Gudino on counseling / coordinating her care.  Total appointment time was 30 minutes.    Follow-up:  1/8/20    CHANCE Thorne MD, MS  [facility]

## 2019-08-29 NOTE — TELEPHONE ENCOUNTER
Message received, but it was attached to wrong patient.  This patient is not a pump candidate.  I have messaged the MA sender about this.    CHANCE Thorne MD, [facility] Endocrinology

## 2019-09-29 ENCOUNTER — HEALTH MAINTENANCE LETTER (OUTPATIENT)
Age: 69
End: 2019-09-29

## 2019-12-27 ENCOUNTER — DOCUMENTATION ONLY (OUTPATIENT)
Dept: CARE COORDINATION | Facility: CLINIC | Age: 69
End: 2019-12-27

## 2020-01-08 ENCOUNTER — OFFICE VISIT (OUTPATIENT)
Dept: ENDOCRINOLOGY | Facility: CLINIC | Age: 70
End: 2020-01-08
Payer: MEDICARE

## 2020-01-08 VITALS
DIASTOLIC BLOOD PRESSURE: 68 MMHG | SYSTOLIC BLOOD PRESSURE: 126 MMHG | WEIGHT: 170 LBS | HEIGHT: 63 IN | BODY MASS INDEX: 30.12 KG/M2 | HEART RATE: 76 BPM

## 2020-01-08 DIAGNOSIS — Z86.39 HISTORY OF THYROID NODULE: ICD-10-CM

## 2020-01-08 DIAGNOSIS — E11.9 TYPE 2 DIABETES MELLITUS WITHOUT COMPLICATION, WITHOUT LONG-TERM CURRENT USE OF INSULIN (H): Primary | ICD-10-CM

## 2020-01-08 LAB
ALT SERPL W P-5'-P-CCNC: 16 U/L (ref 0–50)
CHOLEST SERPL-MCNC: 190 MG/DL
HBA1C MFR BLD: 6.9 % (ref 0–5.6)
HDLC SERPL-MCNC: 75 MG/DL
LDLC SERPL CALC-MCNC: 101 MG/DL
NONHDLC SERPL-MCNC: 115 MG/DL
TRIGL SERPL-MCNC: 68 MG/DL

## 2020-01-08 PROCEDURE — 99213 OFFICE O/P EST LOW 20 MIN: CPT | Performed by: INTERNAL MEDICINE

## 2020-01-08 PROCEDURE — 80061 LIPID PANEL: CPT | Performed by: INTERNAL MEDICINE

## 2020-01-08 PROCEDURE — 84460 ALANINE AMINO (ALT) (SGPT): CPT | Performed by: INTERNAL MEDICINE

## 2020-01-08 PROCEDURE — 83036 HEMOGLOBIN GLYCOSYLATED A1C: CPT | Performed by: INTERNAL MEDICINE

## 2020-01-08 PROCEDURE — 36415 COLL VENOUS BLD VENIPUNCTURE: CPT | Performed by: INTERNAL MEDICINE

## 2020-01-08 ASSESSMENT — MIFFLIN-ST. JEOR: SCORE: 1255.24

## 2020-01-08 NOTE — PROGRESS NOTES
Name: Aleja Gudino      HPI:  Recent issues:  Here for f/u diabetes evaluation  Feeling well overall, no new health issues.          . Diagnosis of diabetes mellitus  ...Details of initial evaluation not available, body wt near 215#  Initial treatment with diet management  . Began metformin medication, but GI intolerance  Subsequent use of glimeparide, Tradjenta, basal insulin, Victoza, pioglitazone  . Began weight loss plan  2019. Discontinued pioglitazone, per plan  Current DM meds:   Victoza 0.6 mg + 2-clicks    Using Accucheck BG meter, tests QAM   Recent trends am 123-157, pm 119-146 mg/dl    Recent [facility] labs include:      Recent [facility] labs include:  Lab Results   Component Value Date    A1C 6.6 (H) 2019     2019    POTASSIUM 3.7 2019    CHLORIDE 105 2019    CO2 28 2019    ANIONGAP 9 2019     (H) 2019    BUN 17 2019    CR 0.73 2019    GFRESTIMATED 82 2019    GFRESTBLACK >90 2019    HUANG 9.0 2019    UCRR 24 2019    MICROL 9 2019    UMALCR 36.07 (H) 2019     Lab Results   Component Value Date    TSH 1.64 2019     Last eye exam with Dr. Matute 2019, no DR  DM Complications: none known      L[facility] in [facility]  Sees Dr. Tanmay Arriola/[facility] for general medicine evaluations.  Also sees Dr. Bush/[facility]    PMH/PSH:  Past Medical History:   Diagnosis Date     Frontal fibrosing alopecia      Glaucoma      Hair loss      Hypertension      Sleep apnea     c-pap     Squamous cell carcinoma      Thyroid nodule      Type 2 diabetes mellitus (H)      Weight loss      Past Surgical History:   Procedure Laterality Date     BIOPSY OF SKIN LESION       CARPAL TUNNEL RELEASE RT/LT        SECTION       CHOLECYSTECTOMY       COLONOSCOPY N/A 3/19/2015    Procedure: COLONOSCOPY;  Surgeon: Ck Childress MD;  Location: [facility]     Excision oral melanotic macule   7/5/2013    lower right lip     HC LAP,FULGURATE/EXCISE LESIONS      tumors removed and colon resection     KNEE SURGERY      arthroscopic- left knee     NO HISTORY OF SURGERY  6/18/13    derm     ORTHOPEDIC SURGERY      right and left knee replacements     RELEASE TRIGGER FINGER       RESECTION ABDOMINAL PERINEAL         Family Hx:  Family History   Problem Relation Age of Onset     Cancer No family hx of         No family history of skin cancer     Melanoma No family hx of      Skin Cancer No family hx of          Social Hx:  Social History     Socioeconomic History     Marital status:      Spouse name: Not on file     Number of children: Not on file     Years of education: Not on file     Highest education level: Not on file   Occupational History     Not on file   Social Needs     Financial resource strain: Not on file     Food insecurity:     Worry: Not on file     Inability: Not on file     Transportation needs:     Medical: Not on file     Non-medical: Not on file   Tobacco Use     Smoking status: Never Smoker     Smokeless tobacco: Never Used   Substance and Sexual Activity     Alcohol use: No     Alcohol/week: 0.0 standard drinks     Drug use: No     Sexual activity: Not on file   Lifestyle     Physical activity:     Days per week: Not on file     Minutes per session: Not on file     Stress: Not on file   Relationships     Social connections:     Talks on phone: Not on file     Gets together: Not on file     Attends Cheondoism service: Not on file     Active member of club or organization: Not on file     Attends meetings of clubs or organizations: Not on file     Relationship status: Not on file     Intimate partner violence:     Fear of current or ex partner: Not on file     Emotionally abused: Not on file     Physically abused: Not on file     Forced sexual activity: Not on file   Other Topics Concern     Parent/sibling w/ CABG, MI or angioplasty before 65F 55M? Not Asked   Social History Narrative  "    Not on file          MEDICATIONS:  has a current medication list which includes the following prescription(s): aspirin, vitamin d3, diclofenac sodium, fluticasone, latanoprost, liraglutide, losartan-hydrochlorothiazide, omeprazole, piroxicam, accu-chek pedro pablo plus, blood glucose monitoring, insulin pen needle, NONFORMULARY, and pioglitazone.    ROS:     ROS: 10 point ROS neg other than the symptoms noted above in the HPI.    GENERAL: mild fatigue and wt gain; denies fevers, chills, night sweats.   HEENT: no dysphagia, odonophagia, diplopia, neck pain  THYROID:  no apparent hyper or hypothyroid symptoms  CV: no chest pain, pressure, palpitations  LUNGS: no SOB, SIDHU, cough, wheezing   ABDOMEN: no diarrhea, constipation, abdominal pain  EXTREMITIES: no rashes, ulcers, edema  NEUROLOGY: no headaches, denies changes in vision, tingling, extremitiy numbness   MSK: pains left 1st & 2nd fingers, arthralgias of knees; no muscle weakness  SKIN: scalp hair thinning; no rashes or lesions  :  No menses  PSYCH:  stable mood, no significant anxiety or depression  ENDOCRINE: no heat or cold intolerance    Physical Exam   VS: /68   Pulse 76   Ht 1.6 m (5' 3\")   Wt 77.1 kg (170 lb)   BMI 30.11 kg/m    GENERAL: AXOX3, NAD, well dressed, answering questions appropriately, appears stated age.  THYROID:  normal gland, no apparent nodules or goiter  ABDOMEN: mildly obese, soft, nontender, nondistended  EXTREMITIES: no pedal edema  NEUROLOGY: CN grossly intact, no tremors  MSK: grossly intact  SKIN: scalp hair thinning anteriorly, skin bruising; no rashes    LABS:    All pertinent notes, labs, and images personally reviewed by me.     A/P:  Encounter Diagnoses   Name Primary?     Type 2 diabetes mellitus without complication, without long-term current use of insulin (H) Yes     History of thyroid nodule        Comments:  Reviewed health history and diabetes issues.  Recent FBG levels good overall    Plan:  Discussed general " issues with the diabetes diagnosis and management  We discussed the hgbA1c test which reflects previous overall glucose levels or control  Discussed the importance of blood glucose (BG) testing to assess glucose trends  Provided general overview of the diabetes medication options and medication treatment plan.    Recommend:  Continue Victoza, but slight dose increase as 0.6 mg + 5-clicks (0.9 mg) daily  Would not restart pioglitazone at this time  Check FBG daily or every other day, goal target  mg/dl  Check fasting labs today  Monitor for symptom changes  Keep focus on diet, exercise, weight management.  Advise having fasting lipid panel testing and dilated eye examination, at least annually    Review the hair thinning with dermatologist  Addressed patient questions today    Labs ordered today:   Orders Placed This Encounter   Procedures     Hemoglobin A1c     Lipid panel reflex to direct LDL Fasting     ALT     Radiology/Consults ordered today: None    More than 50% of the time spent with Mrs. Gudino on counseling / coordinating her care.  Total appointment time was 20 minutes.    Follow-up:  6/2020    CHANCE Thorne MD, MS  Endocrinology  [facility]

## 2020-01-09 ENCOUNTER — TRANSFERRED RECORDS (OUTPATIENT)
Dept: HEALTH INFORMATION MANAGEMENT | Facility: CLINIC | Age: 70
End: 2020-01-09

## 2020-01-09 LAB — RETINOPATHY: NEGATIVE

## 2020-02-10 ENCOUNTER — OFFICE VISIT (OUTPATIENT)
Dept: DERMATOLOGY | Facility: CLINIC | Age: 70
End: 2020-02-10
Payer: MEDICARE

## 2020-02-10 VITALS — SYSTOLIC BLOOD PRESSURE: 138 MMHG | DIASTOLIC BLOOD PRESSURE: 74 MMHG

## 2020-02-10 DIAGNOSIS — L66.12 FRONTAL FIBROSING ALOPECIA: Primary | ICD-10-CM

## 2020-02-10 ASSESSMENT — PAIN SCALES - GENERAL: PAINLEVEL: NO PAIN (0)

## 2020-02-10 NOTE — PROGRESS NOTES
[facility] Dermatology Note      Dermatology Problem List:  1. Frontal Fibrosing Alopecia - patient would like to pursue symptomatic management as needed only, no desire to attain hair regrowth at this time  -current tx: scalp prosthesis  -previous tx: plaquenil, Rogaine 5% foam, LLLT    2. History of Seborrheic keratosis    Encounter Date: Feb 10, 2020    CC:  Chief Complaint   Patient presents with     Hair/Scalp Problem     aleja is here today for Alopecia. Aleja notes more hair loss, decreased irritation and itching on her scalp.         History of Present Illness:  Ms. Aleja Gudino is a 69 year old female who presents as a follow-up for FFA. The patient was last seen 4/2/1029 when she started Rogaine and discontinued plaquenil. Today she reports over the past 6 months her scalp has not been inflamed or itched. She reports her gray hairs have fallen out over the past 6 months while her darker hairs have remained. She reports her frontal hairline has moved back.     She has not been treating her hair loss since last April. Patient is okay with not treating her hair loss. Patient would like to treat symptoms, such as burn or itch, if they appear in the future. She denies burning, itching, or scale since her last appointment. Patient does have a wig.    Patient has Medicare, BCBS secondarily, and a Health Care Savings Plan.  Her wigs are covered through her Health care savings plan.    No other concerns.     Past Medical History:   Patient Active Problem List   Diagnosis     Cicatricial alopecia     Actinic keratosis     Dermatitis     Frontal fibrosing alopecia     Dermatitis, seborrheic     Elevated blood pressure reading     Allergic rhinitis     Breast microcalcifications     Carpal tunnel syndrome     Esophageal reflux     History of bowel resection     HTN (hypertension)     Ingrowing nail     Irritable bowel syndrome     Low back pain radiating to left lower extremity     Obstructive  sleep apnea     Osteoarthritis     Other atopic dermatitis and related conditions     Overweight     Pain in joint, shoulder region     Postmenopausal atrophic vaginitis     Primary open angle glaucoma     Encounter for screening for osteoporosis     Thyroid nodule     Type 2 diabetes mellitus without complication, without long-term current use of insulin (H)     Asthma     Essential hypertension     Vitamin D deficiency     History of thyroid nodule     Past Medical History:   Diagnosis Date     Frontal fibrosing alopecia      Glaucoma      Hair loss      Hypertension      Sleep apnea     c-pap     Squamous cell carcinoma      Thyroid nodule      Type 2 diabetes mellitus (H)      Weight loss      Past Surgical History:   Procedure Laterality Date     BIOPSY OF SKIN LESION       CARPAL TUNNEL RELEASE RT/LT        SECTION       CHOLECYSTECTOMY       COLONOSCOPY N/A 3/19/2015    Procedure: COLONOSCOPY;  Surgeon: Ck Childress MD;  Location: [facility]     Excision oral melanotic macule  2013    lower right lip     HC LAP,FULGURATE/EXCISE LESIONS      tumors removed and colon resection     KNEE SURGERY      arthroscopic- left knee     NO HISTORY OF SURGERY  13    derm     ORTHOPEDIC SURGERY      right and left knee replacements     RELEASE TRIGGER FINGER       RESECTION ABDOMINAL PERINEAL         Social History:  Patient reports that she has never smoked. She has never used smokeless tobacco. She reports that she does not drink alcohol or use drugs.    Family History:  Family History   Problem Relation Age of Onset     Cancer No family hx of         No family history of skin cancer     Melanoma No family hx of      Skin Cancer No family hx of        Medications:  Current Outpatient Medications   Medication Sig Dispense Refill     ACCU-CHEK CLARISA PLUS test strip Use to test blood sugar 1 times daily 100 strip 3     aspirin 81 MG tablet Take  by mouth daily.       blood glucose monitoring (ACCU-CHEK  FASTCLIX) lancets Use 2x daily for blood glucose testing 200 each 3     Cholecalciferol (VITAMIN D) 2000 UNITS tablet Take 1 tablet by mouth daily.       diclofenac sodium 3 % GEL 2 %        fluticasone (FLONASE) 50 MCG/ACT nasal spray Spray 2 sprays into both nostrils 2 times daily.       insulin pen needle (BD YURIY U/F) 32G X 4 MM miscellaneous Use 1 pen needles daily or as directed. 50 each 11     latanoprost (XALATAN) 0.005 % ophthalmic solution 1 drop At Bedtime       liraglutide (VICTOZA PEN) 18 MG/3ML solution INJECT 0.6 MG TO 1.2 MG    SUBCUTANEOUSLY DAILY AS    DIRECTED (Patient not taking: Reported on 2/10/2020) 18 mL 3     losartan-hydrochlorothiazide (HYZAAR) 100-12.5 MG tablet TK 1 T PO QD  0     NONFORMULARY One scalp prosthesis (Patient not taking: Reported on 2/10/2020) 1 each 0     omeprazole (PRILOSEC) 20 MG CR capsule Take 20 mg by mouth daily  3     pioglitazone (ACTOS) 15 MG tablet Take 1 tablet (15 mg) by mouth daily (Patient not taking: Reported on 1/8/2020) 90 tablet 3     piroxicam (FELDENE) 10 MG capsule Take 10 mg by mouth daily.       Allergies   Allergen Reactions     Metformin Diarrhea         Review of Systems:  -Constitutional: Otherwise feeling well today, in usual state of health.  -Skin: As above in HPI. No additional skin concerns.    Physical exam:  Vitals: /74   GEN: This is a well developed, well-nourished female in no acute distress, in a pleasant mood.    SKIN: Focused examination of the scalp and face was performed.  -LPPAI is 0.67 (mild perifollicular erythema and perifollicular scale)  -very mild perifollicular scale  -mild perifollicular erythema   -Hair density of L temporal scalp, R temporal scalp, and vertex stable compared to previous photographs  -mild flesh-colored papules, bilateral cheeks  -eyelashes of normal density  -medially few vellus fibers R eyebrow and L eyebrow  -No other lesions of concern on areas examined.         Impression/Plan:  1. Frontal  Fibrosing Alopecia - LPPAI is 0.67 (mild perifollicular erythema and perifollicular scale) - Patient to return if symptoms such as redness, burning, or itching occur. PCP to fill out yearly paperwork for scalp prosthetic coverage.    Provider filled out form for reimbursement for scalp prosthesis.    Patient has held all hair loss treatment last April at her own discretion.       Follow-up PRN for new or changing lesions.     Staff Involved:  Scribe/Resident/Staff    Ruth Titus MD  PGY-3 Medicine-Dermatology  Pager [phone]      Scribe Disclosure  I, Alejandro Chaney, am serving as a scribe to document services personally performed by Dr. Zoie Bush MD, based on data collection and the provider's statements to me.    Provider Disclosure:   The documentation recorded by the scribe accurately reflects the services I personally performed and the decisions made by me. Ms. Gudino was also seen with the med derm resident, Dr. Titus.    Zoie Bush MD  Professor and Chair  Department of Dermatology  [facility]: Phone: [phone], Fax:[phone]  [facility]: Phone: [phone], Fax: [phone]

## 2020-02-10 NOTE — NURSING NOTE
Chief Complaint   Patient presents with     Hair/Scalp Problem     aleja is here today for Alopecia. Aleja notes more hair loss, decreased irritation and itching on her scalp.     Denisse Tran LPN

## 2020-02-10 NOTE — LETTER
2/10/2020       RE: Aleja Gudino  [address]  [place] [facility] [facility] [address]     Dear Colleague,    Thank you for referring your patient, Aleja Gudino, to the [facility] DERMATOLOGY at [facility]. Please see a copy of my visit note below.    [facility] Dermatology Note      Dermatology Problem List:  1. Frontal Fibrosing Alopecia - patient would like to pursue symptomatic management as needed only, no desire to attain hair regrowth at this time  -current tx: scalp prosthesis  -previous tx: plaquenil, Rogaine 5% foam, LLLT    2. History of Seborrheic keratosis    Encounter Date: Feb 10, 2020    CC:  Chief Complaint   Patient presents with     Hair/Scalp Problem     aleja is here today for Alopecia. Aleja notes more hair loss, decreased irritation and itching on her scalp.         History of Present Illness:  Ms. Aleja Gudino is a 69 year old female who presents as a follow-up for FFA. The patient was last seen 4/2/1029 when she started Rogaine and discontinued plaquenil. Today she reports over the past 6 months her scalp has not been inflamed or itched. She reports her gray hairs have fallen out over the past 6 months while her darker hairs have remained. She reports her frontal hairline has moved back.     She has not been treating her hair loss since last April. Patient is okay with not treating her hair loss. Patient would like to treat symptoms, such as burn or itch, if they appear in the future. She denies burning, itching, or scale since her last appointment. Patient does have a wig.    Patient has Medicare, BCBS secondarily, and a Health Care Savings Plan.  Her wigs are covered through her Health care savings plan.    No other concerns.     Past Medical History:   Patient Active Problem List   Diagnosis     Cicatricial alopecia     Actinic keratosis     Dermatitis     Frontal fibrosing alopecia     Dermatitis, seborrheic     Elevated blood pressure  reading     Allergic rhinitis     Breast microcalcifications     Carpal tunnel syndrome     Esophageal reflux     History of bowel resection     HTN (hypertension)     Ingrowing nail     Irritable bowel syndrome     Low back pain radiating to left lower extremity     Obstructive sleep apnea     Osteoarthritis     Other atopic dermatitis and related conditions     Overweight     Pain in joint, shoulder region     Postmenopausal atrophic vaginitis     Primary open angle glaucoma     Encounter for screening for osteoporosis     Thyroid nodule     Type 2 diabetes mellitus without complication, without long-term current use of insulin (H)     Asthma     Essential hypertension     Vitamin D deficiency     History of thyroid nodule     Past Medical History:   Diagnosis Date     Frontal fibrosing alopecia      Glaucoma      Hair loss      Hypertension      Sleep apnea     c-pap     Squamous cell carcinoma      Thyroid nodule      Type 2 diabetes mellitus (H)      Weight loss      Past Surgical History:   Procedure Laterality Date     BIOPSY OF SKIN LESION       CARPAL TUNNEL RELEASE RT/LT        SECTION       CHOLECYSTECTOMY       COLONOSCOPY N/A 3/19/2015    Procedure: COLONOSCOPY;  Surgeon: Ck Childress MD;  Location: [facility]     Excision oral melanotic macule  2013    lower right lip     HC LAP,FULGURATE/EXCISE LESIONS      tumors removed and colon resection     KNEE SURGERY      arthroscopic- left knee     NO HISTORY OF SURGERY  13    derm     ORTHOPEDIC SURGERY      right and left knee replacements     RELEASE TRIGGER FINGER       RESECTION ABDOMINAL PERINEAL         Social History:  Patient reports that she has never smoked. She has never used smokeless tobacco. She reports that she does not drink alcohol or use drugs.    Family History:  Family History   Problem Relation Age of Onset     Cancer No family hx of         No family history of skin cancer     Melanoma No family hx of      Skin  Cancer No family hx of        Medications:  Current Outpatient Medications   Medication Sig Dispense Refill     ACCU-CHEK CLARISA PLUS test strip Use to test blood sugar 1 times daily 100 strip 3     aspirin 81 MG tablet Take  by mouth daily.       blood glucose monitoring (ACCU-CHEK FASTCLIX) lancets Use 2x daily for blood glucose testing 200 each 3     Cholecalciferol (VITAMIN D) 2000 UNITS tablet Take 1 tablet by mouth daily.       diclofenac sodium 3 % GEL 2 %        fluticasone (FLONASE) 50 MCG/ACT nasal spray Spray 2 sprays into both nostrils 2 times daily.       insulin pen needle (BD YURIY U/F) 32G X 4 MM miscellaneous Use 1 pen needles daily or as directed. 50 each 11     latanoprost (XALATAN) 0.005 % ophthalmic solution 1 drop At Bedtime       liraglutide (VICTOZA PEN) 18 MG/3ML solution INJECT 0.6 MG TO 1.2 MG    SUBCUTANEOUSLY DAILY AS    DIRECTED (Patient not taking: Reported on 2/10/2020) 18 mL 3     losartan-hydrochlorothiazide (HYZAAR) 100-12.5 MG tablet TK 1 T PO QD  0     NONFORMULARY One scalp prosthesis (Patient not taking: Reported on 2/10/2020) 1 each 0     omeprazole (PRILOSEC) 20 MG CR capsule Take 20 mg by mouth daily  3     pioglitazone (ACTOS) 15 MG tablet Take 1 tablet (15 mg) by mouth daily (Patient not taking: Reported on 1/8/2020) 90 tablet 3     piroxicam (FELDENE) 10 MG capsule Take 10 mg by mouth daily.       Allergies   Allergen Reactions     Metformin Diarrhea         Review of Systems:  -Constitutional: Otherwise feeling well today, in usual state of health.  -Skin: As above in HPI. No additional skin concerns.    Physical exam:  Vitals: /74   GEN: This is a well developed, well-nourished female in no acute distress, in a pleasant mood.    SKIN: Focused examination of the scalp and face was performed.  -LPPAI is 0.67 (mild perifollicular erythema and perifollicular scale)  -very mild perifollicular scale  -mild perifollicular erythema   -Hair density of L temporal scalp, R  temporal scalp, and vertex stable compared to previous photographs  -mild flesh-colored papules, bilateral cheeks  -eyelashes of normal density  -medially few vellus fibers R eyebrow and L eyebrow  -No other lesions of concern on areas examined.         Impression/Plan:  1. Frontal Fibrosing Alopecia - LPPAI is 0.67 (mild perifollicular erythema and perifollicular scale) - Patient to return if symptoms such as redness, burning, or itching occur. PCP to fill out yearly paperwork for scalp prosthetic coverage.    Provider filled out form for reimbursement for scalp prosthesis.    Patient has held all hair loss treatment last April at her own discretion.       Follow-up PRN for new or changing lesions.     Staff Involved:  Scribe/Resident/Staff    Ruth Titus MD  PGY-3 Medicine-Dermatology  Pager [phone]      Scribe Disclosure  I, Alejandro Ritu, am serving as a scribe to document services personally performed by Dr. Zoie Bush MD, based on data collection and the provider's statements to me.    Provider Disclosure:   The documentation recorded by the scribe accurately reflects the services I personally performed and the decisions made by me. Ms. Gudino was also seen with the med derm resident, Dr. Titus.    Zoie Bush MD  Professor and Chair  Department of Dermatology  [facility]: Phone: [phone], Fax:[phone]  [facility]: Phone: [phone], Fax: [phone]

## 2020-03-15 ENCOUNTER — HEALTH MAINTENANCE LETTER (OUTPATIENT)
Age: 70
End: 2020-03-15

## 2020-07-08 ENCOUNTER — VIRTUAL VISIT (OUTPATIENT)
Dept: ENDOCRINOLOGY | Facility: CLINIC | Age: 70
End: 2020-07-08
Payer: MEDICARE

## 2020-07-08 VITALS
OXYGEN SATURATION: 97 % | HEART RATE: 85 BPM | SYSTOLIC BLOOD PRESSURE: 125 MMHG | DIASTOLIC BLOOD PRESSURE: 80 MMHG | HEIGHT: 64 IN | WEIGHT: 168 LBS | BODY MASS INDEX: 28.68 KG/M2

## 2020-07-08 DIAGNOSIS — E11.9 TYPE 2 DIABETES MELLITUS WITHOUT COMPLICATION, WITHOUT LONG-TERM CURRENT USE OF INSULIN (H): ICD-10-CM

## 2020-07-08 PROCEDURE — 99213 OFFICE O/P EST LOW 20 MIN: CPT | Mod: 95 | Performed by: INTERNAL MEDICINE

## 2020-07-08 RX ORDER — LANCETS
EACH MISCELLANEOUS
Qty: 100 EACH | Refills: 3 | Status: SHIPPED | OUTPATIENT
Start: 2020-07-08 | End: 2021-08-04

## 2020-07-08 RX ORDER — BLOOD SUGAR DIAGNOSTIC
STRIP MISCELLANEOUS
Qty: 100 STRIP | Refills: 3 | Status: SHIPPED | OUTPATIENT
Start: 2020-07-08 | End: 2021-08-03

## 2020-07-08 RX ORDER — PEN NEEDLE, DIABETIC 32GX 5/32"
NEEDLE, DISPOSABLE MISCELLANEOUS
Qty: 100 EACH | Refills: 3 | Status: SHIPPED | OUTPATIENT
Start: 2020-07-08 | End: 2021-08-04

## 2020-07-08 RX ORDER — LIRAGLUTIDE 6 MG/ML
INJECTION SUBCUTANEOUS
Qty: 18 ML | Refills: 3 | Status: SHIPPED | OUTPATIENT
Start: 2020-07-08 | End: 2021-07-23

## 2020-07-08 RX ORDER — LANCETS
EACH MISCELLANEOUS
Qty: 200 EACH | Refills: 3 | Status: CANCELLED | OUTPATIENT
Start: 2020-07-08

## 2020-07-08 ASSESSMENT — MIFFLIN-ST. JEOR: SCORE: 1258.07

## 2020-07-08 NOTE — LETTER
"    7/8/2020         RE: Aleja Gudino  [address]  [place] [address]        Dear Colleague,    Thank you for referring your patient, Aleja Gudino, to the [facility]. Please see a copy of my visit note below.    Aleja Gudino is a 69 year old female who is being evaluated via a billable video visit.      The patient has been notified of following:     \"This video visit will be conducted via a call between you and your physician/provider. We have found that certain health care needs can be provided without the need for an in-person physical exam.  This service lets us provide the care you need with a video conversation.  If a prescription is necessary we can send it directly to your pharmacy.  If lab work is needed we can place an order for that and you can then stop by our lab to have the test done at a later time.    Video visits are billed at different rates depending on your insurance coverage.  Please reach out to your insurance provider with any questions.    If during the course of the call the physician/provider feels a video visit is not appropriate, you will not be charged for this service.\"    Patient has given verbal consent for Video visit? Yes  How would you like to obtain your AVS? Reviewed verbally  Patient would like the video invitation sent by: cell phone  Will anyone else be joining your video visit? No      Recent issues:  Diabetes followup  Feeling pretty well, but notices some wt gain, toe tingling, post heal pains            1993. Diagnosis of diabetes mellitus  ...Details of initial evaluation not available, body wt near 215#  Initial treatment with diet management  1997. Began metformin medication, but GI intolerance  Subsequent use of glimeparide, Tradjenta, basal insulin, Victoza, pioglitazone  2014. Began weight loss plan  5/2019. Discontinued pioglitazone, per plan    Current DM meds:  Victoza 0.6 mg + 5-clicks     Using ItsOn BG " meter   Tests QAM              BG 90d avg 129, 30d 138 mg/dl, 7d 153     Recent [facility] labs include:  Lab Results   Component Value Date    A1C 6.9 (H) 01/08/2020     08/28/2019    POTASSIUM 3.7 08/28/2019    CHLORIDE 105 08/28/2019    CO2 28 08/28/2019    ANIONGAP 9 08/28/2019     (H) 08/28/2019    BUN 17 08/28/2019    CR 0.73 08/28/2019    GFRESTIMATED 82 08/28/2019    GFRESTBLACK >90 08/28/2019    HUANG 9.0 08/28/2019    CHOL 190 01/08/2020    TRIG 68 01/08/2020    HDL 75 01/08/2020     (H) 01/08/2020    NHDL 115 01/08/2020    UCRR 24 08/28/2019    MICROL 9 08/28/2019    UMALCR 36.07 (H) 08/28/2019     Last eye exam with Dr. Matute 8/2019, no DR  DM Complications: none known        Lives in [facility]  Sees Dr. Tanmay Arriola/[facility] for general medicine evaluations.  Also sees Dr. Bush/[facility]        ROS: 10 point ROS neg other than the symptoms noted above in the HPI.     GENERAL: mild fatigue and wt gain; denies fevers, chills, night sweats.   HEENT: no dysphagia, odonophagia, diplopia, neck pain  THYROID:  no apparent hyper or hypothyroid symptoms  CV: no chest pain, pressure, palpitations  LUNGS: no SOB, SIDHU, cough, wheezing   ABDOMEN: no diarrhea, constipation, abdominal pain  EXTREMITIES: no rashes, ulcers, edema  NEUROLOGY: no headaches, denies changes in vision, tingling, extremitiy numbness   MSK: pains left 1st & 2nd fingers, arthralgias of knees; no muscle weakness  SKIN: scalp hair thinning; no rashes or lesions  :  No menses  PSYCH:  stable mood, no significant anxiety or depression  ENDOCRINE: no heat or cold intolerance     Physical Exam (visual exam)  VS:  no vital signs taken for video visit  GENERAL: healthy, alert and NAD, well dressed, answering questions appropriately  EYES: eyes grossly normal to inspection, conjunctivae and sclerae normal, no exophthalmos or proptosis  THYROID:  no apparent nodules or goiter  LUNGS: no audible wheeze,  cough or visible cyanosis, no visible retractions or increased work of breathing  ABDOMEN: abdomen not evaluated  EXTREMITIES: no hand tremors, limited exam  NEUROLOGY: CN grossly intact, mentation intact and speech normal   PSYCH: mentation appears normal, affect normal/bright, judgement and insight intact, normal speech and appearance well groomed       LABS:     All pertinent notes, labs, and images personally reviewed by me.      A/P:       Encounter Diagnoses   Name      Type 2 diabetes mellitus without complication, without long-term current use of insulin (H)      History of thyroid nodule          Comments:  Reviewed health history and diabetes issues.  Recent FBG levels mildly elevated     Plan:  Discussed general issues with the diabetes diagnosis and management  We discussed the hgbA1c test which reflects previous overall glucose levels or control  Discussed the importance of blood glucose (BG) testing to assess glucose trends  Provided general overview of the diabetes medication options and medication treatment plan.     Recommend:  Raise dose as Victoza 1.2 mg subcutaneous daily, updated Rx sent  Would not restart pioglitazone at this time  Check FBG daily or every other day, goal target  mg/dl  No labs ordered at this time  Monitor for symptom changes  Keep focus on diet, exercise, weight management.  Advise having fasting lipid panel testing and dilated eye examination, at least annually     Review the hair thinning with dermatologist  See PCP about heal pains  Addressed patient questions today       More than 50% of the time spent with Mrs. Gudino on counseling / coordinating her care.  Total appointment time was 15 minutes.     Follow-up:  9/2020     CHANCE Thorne MD, MS  Endocrinology  [facility]      Video-Visit Details    Type of service:  Video Visit    Video Start Time: 2:05 pm  Video End Time: 2:20 pm    Originating Location (pt. Location): home    Distant Location (provider  location):  [facility]     Platform used for Video Visit: AmWell            Again, thank you for allowing me to participate in the care of your patient.        Sincerely,        Hector Thorne MD

## 2020-07-08 NOTE — PROGRESS NOTES
"Aleja Gudino is a 69 year old female who is being evaluated via a billable video visit.      The patient has been notified of following:     \"This video visit will be conducted via a call between you and your physician/provider. We have found that certain health care needs can be provided without the need for an in-person physical exam.  This service lets us provide the care you need with a video conversation.  If a prescription is necessary we can send it directly to your pharmacy.  If lab work is needed we can place an order for that and you can then stop by our lab to have the test done at a later time.    Video visits are billed at different rates depending on your insurance coverage.  Please reach out to your insurance provider with any questions.    If during the course of the call the physician/provider feels a video visit is not appropriate, you will not be charged for this service.\"    Patient has given verbal consent for Video visit? Yes  How would you like to obtain your AVS? Reviewed verbally  Patient would like the video invitation sent by: cell phone  Will anyone else be joining your video visit? No      Recent issues:  Diabetes followup  Feeling pretty well, but notices some wt gain, toe tingling, post heal pains            1993. Diagnosis of diabetes mellitus  ...Details of initial evaluation not available, body wt near 215#  Initial treatment with diet management  1997. Began metformin medication, but GI intolerance  Subsequent use of glimeparide, Tradjenta, basal insulin, Victoza, pioglitazone  2014. Began weight loss plan  5/2019. Discontinued pioglitazone, per plan    Current DM meds:  Victoza 0.6 mg + 5-clicks     Using Accucheck BG meter   Tests QAM              BG 90d avg 129, 30d 138 mg/dl, 7d 153     Recent [facility] labs include:  Lab Results   Component Value Date    A1C 6.9 (H) 01/08/2020     08/28/2019    POTASSIUM 3.7 08/28/2019    CHLORIDE 105 08/28/2019    CO2 28 08/28/2019    " ANIONGAP 9 08/28/2019     (H) 08/28/2019    BUN 17 08/28/2019    CR 0.73 08/28/2019    GFRESTIMATED 82 08/28/2019    GFRESTBLACK >90 08/28/2019    HUANG 9.0 08/28/2019    CHOL 190 01/08/2020    TRIG 68 01/08/2020    HDL 75 01/08/2020     (H) 01/08/2020    NHDL 115 01/08/2020    UCRR 24 08/28/2019    MICROL 9 08/28/2019    UMALCR 36.07 (H) 08/28/2019     Last eye exam with Dr. Matute 8/2019, no DR  DM Complications: none known        Lives in [facility]  Sees Dr. Tanmay Arriola/[facility] for general medicine evaluations.  Also sees Dr. Bush/[facility]        ROS: 10 point ROS neg other than the symptoms noted above in the HPI.     GENERAL: mild fatigue and wt gain; denies fevers, chills, night sweats.   HEENT: no dysphagia, odonophagia, diplopia, neck pain  THYROID:  no apparent hyper or hypothyroid symptoms  CV: no chest pain, pressure, palpitations  LUNGS: no SOB, SIDHU, cough, wheezing   ABDOMEN: no diarrhea, constipation, abdominal pain  EXTREMITIES: no rashes, ulcers, edema  NEUROLOGY: no headaches, denies changes in vision, tingling, extremitiy numbness   MSK: pains left 1st & 2nd fingers, arthralgias of knees; no muscle weakness  SKIN: scalp hair thinning; no rashes or lesions  :  No menses  PSYCH:  stable mood, no significant anxiety or depression  ENDOCRINE: no heat or cold intolerance     Physical Exam (visual exam)  VS:  no vital signs taken for video visit  GENERAL: healthy, alert and NAD, well dressed, answering questions appropriately  EYES: eyes grossly normal to inspection, conjunctivae and sclerae normal, no exophthalmos or proptosis  THYROID:  no apparent nodules or goiter  LUNGS: no audible wheeze, cough or visible cyanosis, no visible retractions or increased work of breathing  ABDOMEN: abdomen not evaluated  EXTREMITIES: no hand tremors, limited exam  NEUROLOGY: CN grossly intact, mentation intact and speech normal   PSYCH: mentation appears normal,  affect normal/bright, judgement and insight intact, normal speech and appearance well groomed       LABS:     All pertinent notes, labs, and images personally reviewed by me.      A/P:  Encounter Diagnoses   Name      Type 2 diabetes mellitus without complication, without long-term current use of insulin (H)      History of thyroid nodule          Comments:  Reviewed health history and diabetes issues.  Recent FBG levels mildly elevated     Plan:  Discussed general issues with the diabetes diagnosis and management  We discussed the hgbA1c test which reflects previous overall glucose levels or control  Discussed the importance of blood glucose (BG) testing to assess glucose trends  Provided general overview of the diabetes medication options and medication treatment plan.     Recommend:  Raise dose as Victoza 1.2 mg subcutaneous daily, updated Rx sent  Would not restart pioglitazone at this time  Check FBG daily or every other day, goal target  mg/dl  No labs ordered at this time  Monitor for symptom changes  Keep focus on diet, exercise, weight management.  Advise having fasting lipid panel testing and dilated eye examination, at least annually     Review the hair thinning with dermatologist  See PCP about heal pains  Addressed patient questions today       More than 50% of the time spent with Mrs. Gudino on counseling / coordinating her care.  Total appointment time was 15 minutes.     Follow-up:  9/2020     CHANCE Thorne MD, MS  Endocrinology  [facility]      Video-Visit Details    Type of service:  Video Visit    Video Start Time: 2:05 pm  Video End Time: 2:20 pm    Originating Location (pt. Location): home    Distant Location (provider location):  [facility]     Platform used for Video Visit: ClickingHouse

## 2021-01-12 ENCOUNTER — TRANSFERRED RECORDS (OUTPATIENT)
Dept: HEALTH INFORMATION MANAGEMENT | Facility: CLINIC | Age: 71
End: 2021-01-12

## 2021-01-12 LAB — RETINOPATHY: NEGATIVE

## 2021-01-14 ENCOUNTER — HEALTH MAINTENANCE LETTER (OUTPATIENT)
Age: 71
End: 2021-01-14

## 2021-02-19 ENCOUNTER — OFFICE VISIT (OUTPATIENT)
Dept: ENDOCRINOLOGY | Facility: CLINIC | Age: 71
End: 2021-02-19
Payer: MEDICARE

## 2021-02-19 VITALS
DIASTOLIC BLOOD PRESSURE: 72 MMHG | BODY MASS INDEX: 31.8 KG/M2 | WEIGHT: 183.8 LBS | HEART RATE: 84 BPM | SYSTOLIC BLOOD PRESSURE: 173 MMHG

## 2021-02-19 DIAGNOSIS — E11.9 TYPE 2 DIABETES MELLITUS WITHOUT COMPLICATION, WITHOUT LONG-TERM CURRENT USE OF INSULIN (H): Primary | ICD-10-CM

## 2021-02-19 DIAGNOSIS — Z86.39 HISTORY OF THYROID NODULE: ICD-10-CM

## 2021-02-19 PROCEDURE — 99214 OFFICE O/P EST MOD 30 MIN: CPT | Performed by: INTERNAL MEDICINE

## 2021-02-19 RX ORDER — PIOGLITAZONEHYDROCHLORIDE 15 MG/1
15 TABLET ORAL DAILY
Qty: 30 TABLET | Refills: 3 | Status: SHIPPED | OUTPATIENT
Start: 2021-02-19 | End: 2021-06-09

## 2021-02-19 NOTE — PROGRESS NOTES
Recent issues:  Diabetes follow-up evaluation  Issues with COVID-19 pandemic wt gain, higher BG levels, also LBP and right lateral thigh pains  Feeling pretty well, but notices some wt gain, toe tingling, post heal pains            1993. Diagnosis of diabetes mellitus  ...Details of initial evaluation not available, body wt near 215#  Initial treatment with diet management  1997. Began metformin medication, but GI intolerance  Subsequent use of glimeparide, Tradjenta, basal insulin, Victoza, pioglitazone  2014. Began weight loss plan  5/2019. Discontinued pioglitazone, per plan     Current DM meds:  Victoza 0.6 mg + 5-clicks     Using Accucheck BG meter              Tests QAM   Trends 157-185 mg/dl    Previous [facility] labs include:             Recent [facility] labs include:  Lab Results   Component Value Date    A1C 6.9 (H) 01/08/2020     08/28/2019    POTASSIUM 3.7 08/28/2019    CHLORIDE 105 08/28/2019    CO2 28 08/28/2019    ANIONGAP 9 08/28/2019     (H) 08/28/2019    BUN 17 08/28/2019    CR 0.73 08/28/2019    GFRESTIMATED 82 08/28/2019    GFRESTBLACK >90 08/28/2019    HUANG 9.0 08/28/2019    CHOL 190 01/08/2020    TRIG 68 01/08/2020    HDL 75 01/08/2020     (H) 01/08/2020    NHDL 115 01/08/2020    UCRR 24 08/28/2019    MICROL 9 08/28/2019    UMALCR 36.07 (H) 08/28/2019     Last eye exam with Dr. Matute 8/2019, no DR  DM Complications: none known        Lives in [facility]  Sees Dr. Tanmay Arriola/[facility] for general medicine evaluations.  Also sees Dr. Bush/[facility]        ROS: 10 point ROS neg other than the symptoms noted above in the HPI.     GENERAL: mild fatigue and wt gain; denies fevers, chills, night sweats.   HEENT: no dysphagia, odonophagia, diplopia, neck pain  THYROID:  no apparent hyper or hypothyroid symptoms  CV: no chest pain, pressure, palpitations  LUNGS: no SOB, SIDHU, cough, wheezing   ABDOMEN: no diarrhea, constipation, abdominal pain  EXTREMITIES: no  rashes, ulcers, edema  NEUROLOGY: no headaches, denies changes in vision, tingling, extremitiy numbness   MSK: pains left 1st & 2nd fingers, arthralgias of knees; no muscle weakness  SKIN: scalp hair thinning; no rashes or lesions  :  No menses  PSYCH:  stable mood, no significant anxiety or depression  ENDOCRINE: no heat or cold intolerance       Physical Exam   VS: BP (!) 173/72   Pulse 84   Wt 83.4 kg (183 lb 12.8 oz)   BMI 31.80 kg/m    GENERAL: AXOX3, NAD, well dressed, answering questions appropriately, appears stated age.  ENT: no nose swelling or nasal discharge, mouth redness or gum changes.  EYES: eyes grossly normal to inspection, conjunctivae and sclerae normal, no exophthalmos or proptosis  THYROID:  Symmetric, nontender, no goiter or thyroid nodules on exam  LUNGS: no audible wheeze, cough or visible cyanosis, no visible retractions or increased work of breathing  ABDOMEN: abdomen obese size  EXTREMITIES: no edema, +pedal pulses, no lesions  NEUROLOGY: CN grossly intact, no tremors  MSK: grossly intact  SKIN:  no apparent skin lesions, rash, or edema with visualized skin appearance  PSYCH: mentation appears normal, affect normal/bright, judgement and insight intact,   normal speech and appearance well groomed       LABS:     All pertinent notes, labs, and images personally reviewed by me.      A/P:       Encounter Diagnoses   Name       Type 2 diabetes mellitus without complication, without long-term current use of insulin (H)       History of thyroid nodule           Comments:  Reviewed health history and diabetes issues.  Recent [facility] hgbA1c and FBG levels mildly elevated  Reviewed and interpreted tests that I previously ordered.   Ordered appropriate tests for the endocrinology disease management.    Management options discussed and implemented after shared medical decision making with the patient.    Plan:  Discussed general issues with the diabetes diagnosis and management  We discussed the  hgbA1c test which reflects previous overall glucose levels or control  Discussed the importance of blood glucose (BG) testing to assess glucose trends  Provided general overview of the diabetes medication options and medication treatment plan.     Recommend:  Raise Victoza dose as 1.2 mg + 5-clicks (1.5 mg) or if necessary, 1.8 mg daily  Resume pioglitazone 15 mg daily  Check FBG daily or every other day, goal target  mg/dl  No labs ordered at this time  Monitor for symptom changes  Keep focus on diet, exercise, weight management.  Advise having fasting lipid panel testing and dilated eye examination, at least annually     Arrange followup eval with PCP to review the LBP and right thigh pain... likely sciatica and may need lumbar spine MRI  Patient to follow up with their Primary Care Provider regarding the elevated blood pressure.  Addressed patient questions today     Total time spent in with the patient evaluation:  20 min  Additional time spent reviewing pertinent lab tests and chart notes, and documentation:  10 min    Follow-up:  6/2021    CHANCE Thorne MD, MS  Endocrinology  [facility]

## 2021-03-02 ENCOUNTER — MYC MEDICAL ADVICE (OUTPATIENT)
Dept: ENDOCRINOLOGY | Facility: CLINIC | Age: 71
End: 2021-03-02

## 2021-03-04 NOTE — TELEPHONE ENCOUNTER
See below updates from patient     Next 5 appointments (look out 90 days)    May 07, 2021 11:30 AM  Return Visit with Hector Thorne MD  [facility] ([facility] ) [address] [place] [id number] [phone]        Also she is scheduled for 5/7/21 OV     Tiffanie HARKINS RN

## 2021-04-12 ENCOUNTER — OFFICE VISIT (OUTPATIENT)
Dept: DERMATOLOGY | Facility: CLINIC | Age: 71
End: 2021-04-12
Payer: MEDICARE

## 2021-04-12 DIAGNOSIS — L57.0 AK (ACTINIC KERATOSIS): Primary | ICD-10-CM

## 2021-04-12 DIAGNOSIS — D48.5 NEOPLASM OF UNCERTAIN BEHAVIOR OF SKIN: ICD-10-CM

## 2021-04-12 DIAGNOSIS — D22.9 MULTIPLE BENIGN NEVI: ICD-10-CM

## 2021-04-12 DIAGNOSIS — L91.8 ACHROCHORDON: ICD-10-CM

## 2021-04-12 DIAGNOSIS — L81.4 LENTIGINES: ICD-10-CM

## 2021-04-12 PROCEDURE — 11200 RMVL SKIN TAGS UP TO&INC 15: CPT | Mod: XS | Performed by: PHYSICIAN ASSISTANT

## 2021-04-12 PROCEDURE — 99212 OFFICE O/P EST SF 10 MIN: CPT | Mod: 25 | Performed by: PHYSICIAN ASSISTANT

## 2021-04-12 PROCEDURE — 17000 DESTRUCT PREMALG LESION: CPT | Mod: XS | Performed by: PHYSICIAN ASSISTANT

## 2021-04-12 PROCEDURE — 11102 TANGNTL BX SKIN SINGLE LES: CPT | Performed by: PHYSICIAN ASSISTANT

## 2021-04-12 PROCEDURE — 88305 TISSUE EXAM BY PATHOLOGIST: CPT | Performed by: DERMATOLOGY

## 2021-04-12 RX ORDER — LIDOCAINE HYDROCHLORIDE AND EPINEPHRINE 10; 10 MG/ML; UG/ML
3 INJECTION, SOLUTION INFILTRATION; PERINEURAL ONCE
Status: COMPLETED | OUTPATIENT
Start: 2021-04-12 | End: 2021-04-12

## 2021-04-12 RX ADMIN — LIDOCAINE HYDROCHLORIDE AND EPINEPHRINE 3 ML: 10; 10 INJECTION, SOLUTION INFILTRATION; PERINEURAL at 12:05

## 2021-04-12 ASSESSMENT — PAIN SCALES - GENERAL
PAINLEVEL: NO PAIN (0)
PAINLEVEL: NO PAIN (0)

## 2021-04-12 NOTE — NURSING NOTE
Lidocaine-epinephrine 1-1:700822 % injection   0.4 mL once for one use, starting 4/12/2021 ending 4/12/2021,  2mL disp, R-0, injection  Injected by Niya ISSA

## 2021-04-12 NOTE — PATIENT INSTRUCTIONS
Cryotherapy    What is it?    Use of a very cold liquid, such as liquid nitrogen, to freeze and destroy abnormal skin cells that need to be removed    What should I expect?    Tenderness and redness    A small blister that might grow and fill with dark purple blood. There may be crusting.    More than one treatment may be needed if the lesions do not go away.    How do I care for the treated area?    Gently wash the area with your hands when bathing.    Use a thin layer of Vaseline to help with healing. You may use a Band-Aid.     The area should heal within 7-10 days and may leave behind a pink or lighter color.     Do not use an antibiotic or Neosporin ointment.     You may take acetaminophen (Tylenol) for pain.     Call your Doctor if you have:    Severe pain    Signs of infection (warmth, redness, cloudy yellow drainage, and or a bad smell)    Questions or concerns    Who should I call with questions?       [facility]: [phone]       [facility]: [phone]       For urgent needs outside of business hours call the [facility] at [phone]        and ask for the dermatology resident on call  Wound Care After a Biopsy    What is a skin biopsy?  A skin biopsy allows the doctor to examine a very small piece of tissue under the microscope to determine the diagnosis and the best treatment for the skin condition. A local anesthetic (numbing medicine)  is injected with a very small needle into the skin area to be tested. A small piece of skin is taken from the area. Sometimes a suture (stitch) is used.     What are the risks of a skin biopsy?  I will experience scar, bleeding, swelling, pain, crusting and redness. I may experience incomplete removal or recurrence. Risks of this procedure are excessive bleeding, bruising, infection, nerve damage, numbness, thick (hypertrophic or keloidal) scar and non-diagnostic biopsy.    How should I care for  my wound for the first 24 hours?    Keep the wound dry and covered for 24 hours    If it bleeds, hold direct pressure on the area for 15 minutes. If bleeding does not stop then go to the emergency room    Avoid strenuous exercise the first 1-2 days or as your doctor instructs you    How should I care for the wound after 24 hours?    After 24 hours, remove the bandage    You may bathe or shower as normal    If you had a scalp biopsy, you can shampoo as usual and can use shower water to clean the biopsy site daily    Clean the wound twice a day with gentle soap and water    Do not scrub, be gentle    Apply white petroleum/Vaseline after cleaning the wound with a cotton swab or a clean finger, and keep the site covered with a Bandaid /bandage. Bandages are not necessary with a scalp biopsy    If you are unable to cover the site with a Bandaid /bandage, re-apply ointment 2-3 times a day to keep the site moist. Moisture will help with healing    Avoid strenuous activity for first 1-2 days    Avoid lakes, rivers, pools, and oceans until the stitches are removed or the site is healed    How do I clean my wound?    Wash hands thoroughly with soap or use hand  before all wound care    Clean the wound with gentle soap and water    Apply white petroleum/Vaseline  to wound after it is clean    Replace the Bandaid /bandage to keep the wound covered for the first few days or as instructed by your doctor    If you had a scalp biopsy, warm shower water to the area on a daily basis should suffice    What should I use to clean my wound?     Cotton-tipped applicators (Qtips )    White petroleum jelly (Vaseline ). Use a clean new container and use Q-tips to apply.    Bandaids   as needed    Gentle soap           When should I call my doctor?  If you have increased:     Pain or swelling    Pus or drainage (clear or slightly yellow drainage is ok)    Temperature over 100F    Spreading redness or warmth around wound    When will  I hear about my results?  The biopsy results can take 2-3 weeks to come back. The clinic will call you with the results, send you a mychart message, or have you schedule a follow-up clinic or phone time to discuss the results. Contact our clinics if you do not hear from us in 3 weeks.     Who should I call with questions?    [facility]: [phone]     [facility]: [phone]    For urgent needs outside of business hours call the [facility] at [phone] and ask for the dermatology resident on call    The ABCDEs of Melanoma    Skin cancer can develop anywhere on the skin. Ask someone for help when checking your skin, especially in hard to see places. If you notice a mole different from others, or that changes, enlarges, itches, or bleeds (even if it is small), you should see a dermatologist.              Sun protective clothing and Resources     Simplificare (www.SmartThings)  Athleta (wwwGoldcoll Games)  Hammer and Grind (wwwBackand)  Carve Designs (Electro-Petroleum) - affordable  Skinz (Cloudnexaskinz.com)    Long sleeve - Devika Cool DRI UPF 50 or [place] PFG UPF 50  Hoodie - [place] PFG UPF 50  Swimshirt/Rash Guard - Amber UPF 50 (on Amazon)  Neck - Outdoor [employer] Ubertubes (www.outdoorNew KCBX.W[employer] Group)  Wound Care After a Biopsy    What is a skin biopsy?  A skin biopsy allows the doctor to examine a very small piece of tissue under the microscope to determine the diagnosis and the best treatment for the skin condition. A local anesthetic (numbing medicine)  is injected with a very small needle into the skin area to be tested. A small piece of skin is taken from the area. Sometimes a suture (stitch) is used.     What are the risks of a skin biopsy?  I will experience scar, bleeding, swelling, pain, crusting and redness. I may experience incomplete removal or recurrence. Risks of this procedure are excessive bleeding, bruising, infection, nerve  damage, numbness, thick (hypertrophic or keloidal) scar and non-diagnostic biopsy.    How should I care for my wound for the first 24 hours?    Keep the wound dry and covered for 24 hours    If it bleeds, hold direct pressure on the area for 15 minutes. If bleeding does not stop then go to the emergency room    Avoid strenuous exercise the first 1-2 days or as your doctor instructs you    How should I care for the wound after 24 hours?    After 24 hours, remove the bandage    You may bathe or shower as normal    If you had a scalp biopsy, you can shampoo as usual and can use shower water to clean the biopsy site daily    Clean the wound twice a day with gentle soap and water    Do not scrub, be gentle    Apply white petroleum/Vaseline after cleaning the wound with a cotton swab or a clean finger, and keep the site covered with a Bandaid /bandage. Bandages are not necessary with a scalp biopsy    If you are unable to cover the site with a Bandaid /bandage, re-apply ointment 2-3 times a day to keep the site moist. Moisture will help with healing    Avoid strenuous activity for first 1-2 days    Avoid lakes, rivers, pools, and oceans until the stitches are removed or the site is healed    How do I clean my wound?    Wash hands thoroughly with soap or use hand  before all wound care    Clean the wound with gentle soap and water    Apply white petroleum/Vaseline  to wound after it is clean    Replace the Bandaid /bandage to keep the wound covered for the first few days or as instructed by your doctor    If you had a scalp biopsy, warm shower water to the area on a daily basis should suffice    What should I use to clean my wound?     Cotton-tipped applicators (Qtips )    White petroleum jelly (Vaseline ). Use a clean new container and use Q-tips to apply.    Bandaids   as needed    Gentle soap     How should I care for my wound long term?    Do not get your wound dirty    Keep up with wound care for one week or  until the area is healed.    A small scab will form and fall off by itself when the area is completely healed. The area will be red and will become pink in color as it heals. Sun protection is very important for how your scar will turn out. Sunscreen with an SPF 30 or greater is recommended once the area is healed.    If you have stitches, stitches need to be removed in 14 days. You may return to our clinic for this or you may have it done locally at your doctor s office.    You should have some soreness but it should be mild and slowly go away over several days. Talk to your doctor about using tylenol for pain,    When should I call my doctor?  If you have increased:     Pain or swelling    Pus or drainage (clear or slightly yellow drainage is ok)    Temperature over 100F    Spreading redness or warmth around wound    When will I hear about my results?  The biopsy results can take 2-3 weeks to come back. The clinic will call you with the results, send you a [employer] message, or have you schedule a follow-up clinic or phone time to discuss the results. Contact our clinics if you do not hear from us in 3 weeks.     Who should I call with questions?    [facility]: [phone]     [facility]: [phone]    For urgent needs outside of business hours call the [facility] at [phone] and ask for the dermatology resident on call    Cryotherapy    What is it?    Use of a very cold liquid, such as liquid nitrogen, to freeze and destroy abnormal skin cells that need to be removed    What should I expect?    Tenderness and redness    A small blister that might grow and fill with dark purple blood. There may be crusting.    More than one treatment may be needed if the lesions do not go away.    How do I care for the treated area?    Gently wash the area with your hands when bathing.    Use a thin layer of Vaseline to help with healing. You may use a  Band-Aid.     The area should heal within 7-10 days and may leave behind a pink or lighter color.     Do not use an antibiotic or Neosporin ointment.     You may take acetaminophen (Tylenol) for pain.     Call your Doctor if you have:    Severe pain    Signs of infection (warmth, redness, cloudy yellow drainage, and or a bad smell)    Questions or concerns    Who should I call with questions?       [facility]: [phone]       [facility]: [phone]       For urgent needs outside of business hours call the [facility] at [phone]        and ask for the dermatology resident on call  Biopsy with Electrodesication and Curretage

## 2021-04-12 NOTE — LETTER
4/12/2021       RE: Aleja Gudino  [address]  [facility] [address]     Dear Colleague,    Thank you for referring your patient, Aleja Gudino, to the [facility] [place] at [facility]. Please see a copy of my visit note below.    [facility] Dermatology Note  Encounter Date: Apr 12, 2021  Office Visit      Dermatology Problem List:  0. NUB - R nasal dorsum - s/p bx 4/12/21  1. Hx of SCC, left thigh s/p removed in ~2000s  2. Hx of sebaceous hyperplasia, left cheek s/p 6/18/13  3. 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 History of AKs  5. ISK s/p cryo  6. Skin cancer screening 4/12/21  ____________________________________________    Assessment & Plan:  # Multiple clinically benign nevi on the trunk/extremities.   - ABCDEs: Counseled ABCDEs of melanoma: Asymmetry, Border (irregularity), Color (not uniform, changes in color), Diameter (greater than 6 mm which is about the size of a pencil eraser), and Evolving (any changes in preexisting moles).  - Monitor: Patient to continue monitoring at home and will contact the clinic for any changes.  - Sun protection: Counseled SPF30+ sunscreen, UPF clothing, sun avoidance, tanning bed avoidance.  - Continue annual skin exams     # Actinic keratosis x1 R temple.   - Cryotherapy performed today (see procedure note(s) below).     # Neoplasm of uncertain behavior on the R nasal dorsum. The differential diagnosis includes SH vs BCC vs other.   - Shave biopsy performed today (see procedure note(s) below).     # Solar lentigines.   -reassured benign    #Skin tag, inflamed - R neck x1  - Cryotherapy performed today (see procedure note(s) below).     Procedures Performed:   - Shave biopsy  procedure note, location(s): R nasal dorsum. After discussion of benefits and risks including but not limited to bleeding, infection, scar, incomplete removal, recurrence, and non-diagnostic biopsy, written consent and photographs were obtained. The area was cleaned with isopropyl alcohol. 0.5mL of 1% lidocaine with epinephrine was injected to obtain adequate anesthesia of lesion(s). Shave biopsy at site(s) performed. Hemostasis was achieved with aluminium chloride. Petrolatum ointment and a sterile dressing were applied. The patient tolerated the procedure and no complications were noted. The patient was provided with verbal and written post care instructions.     - Cryotherapy procedure note, location(s): R temple x1 (AK), R neck x1 (skin tag). After verbal consent and discussion of risks and benefits including, but not limited to, dyspigmentation/scar, blister, and pain, 2 lesion(s) was(were) treated with 1-2 mm freeze border for 1-2 cycles with liquid nitrogen. Post cryotherapy instructions were provided.     Follow-up: 1 year(s) in-person, or earlier for new or changing lesions    Staff:     All risks, benefits and alternatives were discussed with patient.  Patient is in agreement and understands the assessment and plan.  All questions were answered.    Kelly Sanchez PA-C, MPAS  [facility] [place]: Phone: [phone], Fax: [phone]  [facility]: Phone: [phone],  Fax: [phone]  ____________________________________________    CC: Skin Check (Aleja has a few spots of concern today.)      HPI:  Ms. Aleja Gudino is a 70 year old female who presents today as a return patient for a FBSE. Hx of FFA as above. Notes an irritated area on the temple and R neck. Hx of SCC in the past as well as AKs.     Patient is otherwise feeling well, without additional concerns.    Labs:  none    Physical Exam:  Vitals: There were no vitals  taken for this visit.  SKIN: Full skin, which includes the head/face, both arms, chest, back, abdomen,both legs, genitalia and/or groin buttocks, digits and/or nails, was examined.   - R nasal dorsum - 3mm pink papule  - There is an erythematous macule with overyling adherent scale on the R temple x1.   - Multiple regular brown pigmented macules and papules are identified on the trunk.   - There is(are) skin colored pedunculated papules with erythema on the R neck x 1.   - Scattered brown macules on sun exposed areas..   - Ward's skin type I-II, less than 100 nevi  - R temple - scaly erythematous macule x1  - No other lesions of concern on areas examined.     Medications:  Current Outpatient Medications   Medication     ACCU-CHEK CLARISA PLUS test strip     aspirin 81 MG tablet     blood glucose monitoring (ACCU-CHEK FASTCLIX) lancets     Cholecalciferol (VITAMIN D) 2000 UNITS tablet     diclofenac sodium 3 % GEL     fluticasone (FLONASE) 50 MCG/ACT nasal spray     insulin pen needle (BD YURIY U/F) 32G X 4 MM miscellaneous     latanoprost (XALATAN) 0.005 % ophthalmic solution     liraglutide (VICTOZA PEN) 18 MG/3ML solution     losartan-hydrochlorothiazide (HYZAAR) 100-12.5 MG tablet     NONFORMULARY     omeprazole (PRILOSEC) 20 MG CR capsule     pioglitazone (ACTOS) 15 MG tablet     piroxicam (FELDENE) 10 MG capsule     NONFORMULARY     No current facility-administered medications for this visit.       Past Medical/Surgical History:   Patient Active Problem List   Diagnosis     Cicatricial alopecia     Actinic keratosis     Dermatitis     Frontal fibrosing alopecia     Dermatitis, seborrheic     Elevated blood pressure reading     Allergic rhinitis     Breast microcalcifications     Carpal tunnel syndrome     Esophageal reflux     History of bowel resection     HTN (hypertension)     Ingrowing nail     Irritable bowel syndrome     Low back pain radiating to left lower extremity     Obstructive sleep apnea      Osteoarthritis     Other atopic dermatitis and related conditions     Overweight     Pain in joint, shoulder region     Postmenopausal atrophic vaginitis     Primary open angle glaucoma     Encounter for screening for osteoporosis     Thyroid nodule     Type 2 diabetes mellitus without complication, without long-term current use of insulin (H)     Asthma     Essential hypertension     Vitamin D deficiency     History of thyroid nodule     Past Medical History:   Diagnosis Date     Frontal fibrosing alopecia      Glaucoma      Hair loss      Hypertension      Sleep apnea     c-pap     Squamous cell carcinoma      Thyroid nodule      Type 2 diabetes mellitus (H)      Weight loss        CC Dr. Najera on close of this encounter.

## 2021-04-12 NOTE — NURSING NOTE
Dermatology Rooming Note    Aleja Gudino's goals for this visit include:   Chief Complaint   Patient presents with     Skin Check     Aleja has a few spots of concern today.     Niya Meade, CMA

## 2021-04-12 NOTE — PROGRESS NOTES
[facility] Dermatology Note  Encounter Date: Apr 12, 2021  Office Visit      Dermatology Problem List:  0. NUB - R nasal dorsum - s/p bx 4/12/21  1. Hx of SCC, left thigh s/p removed in ~2000s  2. Hx of sebaceous hyperplasia, left cheek s/p 6/18/13  3. 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 History of AKs  5. ISK s/p cryo  6. Skin cancer screening 4/12/21  ____________________________________________    Assessment & Plan:  # Multiple clinically benign nevi on the trunk/extremities.   - ABCDEs: Counseled ABCDEs of melanoma: Asymmetry, Border (irregularity), Color (not uniform, changes in color), Diameter (greater than 6 mm which is about the size of a pencil eraser), and Evolving (any changes in preexisting moles).  - Monitor: Patient to continue monitoring at home and will contact the clinic for any changes.  - Sun protection: Counseled SPF30+ sunscreen, UPF clothing, sun avoidance, tanning bed avoidance.  - Continue annual skin exams     # Actinic keratosis x1 R temple.   - Cryotherapy performed today (see procedure note(s) below).     # Neoplasm of uncertain behavior on the R nasal dorsum. The differential diagnosis includes SH vs BCC vs other.   - Shave biopsy performed today (see procedure note(s) below).     # Solar lentigines.   -reassured benign    #Skin tag, inflamed - R neck x1  - Cryotherapy performed today (see procedure note(s) below).     Procedures Performed:   - Shave biopsy procedure note, location(s): R nasal dorsum. After discussion of benefits and risks including but not limited to bleeding, infection, scar, incomplete removal, recurrence, and non-diagnostic biopsy, written consent and photographs were obtained. The area was cleaned with isopropyl alcohol. 0.5mL of 1% lidocaine with  epinephrine was injected to obtain adequate anesthesia of lesion(s). Shave biopsy at site(s) performed. Hemostasis was achieved with aluminium chloride. Petrolatum ointment and a sterile dressing were applied. The patient tolerated the procedure and no complications were noted. The patient was provided with verbal and written post care instructions.     - Cryotherapy procedure note, location(s): R temple x1 (AK), R neck x1 (skin tag). After verbal consent and discussion of risks and benefits including, but not limited to, dyspigmentation/scar, blister, and pain, 2 lesion(s) was(were) treated with 1-2 mm freeze border for 1-2 cycles with liquid nitrogen. Post cryotherapy instructions were provided.     Follow-up: 1 year(s) in-person, or earlier for new or changing lesions    Staff:     All risks, benefits and alternatives were discussed with patient.  Patient is in agreement and understands the assessment and plan.  All questions were answered.    Kelly Sanchez PA-C, MPAS  [facility]: Phone: [phone], Fax: [phone]  [facility]: Phone: [phone],  Fax: [phone]  ____________________________________________    CC: Skin Check (Aleja has a few spots of concern today.)      HPI:  Ms. Aleja Gudino is a 70 year old female who presents today as a return patient for a FBSE. Hx of FFA as above. Notes an irritated area on the temple and R neck. Hx of SCC in the past as well as AKs.     Patient is otherwise feeling well, without additional concerns.    Labs:  none    Physical Exam:  Vitals: There were no vitals taken for this visit.  SKIN: Full skin, which includes the head/face, both arms, chest, back, abdomen,both legs, genitalia and/or groin buttocks, digits and/or nails, was examined.   - R nasal dorsum - 3mm pink papule  - There is an erythematous macule with overyling adherent scale on the R temple x1.   - Multiple  regular brown pigmented macules and papules are identified on the trunk.   - There is(are) skin colored pedunculated papules with erythema on the R neck x 1.   - Scattered brown macules on sun exposed areas..   - Ward's skin type I-II, less than 100 nevi  - R temple - scaly erythematous macule x1  - No other lesions of concern on areas examined.     Medications:  Current Outpatient Medications   Medication     ACCU-CHEK CLARISA PLUS test strip     aspirin 81 MG tablet     blood glucose monitoring (ACCU-CHEK FASTCLIX) lancets     Cholecalciferol (VITAMIN D) 2000 UNITS tablet     diclofenac sodium 3 % GEL     fluticasone (FLONASE) 50 MCG/ACT nasal spray     insulin pen needle (BD YURIY U/F) 32G X 4 MM miscellaneous     latanoprost (XALATAN) 0.005 % ophthalmic solution     liraglutide (VICTOZA PEN) 18 MG/3ML solution     losartan-hydrochlorothiazide (HYZAAR) 100-12.5 MG tablet     NONFORMULARY     omeprazole (PRILOSEC) 20 MG CR capsule     pioglitazone (ACTOS) 15 MG tablet     piroxicam (FELDENE) 10 MG capsule     NONFORMULARY     No current facility-administered medications for this visit.       Past Medical/Surgical History:   Patient Active Problem List   Diagnosis     Cicatricial alopecia     Actinic keratosis     Dermatitis     Frontal fibrosing alopecia     Dermatitis, seborrheic     Elevated blood pressure reading     Allergic rhinitis     Breast microcalcifications     Carpal tunnel syndrome     Esophageal reflux     History of bowel resection     HTN (hypertension)     Ingrowing nail     Irritable bowel syndrome     Low back pain radiating to left lower extremity     Obstructive sleep apnea     Osteoarthritis     Other atopic dermatitis and related conditions     Overweight     Pain in joint, shoulder region     Postmenopausal atrophic vaginitis     Primary open angle glaucoma     Encounter for screening for osteoporosis     Thyroid nodule     Type 2 diabetes mellitus without complication, without  long-term current use of insulin (H)     Asthma     Essential hypertension     Vitamin D deficiency     History of thyroid nodule     Past Medical History:   Diagnosis Date     Frontal fibrosing alopecia      Glaucoma      Hair loss      Hypertension      Sleep apnea     c-pap     Squamous cell carcinoma      Thyroid nodule      Type 2 diabetes mellitus (H)      Weight loss        CC Dr. Najera on close of this encounter.

## 2021-04-18 LAB — COPATH REPORT: NORMAL

## 2021-05-07 ENCOUNTER — OFFICE VISIT (OUTPATIENT)
Dept: ENDOCRINOLOGY | Facility: CLINIC | Age: 71
End: 2021-05-07
Payer: MEDICARE

## 2021-05-07 VITALS
BODY MASS INDEX: 32.96 KG/M2 | HEART RATE: 83 BPM | DIASTOLIC BLOOD PRESSURE: 80 MMHG | WEIGHT: 190.5 LBS | SYSTOLIC BLOOD PRESSURE: 154 MMHG

## 2021-05-07 DIAGNOSIS — E11.9 TYPE 2 DIABETES MELLITUS WITHOUT COMPLICATION, WITHOUT LONG-TERM CURRENT USE OF INSULIN (H): Primary | ICD-10-CM

## 2021-05-07 DIAGNOSIS — Z86.39 HISTORY OF THYROID NODULE: ICD-10-CM

## 2021-05-07 PROCEDURE — 99213 OFFICE O/P EST LOW 20 MIN: CPT | Performed by: INTERNAL MEDICINE

## 2021-05-07 NOTE — PROGRESS NOTES
Recent issues:  Diabetes follow-up evaluation  Feeling pretty well, but notices some wt gain           1993. Diagnosis of diabetes mellitus  ...Details of initial evaluation not available, body wt near 215#  Initial treatment with diet management  1997. Began metformin medication, but GI intolerance  Subsequent use of glimeparide, Tradjenta, basal insulin, Victoza, pioglitazone  2014. Began weight loss plan  5/2019. Discontinued pioglitazone, per plan     Current DM meds:  Victoza 1.2 mg + 5-clicks Subcutaneous daily  Pioglitazone 15 mg  daily     Using Accucheck BG meter              Tests QAM   Trends 118- 135 mg/dl, overall avg 133 mg/dl    Previous [facility] labs include:          Recent [facility] labs include:  Lab Results   Component Value Date    A1C 6.9 (H) 01/08/2020     08/28/2019    POTASSIUM 3.7 08/28/2019    CHLORIDE 105 08/28/2019    CO2 28 08/28/2019    ANIONGAP 9 08/28/2019     (H) 08/28/2019    BUN 17 08/28/2019    CR 0.73 08/28/2019    GFRESTIMATED 82 08/28/2019    GFRESTBLACK >90 08/28/2019    HUANG 9.0 08/28/2019    CHOL 190 01/08/2020    TRIG 68 01/08/2020    HDL 75 01/08/2020     (H) 01/08/2020    NHDL 115 01/08/2020    UCRR 24 08/28/2019    MICROL 9 08/28/2019    UMALCR 36.07 (H) 08/28/2019     Last eye exam with Dr. Matute 8/2019, no DR  DM Complications: none known        Lives in [facility]  Sees Dr. Tanmay Arriola/[facility] for general medicine evaluations.  Also sees Dr. Bush/[facility]        ROS: 10 point ROS neg other than the symptoms noted above in the HPI.     GENERAL: mild fatigue and wt gain; denies fevers, chills, night sweats.   HEENT: no dysphagia, odonophagia, diplopia, neck pain  THYROID:  no apparent hyper or hypothyroid symptoms  CV: no chest pain, pressure, palpitations  LUNGS: no SOB, SIDHU, cough, wheezing   ABDOMEN: no diarrhea, constipation, abdominal pain  EXTREMITIES: no rashes, ulcers, edema  NEUROLOGY: no headaches, denies  changes in vision, tingling, extremitiy numbness   MSK: less back pains, some arthralgias of knees; no muscle weakness  SKIN: scalp hair thinning; no rashes or lesions  :  No menses  PSYCH:  stable mood, no significant anxiety or depression  ENDOCRINE: no heat or cold intolerance     Physical Exam   VS: BP (!) 154/80   Pulse 83   Wt 86.4 kg (190 lb 8 oz)   BMI 32.96 kg/m    GENERAL: AXOX3, NAD, well dressed, answering questions appropriately, appears stated age.  ENT: no nose swelling or nasal discharge, mouth redness or gum changes.  EYES: eyes grossly normal to inspection, conjunctivae and sclerae normal, no exophthalmos or proptosis  THYROID:  Symmetric, nontender, no goiter or thyroid nodules on exam  LUNGS: no audible wheeze, cough or visible cyanosis, no visible retractions or increased work of breathing  ABDOMEN: abdomen obese size  EXTREMITIES: no edema, no lesions  NEUROLOGY: CN grossly intact, no tremors  MSK: grossly intact  SKIN:  Wearing wig; no apparent skin lesions, rash, or edema with visualized skin appearance  PSYCH: mentation appears normal, affect normal/bright, judgement and insight intact,   normal speech and appearance well groomed       LABS:     All pertinent notes, labs, and images personally reviewed by me.      A/P:       Encounter Diagnoses   Name       Type 2 diabetes mellitus without complication, without long-term current use of insulin (H)       History of thyroid nodule           Comments:  Reviewed health history and diabetes issues.  Recent [facility] hgbA1c and FBG levels mildly elevated  Reviewed and interpreted tests that I previously ordered.   Ordered appropriate tests for the endocrinology disease management.    Management options discussed and implemented after shared medical decision making with the patient.  The T2DM problem is chronic-stable    Plan:  Discussed general issues with the diabetes diagnosis and management  We discussed the hgbA1c test which reflects  previous overall glucose levels or control  Discussed the importance of blood glucose (BG) testing to assess glucose trends  Provided general overview of the diabetes medication options and medication treatment plan.     Recommend:  Raise Victoza dose as 1.8 mg subcutaneous daily  Continue current pioglitazone 15 mg daily dose  Check FBG daily or every other day, goal target  mg/dl  No labs ordered at this time  Monitor for symptom changes  Keep focus on diet, exercise, weight management.  Advise having fasting lipid panel testing and dilated eye examination, at least annually     Patient to follow up with their Primary Care Provider regarding the elevated blood pressure.  Addressed patient questions today     Total time spent in with the patient evaluation:  20 min  Additional time spent reviewing pertinent lab tests and chart notes, and documentation:  5 min    Follow-up:  8/2021, Return    CHANCE Thorne MD, MS  Endocrinology  [facility]

## 2021-05-07 NOTE — LETTER
5/7/2021         RE: Aleja Gudino  [address]  [facility] [address]        Dear Colleague,    Thank you for referring your patient, Aleja Gudino, to the [facility] [place]. Please see a copy of my visit note below.    Recent issues:  Diabetes follow-up evaluation  Feeling pretty well, but notices some wt gain           1993. Diagnosis of diabetes mellitus  ...Details of initial evaluation not available, body wt near 215#  Initial treatment with diet management  1997. Began metformin medication, but GI intolerance  Subsequent use of glimeparide, Tradjenta, basal insulin, Victoza, pioglitazone  2014. Began weight loss plan  5/2019. Discontinued pioglitazone, per plan     Current DM meds:  Victoza 1.2 mg + 5-clicks Subcutaneous daily  Pioglitazone 15 mg  daily     Using Accucheck BG meter              Tests QAM   Trends 118- 135 mg/dl, overall avg 133 mg/dl    Previous [facility] labs include:          Recent [facility] labs include:  Lab Results   Component Value Date    A1C 6.9 (H) 01/08/2020     08/28/2019    POTASSIUM 3.7 08/28/2019    CHLORIDE 105 08/28/2019    CO2 28 08/28/2019    ANIONGAP 9 08/28/2019     (H) 08/28/2019    BUN 17 08/28/2019    CR 0.73 08/28/2019    GFRESTIMATED 82 08/28/2019    GFRESTBLACK >90 08/28/2019    HUANG 9.0 08/28/2019    CHOL 190 01/08/2020    TRIG 68 01/08/2020    HDL 75 01/08/2020     (H) 01/08/2020    NHDL 115 01/08/2020    UCRR 24 08/28/2019    MICROL 9 08/28/2019    UMALCR 36.07 (H) 08/28/2019     Last eye exam with Dr. Matute 8/2019, no DR  DM Complications: none known        Lives in [facility]  Sees Dr. Tanmay Arriola/[facility] for general medicine evaluations.  Also sees Dr. Bush/[facility]        ROS: 10 point ROS neg other than the symptoms noted above in the HPI.     GENERAL: mild fatigue and wt gain; denies fevers, chills, night sweats.   HEENT: no dysphagia, odonophagia, diplopia, neck  pain  THYROID:  no apparent hyper or hypothyroid symptoms  CV: no chest pain, pressure, palpitations  LUNGS: no SOB, SIDHU, cough, wheezing   ABDOMEN: no diarrhea, constipation, abdominal pain  EXTREMITIES: no rashes, ulcers, edema  NEUROLOGY: no headaches, denies changes in vision, tingling, extremitiy numbness   MSK: less back pains, some arthralgias of knees; no muscle weakness  SKIN: scalp hair thinning; no rashes or lesions  :  No menses  PSYCH:  stable mood, no significant anxiety or depression  ENDOCRINE: no heat or cold intolerance     Physical Exam   VS: BP (!) 154/80   Pulse 83   Wt 86.4 kg (190 lb 8 oz)   BMI 32.96 kg/m    GENERAL: AXOX3, NAD, well dressed, answering questions appropriately, appears stated age.  ENT: no nose swelling or nasal discharge, mouth redness or gum changes.  EYES: eyes grossly normal to inspection, conjunctivae and sclerae normal, no exophthalmos or proptosis  THYROID:  Symmetric, nontender, no goiter or thyroid nodules on exam  LUNGS: no audible wheeze, cough or visible cyanosis, no visible retractions or increased work of breathing  ABDOMEN: abdomen obese size  EXTREMITIES: no edema, no lesions  NEUROLOGY: CN grossly intact, no tremors  MSK: grossly intact  SKIN:  Wearing wig; no apparent skin lesions, rash, or edema with visualized skin appearance  PSYCH: mentation appears normal, affect normal/bright, judgement and insight intact,   normal speech and appearance well groomed       LABS:     All pertinent notes, labs, and images personally reviewed by me.      A/P:       Encounter Diagnoses   Name       Type 2 diabetes mellitus without complication, without long-term current use of insulin (H)       History of thyroid nodule           Comments:  Reviewed health history and diabetes issues.  Recent [facility] hgbA1c and FBG levels mildly elevated  Reviewed and interpreted tests that I previously ordered.   Ordered appropriate tests for the endocrinology disease management.     Management options discussed and implemented after shared medical decision making with the patient.  The T2DM problem is chronic-stable    Plan:  Discussed general issues with the diabetes diagnosis and management  We discussed the hgbA1c test which reflects previous overall glucose levels or control  Discussed the importance of blood glucose (BG) testing to assess glucose trends  Provided general overview of the diabetes medication options and medication treatment plan.     Recommend:  Raise Victoza dose as 1.8 mg subcutaneous daily  Continue current pioglitazone 15 mg daily dose  Check FBG daily or every other day, goal target  mg/dl  No labs ordered at this time  Monitor for symptom changes  Keep focus on diet, exercise, weight management.  Advise having fasting lipid panel testing and dilated eye examination, at least annually     Patient to follow up with their Primary Care Provider regarding the elevated blood pressure.  Addressed patient questions today     Total time spent in with the patient evaluation:  20 min  Additional time spent reviewing pertinent lab tests and chart notes, and documentation:  5 min    Follow-up:  8/2021, Return    CHANCE Thorne MD, MS  Endocrinology  [facility]                       Again, thank you for allowing me to participate in the care of your patient.        Sincerely,        Hector Thorne MD

## 2021-05-08 ENCOUNTER — HEALTH MAINTENANCE LETTER (OUTPATIENT)
Age: 71
End: 2021-05-08

## 2021-07-08 DIAGNOSIS — Z86.39 HISTORY OF THYROID NODULE: ICD-10-CM

## 2021-07-08 DIAGNOSIS — E11.9 TYPE 2 DIABETES MELLITUS WITHOUT COMPLICATION, WITHOUT LONG-TERM CURRENT USE OF INSULIN (H): Primary | ICD-10-CM

## 2021-07-22 ENCOUNTER — MYC MEDICAL ADVICE (OUTPATIENT)
Dept: ENDOCRINOLOGY | Facility: CLINIC | Age: 71
End: 2021-07-22

## 2021-07-22 DIAGNOSIS — E11.9 TYPE 2 DIABETES MELLITUS WITHOUT COMPLICATION, WITHOUT LONG-TERM CURRENT USE OF INSULIN (H): ICD-10-CM

## 2021-07-23 ENCOUNTER — LAB (OUTPATIENT)
Dept: LAB | Facility: CLINIC | Age: 71
End: 2021-07-23
Payer: MEDICARE

## 2021-07-23 DIAGNOSIS — E11.9 TYPE 2 DIABETES MELLITUS WITHOUT COMPLICATION, WITHOUT LONG-TERM CURRENT USE OF INSULIN (H): ICD-10-CM

## 2021-07-23 DIAGNOSIS — Z86.39 HISTORY OF THYROID NODULE: ICD-10-CM

## 2021-07-23 LAB — HBA1C MFR BLD: 6.6 % (ref 0–5.6)

## 2021-07-23 PROCEDURE — 80048 BASIC METABOLIC PNL TOTAL CA: CPT

## 2021-07-23 PROCEDURE — 84443 ASSAY THYROID STIM HORMONE: CPT

## 2021-07-23 PROCEDURE — 84460 ALANINE AMINO (ALT) (SGPT): CPT

## 2021-07-23 PROCEDURE — 36415 COLL VENOUS BLD VENIPUNCTURE: CPT

## 2021-07-23 PROCEDURE — 83036 HEMOGLOBIN GLYCOSYLATED A1C: CPT

## 2021-07-23 RX ORDER — LIRAGLUTIDE 6 MG/ML
INJECTION SUBCUTANEOUS
Qty: 27 ML | Refills: 0 | Status: SHIPPED | OUTPATIENT
Start: 2021-07-23 | End: 2022-02-23

## 2021-07-24 LAB
ALT SERPL W P-5'-P-CCNC: 25 U/L (ref 0–50)
ANION GAP SERPL CALCULATED.3IONS-SCNC: 7 MMOL/L (ref 3–14)
BUN SERPL-MCNC: 23 MG/DL (ref 7–30)
CALCIUM SERPL-MCNC: 9.1 MG/DL (ref 8.5–10.1)
CHLORIDE BLD-SCNC: 102 MMOL/L (ref 94–109)
CO2 SERPL-SCNC: 27 MMOL/L (ref 20–32)
CREAT SERPL-MCNC: 0.91 MG/DL (ref 0.52–1.04)
GFR SERPL CREATININE-BSD FRML MDRD: 63 ML/MIN/1.73M2
GLUCOSE BLD-MCNC: 150 MG/DL (ref 70–99)
POTASSIUM BLD-SCNC: 3.9 MMOL/L (ref 3.4–5.3)
SODIUM SERPL-SCNC: 136 MMOL/L (ref 133–144)
TSH SERPL DL<=0.005 MIU/L-ACNC: 0.81 MU/L (ref 0.4–4)

## 2021-08-03 DIAGNOSIS — E11.9 TYPE 2 DIABETES MELLITUS WITHOUT COMPLICATION, WITHOUT LONG-TERM CURRENT USE OF INSULIN (H): ICD-10-CM

## 2021-08-03 RX ORDER — BLOOD SUGAR DIAGNOSTIC
STRIP MISCELLANEOUS
Qty: 50 STRIP | Refills: 0 | Status: SHIPPED | OUTPATIENT
Start: 2021-08-03 | End: 2021-08-04

## 2021-08-04 ENCOUNTER — OFFICE VISIT (OUTPATIENT)
Dept: ENDOCRINOLOGY | Facility: CLINIC | Age: 71
End: 2021-08-04
Payer: MEDICARE

## 2021-08-04 VITALS
BODY MASS INDEX: 31.3 KG/M2 | WEIGHT: 180.9 LBS | OXYGEN SATURATION: 99 % | SYSTOLIC BLOOD PRESSURE: 138 MMHG | HEART RATE: 77 BPM | DIASTOLIC BLOOD PRESSURE: 66 MMHG

## 2021-08-04 DIAGNOSIS — E11.29 TYPE 2 DIABETES MELLITUS WITH MICROALBUMINURIA, WITHOUT LONG-TERM CURRENT USE OF INSULIN (H): Primary | ICD-10-CM

## 2021-08-04 DIAGNOSIS — Z86.39 HISTORY OF THYROID NODULE: ICD-10-CM

## 2021-08-04 DIAGNOSIS — E11.9 TYPE 2 DIABETES MELLITUS WITHOUT COMPLICATION, WITHOUT LONG-TERM CURRENT USE OF INSULIN (H): ICD-10-CM

## 2021-08-04 DIAGNOSIS — R80.9 TYPE 2 DIABETES MELLITUS WITH MICROALBUMINURIA, WITHOUT LONG-TERM CURRENT USE OF INSULIN (H): Primary | ICD-10-CM

## 2021-08-04 PROCEDURE — 99213 OFFICE O/P EST LOW 20 MIN: CPT | Performed by: INTERNAL MEDICINE

## 2021-08-04 RX ORDER — FAMOTIDINE 40 MG/1
TABLET, FILM COATED ORAL
COMMUNITY
Start: 2021-07-28 | End: 2024-07-31

## 2021-08-04 RX ORDER — PIROXICAM 10 MG/1
10 CAPSULE ORAL DAILY
Status: CANCELLED | OUTPATIENT
Start: 2021-08-04

## 2021-08-04 RX ORDER — LANCETS
EACH MISCELLANEOUS
Qty: 100 EACH | Refills: 3 | Status: SHIPPED | OUTPATIENT
Start: 2021-08-04 | End: 2023-02-03

## 2021-08-04 RX ORDER — PEN NEEDLE, DIABETIC 32GX 5/32"
NEEDLE, DISPOSABLE MISCELLANEOUS
Qty: 100 EACH | Refills: 3 | Status: SHIPPED | OUTPATIENT
Start: 2021-08-04

## 2021-08-04 NOTE — PROGRESS NOTES
Recent issues:  Diabetes follow-up evaluation  Feeling pretty well, success with weight loss on Livea (formerly Medifast) diet plan           1993. Diagnosis of diabetes mellitus  ...Details of initial evaluation not available, body wt near 215#  Initial treatment with diet management  1997. Began metformin medication, but GI intolerance  Subsequent use of glimeparide, Tradjenta, basal insulin, Victoza, pioglitazone  2014. Began weight loss plan  5/2019. Discontinued pioglitazone, per plan     Current DM meds:  Victoza 1.8 mg  Subcutaneous daily  Pioglitazone 15 mg  1-tab daily     Using Accucheck BG meter              Tests QAM   Trends 109-155 mg/dl, overall avg 126 mg/dl    Previous [facility] labs include:          Recent [facility] labs include:  Lab Results   Component Value Date    A1C 6.6 (H) 07/23/2021     07/23/2021    POTASSIUM 3.9 07/23/2021    CHLORIDE 102 07/23/2021    CO2 27 07/23/2021    ANIONGAP 7 07/23/2021     (H) 07/23/2021    BUN 23 07/23/2021    CR 0.91 07/23/2021    GFRESTIMATED 63 07/23/2021    GFRESTBLACK >90 08/28/2019    HUANG 9.1 07/23/2021    CHOL 190 01/08/2020    TRIG 68 01/08/2020    HDL 75 01/08/2020     (H) 01/08/2020    NHDL 115 01/08/2020    UCRR 24 08/28/2019    MICROL 9 08/28/2019    UMALCR 36.07 (H) 08/28/2019     Last eye exam with Dr. Matute 8/2021, no DR  DM Complications: none known        Lives in [facility]  Sees Dr. Tanmay Arriola/[facility] for general medicine evaluations.  Also sees Dr. Bush/[facility]        ROS: 10 point ROS neg other than the symptoms noted above in the HPI.     GENERAL: mild fatigue and wt gain; denies fevers, chills, night sweats.   HEENT: no dysphagia, odonophagia, diplopia, neck pain  THYROID:  no apparent hyper or hypothyroid symptoms  CV: no chest pain, pressure, palpitations  LUNGS: no SOB, SIDHU, cough, wheezing   ABDOMEN: no diarrhea, constipation, abdominal pain  EXTREMITIES: no rashes, ulcers,  edema  NEUROLOGY: no headaches, denies changes in vision, tingling, extremitiy numbness   MSK: less back pains, some arthralgias of knees; no muscle weakness  SKIN: scalp hair thinning; no rashes or lesions  :  No menses  PSYCH:  stable mood, no significant anxiety or depression  ENDOCRINE: no heat or cold intolerance     Physical Exam   VS: /66   Pulse 77   Wt 82.1 kg (180 lb 14.4 oz)   SpO2 99%   BMI 31.30 kg/m    GENERAL: AXOX3, NAD, well dressed, answering questions appropriately, appears stated age.  ENT: no nose swelling or nasal discharge, mouth redness or gum changes.  EYES: eyes grossly normal to inspection, conjunctivae and sclerae normal, no exophthalmos or proptosis  THYROID:  symmetric, nontender, no goiter or thyroid nodules on exam  CV:  RRR without murmurs  LUNGS: lungs CTA posteriorly  ABDOMEN: abdomen obese size  EXTREMITIES: no edema, no lesions  NEUROLOGY: CN grossly intact, no tremors  MSK: grossly intact  SKIN:  wearing wig; no apparent skin lesions, rash, or edema with visualized skin appearance  PSYCH: mentation appears normal, affect normal/bright, judgement and insight intact,   normal speech and appearance well groomed    LABS:     All pertinent notes, labs, and images personally reviewed by me.     A/P:  Encounter Diagnoses   Name Primary?     Type 2 diabetes mellitus with microalbuminuria, without long-term current use of insulin (H) Yes     History of thyroid nodule      Comments:  Reviewed health history and diabetes issues.  Recent [facility] hgbA1c and FBG levels mildly elevated  Reviewed and interpreted tests that I previously ordered.   Ordered appropriate tests for the endocrinology disease management.    Management options discussed and implemented after shared medical decision making with the patient.  The T2DM problem is chronic-stable    Plan:  Discussed general issues with the diabetes diagnosis and management  We discussed the hgbA1c test which reflects previous  overall glucose levels or control  Discussed the importance of blood glucose (BG) testing to assess glucose trends  Provided general overview of the diabetes medication options and medication treatment plan.     Recommend:  Continue the current Victoza dose as 1.8 mg subcutaneous daily  Check insurance cost of Ozempic vs Trulicity alternative meds  Discontinue the pioglitazone medicatin  Check FBG daily or every other day, goal target  mg/dl  No labs ordered at this time  Monitor for symptom changes  Keep focus on diet, exercise, weight management.  Advise having fasting lipid panel testing and dilated eye examination, at least annually     Addressed patient questions today     There are no Patient Instructions on file for this visit.    Future labs ordered today: No orders of the defined types were placed in this encounter.    Radiology/Consults ordered today: None    Total time spent in with the patient evaluation:  18 min  Additional time spent reviewing pertinent lab tests and chart notes, and documentation:  3 min    Follow-up:  11/2021    CHANCE Thorne MD, MS  Endocrinology  [facility]

## 2021-08-04 NOTE — LETTER
8/4/2021         RE: Aleja Gudino  [address]  [place] [facility] [address]        Dear Colleague,    Thank you for referring your patient, Aleja Gudino, to the [facility] [place]. Please see a copy of my visit note below.    Recent issues:  Diabetes follow-up evaluation  Feeling pretty well, success with weight loss on Livea (formerly Medifast) diet plan           1993. Diagnosis of diabetes mellitus  ...Details of initial evaluation not available, body wt near 215#  Initial treatment with diet management  1997. Began metformin medication, but GI intolerance  Subsequent use of glimeparide, Tradjenta, basal insulin, Victoza, pioglitazone  2014. Began weight loss plan  5/2019. Discontinued pioglitazone, per plan     Current DM meds:  Victoza 1.8 mg  Subcutaneous daily  Pioglitazone 15 mg  1-tab daily     Using Accucheck BG meter              Tests QAM   Trends 109-155 mg/dl, overall avg 126 mg/dl    Previous [facility] labs include:          Recent [facility] labs include:  Lab Results   Component Value Date    A1C 6.6 (H) 07/23/2021     07/23/2021    POTASSIUM 3.9 07/23/2021    CHLORIDE 102 07/23/2021    CO2 27 07/23/2021    ANIONGAP 7 07/23/2021     (H) 07/23/2021    BUN 23 07/23/2021    CR 0.91 07/23/2021    GFRESTIMATED 63 07/23/2021    GFRESTBLACK >90 08/28/2019    HUANG 9.1 07/23/2021    CHOL 190 01/08/2020    TRIG 68 01/08/2020    HDL 75 01/08/2020     (H) 01/08/2020    NHDL 115 01/08/2020    UCRR 24 08/28/2019    MICROL 9 08/28/2019    UMALCR 36.07 (H) 08/28/2019     Last eye exam with Dr. Matute 8/2021, no DR  DM Complications: none known        Lives in [facility]  Sees Dr. Tanmay [facility]ola/[facility] for general medicine evaluations.  Also sees Dr. Bush/[facility]        ROS: 10 point ROS neg other than the symptoms noted above in the HPI.     GENERAL: mild fatigue and wt gain; denies fevers, chills, night sweats.   HEENT: no dysphagia,  odonophagia, diplopia, neck pain  THYROID:  no apparent hyper or hypothyroid symptoms  CV: no chest pain, pressure, palpitations  LUNGS: no SOB, SIDHU, cough, wheezing   ABDOMEN: no diarrhea, constipation, abdominal pain  EXTREMITIES: no rashes, ulcers, edema  NEUROLOGY: no headaches, denies changes in vision, tingling, extremitiy numbness   MSK: less back pains, some arthralgias of knees; no muscle weakness  SKIN: scalp hair thinning; no rashes or lesions  :  No menses  PSYCH:  stable mood, no significant anxiety or depression  ENDOCRINE: no heat or cold intolerance     Physical Exam   VS: /66   Pulse 77   Wt 82.1 kg (180 lb 14.4 oz)   SpO2 99%   BMI 31.30 kg/m    GENERAL: AXOX3, NAD, well dressed, answering questions appropriately, appears stated age.  ENT: no nose swelling or nasal discharge, mouth redness or gum changes.  EYES: eyes grossly normal to inspection, conjunctivae and sclerae normal, no exophthalmos or proptosis  THYROID:  symmetric, nontender, no goiter or thyroid nodules on exam  CV:  RRR without murmurs  LUNGS: lungs CTA posteriorly  ABDOMEN: abdomen obese size  EXTREMITIES: no edema, no lesions  NEUROLOGY: CN grossly intact, no tremors  MSK: grossly intact  SKIN:  wearing wig; no apparent skin lesions, rash, or edema with visualized skin appearance  PSYCH: mentation appears normal, affect normal/bright, judgement and insight intact,   normal speech and appearance well groomed    LABS:     All pertinent notes, labs, and images personally reviewed by me.     A/P:  Encounter Diagnoses   Name Primary?     Type 2 diabetes mellitus with microalbuminuria, without long-term current use of insulin (H) Yes     History of thyroid nodule      Comments:  Reviewed health history and diabetes issues.  Recent [facility] hgbA1c and FBG levels mildly elevated  Reviewed and interpreted tests that I previously ordered.   Ordered appropriate tests for the endocrinology disease management.    Management options  discussed and implemented after shared medical decision making with the patient.  The T2DM problem is chronic-stable    Plan:  Discussed general issues with the diabetes diagnosis and management  We discussed the hgbA1c test which reflects previous overall glucose levels or control  Discussed the importance of blood glucose (BG) testing to assess glucose trends  Provided general overview of the diabetes medication options and medication treatment plan.     Recommend:  Continue the current Victoza dose as 1.8 mg subcutaneous daily  Check insurance cost of Ozempic vs Trulicity alternative meds  Discontinue the pioglitazone medicatin  Check FBG daily or every other day, goal target  mg/dl  No labs ordered at this time  Monitor for symptom changes  Keep focus on diet, exercise, weight management.  Advise having fasting lipid panel testing and dilated eye examination, at least annually     Addressed patient questions today     There are no Patient Instructions on file for this visit.    Future labs ordered today: No orders of the defined types were placed in this encounter.    Radiology/Consults ordered today: None    Total time spent in with the patient evaluation:  18 min  Additional time spent reviewing pertinent lab tests and chart notes, and documentation:  3 min    Follow-up:  11/2021    CHANCE Thorne MD, MS  Endocrinology  [facility]                         Again, thank you for allowing me to participate in the care of your patient.        Sincerely,        Hector Thorne MD

## 2021-08-05 ENCOUNTER — MYC MEDICAL ADVICE (OUTPATIENT)
Dept: ENDOCRINOLOGY | Facility: CLINIC | Age: 71
End: 2021-08-05

## 2021-08-05 DIAGNOSIS — E11.29 TYPE 2 DIABETES MELLITUS WITH MICROALBUMINURIA, WITHOUT LONG-TERM CURRENT USE OF INSULIN (H): Primary | ICD-10-CM

## 2021-08-05 DIAGNOSIS — R80.9 TYPE 2 DIABETES MELLITUS WITH MICROALBUMINURIA, WITHOUT LONG-TERM CURRENT USE OF INSULIN (H): Primary | ICD-10-CM

## 2021-10-23 ENCOUNTER — HEALTH MAINTENANCE LETTER (OUTPATIENT)
Age: 71
End: 2021-10-23

## 2021-10-25 ENCOUNTER — OFFICE VISIT (OUTPATIENT)
Dept: DERMATOLOGY | Facility: CLINIC | Age: 71
End: 2021-10-25
Payer: MEDICARE

## 2021-10-25 DIAGNOSIS — L57.0 ACTINIC KERATOSIS: ICD-10-CM

## 2021-10-25 DIAGNOSIS — L82.1 SEBORRHEIC KERATOSES: ICD-10-CM

## 2021-10-25 DIAGNOSIS — D18.01 CHERRY ANGIOMA: ICD-10-CM

## 2021-10-25 DIAGNOSIS — D48.5 NEOPLASM OF UNCERTAIN BEHAVIOR OF SKIN: Primary | ICD-10-CM

## 2021-10-25 PROCEDURE — 99213 OFFICE O/P EST LOW 20 MIN: CPT | Mod: 25 | Performed by: PHYSICIAN ASSISTANT

## 2021-10-25 PROCEDURE — 17003 DESTRUCT PREMALG LES 2-14: CPT | Mod: XS | Performed by: PHYSICIAN ASSISTANT

## 2021-10-25 PROCEDURE — 88341 IMHCHEM/IMCYTCHM EA ADD ANTB: CPT | Mod: 26 | Performed by: PATHOLOGY

## 2021-10-25 PROCEDURE — 88342 IMHCHEM/IMCYTCHM 1ST ANTB: CPT | Mod: 26 | Performed by: PATHOLOGY

## 2021-10-25 PROCEDURE — 88305 TISSUE EXAM BY PATHOLOGIST: CPT | Mod: 26 | Performed by: PATHOLOGY

## 2021-10-25 PROCEDURE — 11102 TANGNTL BX SKIN SINGLE LES: CPT | Performed by: PHYSICIAN ASSISTANT

## 2021-10-25 PROCEDURE — 17000 DESTRUCT PREMALG LESION: CPT | Mod: XS | Performed by: PHYSICIAN ASSISTANT

## 2021-10-25 PROCEDURE — 88341 IMHCHEM/IMCYTCHM EA ADD ANTB: CPT | Mod: TC | Performed by: PHYSICIAN ASSISTANT

## 2021-10-25 ASSESSMENT — PAIN SCALES - GENERAL: PAINLEVEL: NO PAIN (0)

## 2021-10-25 NOTE — NURSING NOTE
Chief Complaint   Patient presents with     Derm Problem     new growth      Pt is here for a new spot of concern.  It is on the bridge of her nose and has been there for a month.  This is the left side. The spot on the right side was previously removed.  Pt states she has red spots on her chest that are now spreading.  Pt states she the red spots have been there for over year.    Starla Sams LPN on 10/25/2021 at 10:48 AM

## 2021-10-25 NOTE — PROGRESS NOTES
[facility] Dermatology Note  Encounter Date: Oct 25, 2021  Office Visit     Dermatology Problem List:  0. NUB, right nasal sidewall s/p bx 10/25/2021   1. Hx of SCC, left thigh s/p removed in ~2000s  2. Hx of sebaceous hyperplasia,  - left cheek s/p bx 6/18/13  - right nasal dorsum - s/p bx 4/12/21  3. 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 AKs s/p cryo  5. ISK s/p cryo  6. Skin cancer screening 4/12/21    ____________________________________________    Assessment & Plan:    # NUB, right nasal sidewall, s/p biopsy ddx BCC vs ISK vs other  - Shave biopsy today, see note below.    # Actinic keratoses, tip of the nose, right cheek  - Cryo today, see note below.    # Cherry angiomas, chest  # Seborrheic keratosis, left upper nasal sidewall  - Reassured of benign nature.    Procedures Performed:   - Shave biopsy procedure note, location(s): see above. After discussion of benefits and risks including but not limited to bleeding, infection, scar, incomplete removal, recurrence, and non-diagnostic biopsy, written consent and photographs were obtained. The area was cleaned with isopropyl alcohol. 0.5mL of 1% lidocaine with epinephrine was injected to obtain adequate anesthesia of lesion(s). Shave biopsy at site(s) performed. Hemostasis was achieved with aluminium chloride. Petrolatum ointment and a sterile dressing were applied. The patient tolerated the procedure and no complications were noted. The patient was provided with verbal and written post care instructions.     - Cryotherapy procedure note, location(s): see above. After verbal consent and discussion of risks and benefits including, but not limited to, dyspigmentation/scar, blister, and pain, 2 lesion(s) was(were) treated with 1-2 mm freeze border for 1-2  cycles with liquid nitrogen. Post cryotherapy instructions were provided.    Follow-up: 1 year(s) in-person, or earlier for new or changing lesions    Staff and Scribe:     Provider Disclosure:   The documentation recorded by the scribe accurately reflects the services I personally performed and the decisions made by me.    All risks, benefits and alternatives were discussed with patient.  Patient is in agreement and understands the assessment and plan.  All questions were answered.  Sun Screen Education was given.   Return to Clinic annually or sooner as needed.   Ita Dillard PA-C   [facility]     Scribe Disclosure:  I, Gonzalo Higuera, am serving as a scribe to document services personally performed by Ita Dillard PA-C based on data collection and the provider's statements to me.   ____________________________________________    CC: Derm Problem (new growth)    HPI:  Ms. Aleja Gudino is a(n) 71 year old female who presents today as a return patient for skin check. Last seen in dermatology by Kelly Sanchez PA-C, on 4/12/2021, at which time patient underwent shave biopsy for NUB on the right nasal dorsum which returned as sebaceous hyperplasia. Additionally, patient received cryo for treatment of an actinic keratosis and an inflamed skin tag.    Today, patient reports that a spot on her nose started growing 2 months ago. It is on the opposite side of the nose from a spot that was biopsied previously. She is interested in having several other spots examined.    Patient is otherwise feeling well, without additional skin concerns.    Labs Reviewed:  N/A    Physical Exam:  Vitals: There were no vitals taken for this visit.  SKIN: Focused examination of face and chest was performed.  - right nasal sidewall 3 mm pink scaly papule.  - There is a waxy stuck on tan to brown papule on the left upper nasal sidewall.   - There are erythematous macules with overyling adherent  scale on the right cheek and right tip of the nose.   - There are dome shaped bright red papules on the chest.  - No other lesions of concern on areas examined.     Medications:  Current Outpatient Medications   Medication     aspirin 81 MG tablet     blood glucose (NO BRAND SPECIFIED) test strip     blood glucose monitoring (ACCU-CHEK FASTCLIX) lancets     Cholecalciferol (VITAMIN D) 2000 UNITS tablet     diclofenac sodium 3 % GEL     famotidine (PEPCID) 40 MG tablet     fluticasone (FLONASE) 50 MCG/ACT nasal spray     insulin pen needle (BD YURIY U/F) 32G X 4 MM miscellaneous     latanoprost (XALATAN) 0.005 % ophthalmic solution     liraglutide (VICTOZA PEN) 18 MG/3ML solution     losartan-hydrochlorothiazide (HYZAAR) 100-12.5 MG tablet     NONFORMULARY     NONFORMULARY     omeprazole (PRILOSEC) 20 MG CR capsule     pioglitazone (ACTOS) 15 MG tablet     piroxicam (FELDENE) 10 MG capsule     semaglutide (OZEMPIC) 2 MG/1.5ML SOPN pen     No current facility-administered medications for this visit.      Past Medical History:   Patient Active Problem List   Diagnosis     Cicatricial alopecia     Actinic keratosis     Dermatitis     Frontal fibrosing alopecia     Dermatitis, seborrheic     Elevated blood pressure reading     Allergic rhinitis     Breast microcalcifications     Carpal tunnel syndrome     Esophageal reflux     History of bowel resection     HTN (hypertension)     Ingrowing nail     Irritable bowel syndrome     Low back pain radiating to left lower extremity     Obstructive sleep apnea     Osteoarthritis     Other atopic dermatitis and related conditions     Overweight     Pain in joint, shoulder region     Postmenopausal atrophic vaginitis     Primary open angle glaucoma     Encounter for screening for osteoporosis     Thyroid nodule     Type 2 diabetes mellitus without complication, without long-term current use of insulin (H)     Asthma     Essential hypertension     Vitamin D deficiency     History of  thyroid nodule     Past Medical History:   Diagnosis Date     Frontal fibrosing alopecia      Glaucoma      Hair loss      Hypertension      Sleep apnea     c-pap     Squamous cell carcinoma      Thyroid nodule      Type 2 diabetes mellitus (H)      Weight loss         CC No referring provider defined for this encounter. on close of this encounter.

## 2021-10-25 NOTE — LETTER
10/25/2021       RE: Aleja Gudino  [address]  [place] [address]     Dear Colleague,    Thank you for referring your patient, Aleja Gudino, to the [facility] [place] at [facility]. Please see a copy of my visit note below.    [facility] Dermatology Note  Encounter Date: Oct 25, 2021  Office Visit     Dermatology Problem List:  0. NUB, right nasal sidewall s/p bx 10/25/2021   1. Hx of SCC, left thigh s/p removed in ~2000s  2. Hx of sebaceous hyperplasia,  - left cheek s/p bx 6/18/13  - right nasal dorsum - s/p bx 4/12/21  3. 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 AKs s/p cryo  5. ISK s/p cryo  6. Skin cancer screening 4/12/21    ____________________________________________    Assessment & Plan:    # NUB, right nasal sidewall, s/p biopsy ddx BCC vs ISK vs other  - Shave biopsy today, see note below.    # Actinic keratoses, tip of the nose, right cheek  - Cryo today, see note below.    # Cherry angiomas, chest  # Seborrheic keratosis, left upper nasal sidewall  - Reassured of benign nature.    Procedures Performed:   - Shave biopsy procedure note, location(s): see above. After discussion of benefits and risks including but not limited to bleeding, infection, scar, incomplete removal, recurrence, and non-diagnostic biopsy, written consent and photographs were obtained. The area was cleaned with isopropyl alcohol. 0.5mL of 1% lidocaine with epinephrine was injected to obtain adequate anesthesia of lesion(s). Shave biopsy at site(s) performed. Hemostasis was achieved with aluminium chloride. Petrolatum ointment and a sterile dressing were applied. The patient tolerated the procedure and no complications were noted. The  patient was provided with verbal and written post care instructions.     - Cryotherapy procedure note, location(s): see above. After verbal consent and discussion of risks and benefits including, but not limited to, dyspigmentation/scar, blister, and pain, 2 lesion(s) was(were) treated with 1-2 mm freeze border for 1-2 cycles with liquid nitrogen. Post cryotherapy instructions were provided.    Follow-up: 1 year(s) in-person, or earlier for new or changing lesions    Staff and Scribe:     Provider Disclosure:   The documentation recorded by the scribe accurately reflects the services I personally performed and the decisions made by me.    All risks, benefits and alternatives were discussed with patient.  Patient is in agreement and understands the assessment and plan.  All questions were answered.  Sun Screen Education was given.   Return to Clinic annually or sooner as needed.   Ita Dillard PA-C   [facility]     Scribe Disclosure:  I, Gonzalo Higuera, am serving as a scribe to document services personally performed by Ita Dillard PA-C based on data collection and the provider's statements to me.   ____________________________________________    CC: Derm Problem (new growth)    HPI:  Ms. Aleja Gudino is a(n) 71 year old female who presents today as a return patient for skin check. Last seen in dermatology by Kelly Sanchez PA-C, on 4/12/2021, at which time patient underwent shave biopsy for NUB on the right nasal dorsum which returned as sebaceous hyperplasia. Additionally, patient received cryo for treatment of an actinic keratosis and an inflamed skin tag.    Today, patient reports that a spot on her nose started growing 2 months ago. It is on the opposite side of the nose from a spot that was biopsied previously. She is interested in having several other spots examined.    Patient is otherwise feeling well, without additional skin concerns.    Labs  Reviewed:  N/A    Physical Exam:  Vitals: There were no vitals taken for this visit.  SKIN: Focused examination of face and chest was performed.  - right nasal sidewall 3 mm pink scaly papule.  - There is a waxy stuck on tan to brown papule on the left upper nasal sidewall.   - There are erythematous macules with overyling adherent scale on the right cheek and right tip of the nose.   - There are dome shaped bright red papules on the chest.  - No other lesions of concern on areas examined.     Medications:  Current Outpatient Medications   Medication     aspirin 81 MG tablet     blood glucose (NO BRAND SPECIFIED) test strip     blood glucose monitoring (ACCU-CHEK FASTCLIX) lancets     Cholecalciferol (VITAMIN D) 2000 UNITS tablet     diclofenac sodium 3 % GEL     famotidine (PEPCID) 40 MG tablet     fluticasone (FLONASE) 50 MCG/ACT nasal spray     insulin pen needle (BD YURIY U/F) 32G X 4 MM miscellaneous     latanoprost (XALATAN) 0.005 % ophthalmic solution     liraglutide (VICTOZA PEN) 18 MG/3ML solution     losartan-hydrochlorothiazide (HYZAAR) 100-12.5 MG tablet     NONFORMULARY     NONFORMULARY     omeprazole (PRILOSEC) 20 MG CR capsule     pioglitazone (ACTOS) 15 MG tablet     piroxicam (FELDENE) 10 MG capsule     semaglutide (OZEMPIC) 2 MG/1.5ML SOPN pen     No current facility-administered medications for this visit.      Past Medical History:   Patient Active Problem List   Diagnosis     Cicatricial alopecia     Actinic keratosis     Dermatitis     Frontal fibrosing alopecia     Dermatitis, seborrheic     Elevated blood pressure reading     Allergic rhinitis     Breast microcalcifications     Carpal tunnel syndrome     Esophageal reflux     History of bowel resection     HTN (hypertension)     Ingrowing nail     Irritable bowel syndrome     Low back pain radiating to left lower extremity     Obstructive sleep apnea     Osteoarthritis     Other atopic dermatitis and related conditions     Overweight     Pain  in joint, shoulder region     Postmenopausal atrophic vaginitis     Primary open angle glaucoma     Encounter for screening for osteoporosis     Thyroid nodule     Type 2 diabetes mellitus without complication, without long-term current use of insulin (H)     Asthma     Essential hypertension     Vitamin D deficiency     History of thyroid nodule     Past Medical History:   Diagnosis Date     Frontal fibrosing alopecia      Glaucoma      Hair loss      Hypertension      Sleep apnea     c-pap     Squamous cell carcinoma      Thyroid nodule      Type 2 diabetes mellitus (H)      Weight loss         CC No referring provider defined for this encounter. on close of this encounter.

## 2021-10-25 NOTE — PATIENT INSTRUCTIONS
Cryotherapy    What is it?    Use of a very cold liquid, such as liquid nitrogen, to freeze and destroy abnormal skin cells that need to be removed    What should I expect?    Tenderness and redness    A small blister that might grow and fill with dark purple blood. There may be crusting.    More than one treatment may be needed if the lesions do not go away.    How do I care for the treated area?    Gently wash the area with your hands when bathing.    Use a thin layer of Vaseline to help with healing. You may use a Band-Aid.     The area should heal within 7-10 days and may leave behind a pink or lighter color.     Do not use an antibiotic or Neosporin ointment.     You may take acetaminophen (Tylenol) for pain.     Call your doctor if you have:    Severe pain    Signs of infection (warmth, redness, cloudy yellow drainage, and or a bad smell)    Questions or concerns    Who should I call with questions?       [facility]: [phone]       [facility]: [phone]       For urgent needs outside of business hours call the [facility] at [phone] and ask for the dermatology resident on call  Wound Care After a Biopsy    What is a skin biopsy?  A skin biopsy allows the doctor to examine a very small piece of tissue under the microscope to determine the diagnosis and the best treatment for the skin condition. A local anesthetic (numbing medicine)  is injected with a very small needle into the skin area to be tested. A small piece of skin is taken from the area. Sometimes a suture (stitch) is used.     What are the risks of a skin biopsy?  I will experience scar, bleeding, swelling, pain, crusting and redness. I may experience incomplete removal or recurrence. Risks of this procedure are excessive bleeding, bruising, infection, nerve damage, numbness, thick (hypertrophic or keloidal) scar and non-diagnostic biopsy.    How should I care for my  wound for the first 24 hours?    Keep the wound dry and covered for 24 hours    If it bleeds, hold direct pressure on the area for 15 minutes. If bleeding does not stop then go to the emergency room    Avoid strenuous exercise the first 1-2 days or as your doctor instructs you    How should I care for the wound after 24 hours?    After 24 hours, remove the bandage    You may bathe or shower as normal    If you had a scalp biopsy, you can shampoo as usual and can use shower water to clean the biopsy site daily    Clean the wound twice a day with gentle soap and water    Do not scrub, be gentle    Apply white petroleum/Vaseline after cleaning the wound with a cotton swab or a clean finger, and keep the site covered with a Bandaid /bandage. Bandages are not necessary with a scalp biopsy    If you are unable to cover the site with a Bandaid /bandage, re-apply ointment 2-3 times a day to keep the site moist. Moisture will help with healing    Avoid strenuous activity for first 1-2 days    Avoid lakes, rivers, pools, and oceans until the stitches are removed or the site is healed    How do I clean my wound?    Wash hands thoroughly with soap or use hand  before all wound care    Clean the wound with gentle soap and water    Apply white petroleum/Vaseline  to wound after it is clean    Replace the Bandaid /bandage to keep the wound covered for the first few days or as instructed by your doctor    If you had a scalp biopsy, warm shower water to the area on a daily basis should suffice    What should I use to clean my wound?     Cotton-tipped applicators (Qtips )    White petroleum jelly (Vaseline ). Use a clean new container and use Q-tips to apply.    Bandaids   as needed    Gentle soap     How should I care for my wound long term?    Do not get your wound dirty    Keep up with wound care for one week or until the area is healed.    A small scab will form and fall off by itself when the area is completely healed.  The area will be red and will become pink in color as it heals. Sun protection is very important for how your scar will turn out. Sunscreen with an SPF 30 or greater is recommended once the area is healed.    You should have some soreness but it should be mild and slowly go away over several days. Talk to your doctor about using tylenol for pain,    When should I call my doctor?  If you have increased:     Pain or swelling    Pus or drainage (clear or slightly yellow drainage is ok)    Temperature over 100F    Spreading redness or warmth around wound    When will I hear about my results?  The biopsy results can take 2 weeks to come back.  Your results will automatically release to [employer] before your provider has even reviewed them.  The clinic will call you with the results, send you a [employer] message, or have you schedule a follow-up clinic or phone time to discuss the results.  Contact our clinics if you do not hear from us in 2 weeks.    Who should I call with questions?    [facility]: [phone]    [facility]: [phone]    For urgent needs outside of business hours call the [facility] at [phone] and ask for the dermatology resident on call

## 2021-10-27 LAB
PATH REPORT.COMMENTS IMP SPEC: NORMAL
PATH REPORT.FINAL DX SPEC: NORMAL
PATH REPORT.GROSS SPEC: NORMAL
PATH REPORT.MICROSCOPIC SPEC OTHER STN: NORMAL
PATH REPORT.RELEVANT HX SPEC: NORMAL

## 2021-11-09 ENCOUNTER — OFFICE VISIT (OUTPATIENT)
Dept: ENDOCRINOLOGY | Facility: CLINIC | Age: 71
End: 2021-11-09
Payer: MEDICARE

## 2021-11-09 VITALS
DIASTOLIC BLOOD PRESSURE: 77 MMHG | SYSTOLIC BLOOD PRESSURE: 135 MMHG | BODY MASS INDEX: 29.13 KG/M2 | HEART RATE: 81 BPM | WEIGHT: 168.4 LBS

## 2021-11-09 DIAGNOSIS — R80.9 TYPE 2 DIABETES MELLITUS WITH MICROALBUMINURIA, WITHOUT LONG-TERM CURRENT USE OF INSULIN (H): Primary | ICD-10-CM

## 2021-11-09 DIAGNOSIS — E11.29 TYPE 2 DIABETES MELLITUS WITH MICROALBUMINURIA, WITHOUT LONG-TERM CURRENT USE OF INSULIN (H): Primary | ICD-10-CM

## 2021-11-09 PROCEDURE — 99214 OFFICE O/P EST MOD 30 MIN: CPT | Performed by: INTERNAL MEDICINE

## 2021-11-09 NOTE — PROGRESS NOTES
Recent issues:  Diabetes follow-up evaluation  Feeling pretty well, success with weight loss on Livea (formerly Medifast) diet plan  Wt loss ~22#/6 mo, consuming 1 meal (divided into breakfast & supper meals) and also 5 snack bars/day           1993. Diagnosis of diabetes mellitus  ...Details of initial evaluation not available, body wt near 215#  Initial treatment with diet management  1997. Began metformin medication, but GI intolerance  Subsequent use of glimeparide, Tradjenta, basal insulin, Victoza, pioglitazone  2014. Began weight loss plan  5/2019. Discontinued pioglitazone, per plan  11/1/21. Stopped Victoza and changed to Ozempic     Current DM meds:  Ozempic 0.5 mg  Subcutaneous weekly     Using Accucheck BG meter              Tests 1-2x/day   Trends 109-170 mg/dl, overall avg 128 mg/dl    Previous [facility] labs include:          Recent [facility] labs include:  Lab Results   Component Value Date    A1C 6.6 (H) 07/23/2021     07/23/2021    POTASSIUM 3.9 07/23/2021    CHLORIDE 102 07/23/2021    CO2 27 07/23/2021    ANIONGAP 7 07/23/2021     (H) 07/23/2021    BUN 23 07/23/2021    CR 0.91 07/23/2021    GFRESTIMATED 63 07/23/2021    GFRESTBLACK >90 08/28/2019    HUANG 9.1 07/23/2021    CHOL 190 01/08/2020    TRIG 68 01/08/2020    HDL 75 01/08/2020     (H) 01/08/2020    NHDL 115 01/08/2020    UCRR 24 08/28/2019    MICROL 9 08/28/2019    UMALCR 36.07 (H) 08/28/2019     Last eye exam with Dr. Matute 8/2021, no DR  DM Complications: none known        L[facility] in [facility]  [facility] for [facility] evaluations.  Also sees Dr. Bush/[facility]       PMH/PSH:  Past Medical History:   Diagnosis Date     Frontal fibrosing alopecia      Glaucoma      Hair loss      Hypertension      Sleep apnea     c-pap     Squamous cell carcinoma      Thyroid nodule      Type 2 diabetes mellitus (H)      Weight loss      Past Surgical History:   Procedure Laterality Date      BIOPSY OF SKIN LESION       CARPAL TUNNEL RELEASE RT/LT        SECTION       CHOLECYSTECTOMY       COLONOSCOPY N/A 3/19/2015    Procedure: COLONOSCOPY;  Surgeon: Ck Childress MD;  Location: [facility]     Excision oral melanotic macule  2013    lower right lip     HC LAP,FULGURATE/EXCISE LESIONS      tumors removed and colon resection     KNEE SURGERY      arthroscopic- left knee     NO HISTORY OF SURGERY  13    derm     ORTHOPEDIC SURGERY      right and left knee replacements     RELEASE TRIGGER FINGER       RESECTION ABDOMINAL PERINEAL         Family Hx:  Family History   Problem Relation Age of Onset     Cancer No family hx of         No family history of skin cancer     Melanoma No family hx of      Skin Cancer No family hx of          Social Hx:  Social History     Socioeconomic History     Marital status:      Spouse name: Not on file     Number of children: Not on file     Years of education: Not on file     Highest education level: Not on file   Occupational History     Not on file   Tobacco Use     Smoking status: Never Smoker     Smokeless tobacco: Never Used   Substance and Sexual Activity     Alcohol use: No     Alcohol/week: 0.0 standard drinks     Drug use: No     Sexual activity: Not on file   Other Topics Concern     Parent/sibling w/ CABG, MI or angioplasty before 65F 55M? Not Asked   Social History Narrative     Not on file     Social Determinants of Health     Financial Resource Strain: Not on file   Food Insecurity: Not on file   Transportation Needs: Not on file   Physical Activity: Not on file   Stress: Not on file   Social Connections: Not on file   Intimate Partner Violence: Not on file   Housing Stability: Not on file          MEDICATIONS:  has a current medication list which includes the following prescription(s): aspirin, vitamin d3, diclofenac sodium, famotidine, fluticasone, latanoprost, losartan-hydrochlorothiazide, NONFORMULARY, omeprazole, piroxicam,  semaglutide, blood glucose, blood glucose monitoring, bd desire u/f, victoza pen, NONFORMULARY, and pioglitazone.       ROS: 10 point ROS neg other than the symptoms noted above in the HPI.     GENERAL: mild fatigue, weight loss as noted; denies fevers, chills, night sweats.   HEENT: no dysphagia, odonophagia, diplopia, neck pain  THYROID:  no apparent hyper or hypothyroid symptoms  CV: no chest pain, pressure, palpitations  LUNGS: no SOB, SIDHU, cough, wheezing   ABDOMEN: no diarrhea, constipation, abdominal pain  EXTREMITIES: no rashes, ulcers, edema  NEUROLOGY: no headaches, denies changes in vision, tingling, extremitiy numbness   MSK: less back pains, some arthralgias of knees; no muscle weakness  SKIN: scalp hair thinning; no rashes or lesions  :  No menses  PSYCH:  stable mood, no significant anxiety or depression  ENDOCRINE: no heat or cold intolerance     Physical Exam   VS: /77   Pulse 81   Wt 76.4 kg (168 lb 6.4 oz)   BMI 29.13 kg/m    GENERAL: AXOX3, NAD, well dressed, answering questions appropriately, appears stated age.  ENT: no nose swelling or nasal discharge, mouth redness or gum changes.  EYES: eyes grossly normal to inspection, conjunctivae and sclerae normal, no exophthalmos or proptosis  THYROID:  symmetric, nontender, no goiter or thyroid nodules on exam  LUNGS: no audible wheeze, cough or visible cyanosis, no visible retractions or increased work of breathing  ABDOMEN: abdomen mildly obese size  EXTREMITIES: no edema, no lesions  NEUROLOGY: CN grossly intact, no tremors  MSK: grossly intact  SKIN:  wearing wig; no apparent skin lesions, rash, or edema with visualized skin appearance  PSYCH: mentation appears normal, affect normal/bright, judgement and insight intact,   normal speech and appearance well groomed    LABS:     All pertinent notes, labs, and images personally reviewed by me.     A/P:  Encounter Diagnosis   Name Primary?     Type 2 diabetes mellitus with microalbuminuria,  without long-term current use of insulin (H) Yes       Comments:  Reviewed health history and diabetes issues.  Recent glycemic control very good, tolerating Ozempic med well  Reviewed and interpreted tests that I previously ordered.   Ordered appropriate tests for the endocrinology disease management.    Management options discussed and implemented after shared medical decision making with the patient.  T2DM problem is chronic-stable    Plan:  Discussed general issues with the diabetes diagnosis and management  We discussed the hgbA1c test which reflects previous overall glucose levels or control  Discussed the importance of blood glucose (BG) testing to assess glucose trends  Provided general overview of the diabetes medication options and medication treatment plan.     Recommend:  Continue the current Ozempic 0.5 mg subcutaneous weekly  Check FBG daily or every other day, goal target  mg/dl  Plan repeat lab tests in 2/2022   Discussed nearby [facility] AV clinic   Lab orders placed  Monitor for symptom changes  Keep focus on diet, exercise, weight management.  Advise having fasting lipid panel testing and dilated eye examination, at least annually     Addressed patient questions today     There are no Patient Instructions on file for this visit.    Future labs ordered today:   Orders Placed This Encounter   Procedures     Basic metabolic panel     Hemoglobin A1c     Lipid panel reflex to direct LDL Fasting     TSH     Albumin Random Urine Quantitative with Creat Ratio     Radiology/Consults ordered today: None    Total time spent in with the patient evaluation:  15 min  Additional time spent reviewing pertinent lab tests and chart notes, and documentation:  5 min    Follow-up:  Late 2/2022    CHANCE Thorne MD, MS  Endocrinology  [facility]

## 2021-11-09 NOTE — LETTER
11/9/2021         RE: Aleja Gudino  [address]  [place] [facility] [place] [address]        Dear Colleague,    Thank you for referring your patient, Aleja Gudino, to the [facility] [place]. Please see a copy of my visit note below.    Recent issues:  Diabetes follow-up evaluation  Feeling pretty well, success with weight loss on Livea (formerly Medifast) diet plan  Wt loss ~22#/6 mo, consuming 1 meal (divided into breakfast & supper meals) and also 5 snack bars/day           1993. Diagnosis of diabetes mellitus  ...Details of initial evaluation not available, body wt near 215#  Initial treatment with diet management  1997. Began metformin medication, but GI intolerance  Subsequent use of glimeparide, Tradjenta, basal insulin, Victoza, pioglitazone  2014. Began weight loss plan  5/2019. Discontinued pioglitazone, per plan  11/1/21. Stopped Victoza and changed to Ozempic     Current DM meds:  Ozempic 0.5 mg  Subcutaneous weekly     Using Accucheck BG meter              Tests 1-2x/day   Trends 109-170 mg/dl, overall avg 128 mg/dl    Previous [facility] labs include:          Recent  labs include:  Lab Results   Component Value Date    A1C 6.6 (H) 07/23/2021     07/23/2021    POTASSIUM 3.9 07/23/2021    CHLORIDE 102 07/23/2021    CO2 27 07/23/2021    ANIONGAP 7 07/23/2021     (H) 07/23/2021    BUN 23 07/23/2021    CR 0.91 07/23/2021    GFRESTIMATED 63 07/23/2021    GFRESTBLACK >90 08/28/2019    HUANG 9.1 07/23/2021    CHOL 190 01/08/2020    TRIG 68 01/08/2020    HDL 75 01/08/2020     (H) 01/08/2020    NHDL 115 01/08/2020    UCRR 24 08/28/2019    MICROL 9 08/28/2019    UMALCR 36.07 (H) 08/28/2019     Last eye exam with Dr. Matute 8/2021, no DR  DM Complications: none known        [facility] in [facility]  Sees Dr. Tanmay [facility] for general medicine evaluations.  Also sees Dr. Bush/[facility]       PMH/PSH:  Past Medical History:   Diagnosis  Date     Frontal fibrosing alopecia      Glaucoma      Hair loss      Hypertension      Sleep apnea     c-pap     Squamous cell carcinoma      Thyroid nodule      Type 2 diabetes mellitus (H)      Weight loss      Past Surgical History:   Procedure Laterality Date     BIOPSY OF SKIN LESION       CARPAL TUNNEL RELEASE RT/LT        SECTION       CHOLECYSTECTOMY       COLONOSCOPY N/A 3/19/2015    Procedure: COLONOSCOPY;  Surgeon: Ck Childress MD;  Location: [facility]     Excision oral melanotic macule  2013    lower right lip     HC LAP,FULGURATE/EXCISE LESIONS      tumors removed and colon resection     KNEE SURGERY      arthroscopic- left knee     NO HISTORY OF SURGERY  11    derm     ORTHOPEDIC SURGERY      right and left knee replacements     RELEASE TRIGGER FINGER       RESECTION ABDOMINAL PERINEAL         Family Hx:  Family History   Problem Relation Age of Onset     Cancer No family hx of         No family history of skin cancer     Melanoma No family hx of      Skin Cancer No family hx of          Social Hx:  Social History     Socioeconomic History     Marital status:      Spouse name: Not on file     Number of children: Not on file     Years of education: Not on file     Highest education level: Not on file   Occupational History     Not on file   Tobacco Use     Smoking status: Never Smoker     Smokeless tobacco: Never Used   Substance and Sexual Activity     Alcohol use: No     Alcohol/week: 0.0 standard drinks     Drug use: No     Sexual activity: Not on file   Other Topics Concern     Parent/sibling w/ CABG, MI or angioplasty before 65F 55M? Not Asked   Social History Narrative     Not on file     Social Determinants of Health     Financial Resource Strain: Not on file   Food Insecurity: Not on file   Transportation Needs: Not on file   Physical Activity: Not on file   Stress: Not on file   Social Connections: Not on file   Intimate Partner Violence: Not on file   Housing  Stability: Not on file          MEDICATIONS:  has a current medication list which includes the following prescription(s): aspirin, vitamin d3, diclofenac sodium, famotidine, fluticasone, latanoprost, losartan-hydrochlorothiazide, NONFORMULARY, omeprazole, piroxicam, semaglutide, blood glucose, blood glucose monitoring, bd desire u/f, victoza pen, NONFORMULARY, and pioglitazone.       ROS: 10 point ROS neg other than the symptoms noted above in the HPI.     GENERAL: mild fatigue, weight loss as noted; denies fevers, chills, night sweats.   HEENT: no dysphagia, odonophagia, diplopia, neck pain  THYROID:  no apparent hyper or hypothyroid symptoms  CV: no chest pain, pressure, palpitations  LUNGS: no SOB, SIDHU, cough, wheezing   ABDOMEN: no diarrhea, constipation, abdominal pain  EXTREMITIES: no rashes, ulcers, edema  NEUROLOGY: no headaches, denies changes in vision, tingling, extremitiy numbness   MSK: less back pains, some arthralgias of knees; no muscle weakness  SKIN: scalp hair thinning; no rashes or lesions  :  No menses  PSYCH:  stable mood, no significant anxiety or depression  ENDOCRINE: no heat or cold intolerance     Physical Exam   VS: /77   Pulse 81   Wt 76.4 kg (168 lb 6.4 oz)   BMI 29.13 kg/m    GENERAL: AXOX3, NAD, well dressed, answering questions appropriately, appears stated age.  ENT: no nose swelling or nasal discharge, mouth redness or gum changes.  EYES: eyes grossly normal to inspection, conjunctivae and sclerae normal, no exophthalmos or proptosis  THYROID:  symmetric, nontender, no goiter or thyroid nodules on exam  LUNGS: no audible wheeze, cough or visible cyanosis, no visible retractions or increased work of breathing  ABDOMEN: abdomen mildly obese size  EXTREMITIES: no edema, no lesions  NEUROLOGY: CN grossly intact, no tremors  MSK: grossly intact  SKIN:  wearing wig; no apparent skin lesions, rash, or edema with visualized skin appearance  PSYCH: mentation appears normal, affect  normal/bright, judgement and insight intact,   normal speech and appearance well groomed    LABS:     All pertinent notes, labs, and images personally reviewed by me.     A/P:  Encounter Diagnosis   Name Primary?     Type 2 diabetes mellitus with microalbuminuria, without long-term current use of insulin (H) Yes       Comments:  Reviewed health history and diabetes issues.  Recent glycemic control very good, tolerating Ozempic med well  Reviewed and interpreted tests that I previously ordered.   Ordered appropriate tests for the endocrinology disease management.    Management options discussed and implemented after shared medical decision making with the patient.  T2DM problem is chronic-stable    Plan:  Discussed general issues with the diabetes diagnosis and management  We discussed the hgbA1c test which reflects previous overall glucose levels or control  Discussed the importance of blood glucose (BG) testing to assess glucose trends  Provided general overview of the diabetes medication options and medication treatment plan.     Recommend:  Continue the current Ozempic 0.5 mg subcutaneous weekly  Check FBG daily or every other day, goal target  mg/dl  Plan repeat lab tests in 2/2022   Discussed nearby [facility] AV clinic   Lab orders placed  Monitor for symptom changes  Keep focus on diet, exercise, weight management.  Advise having fasting lipid panel testing and dilated eye examination, at least annually     Addressed patient questions today     There are no Patient Instructions on file for this visit.    Future labs ordered today:   Orders Placed This Encounter   Procedures     Basic metabolic panel     Hemoglobin A1c     Lipid panel reflex to direct LDL Fasting     TSH     Albumin Random Urine Quantitative with Creat Ratio     Radiology/Consults ordered today: None    Total time spent in with the patient evaluation:  15 min  Additional time spent reviewing pertinent lab tests and chart notes, and  documentation:  5 min    Follow-up:  Late 2/2022    CHANCE Thorne MD, MS  Endocrinology  [facility]                           Again, thank you for allowing me to participate in the care of your patient.        Sincerely,        Hector Thorne MD

## 2021-11-26 ENCOUNTER — LAB (OUTPATIENT)
Dept: LAB | Facility: CLINIC | Age: 71
End: 2021-11-26
Payer: MEDICARE

## 2021-11-26 DIAGNOSIS — E11.29 TYPE 2 DIABETES MELLITUS WITH MICROALBUMINURIA, WITHOUT LONG-TERM CURRENT USE OF INSULIN (H): ICD-10-CM

## 2021-11-26 DIAGNOSIS — R80.9 TYPE 2 DIABETES MELLITUS WITH MICROALBUMINURIA, WITHOUT LONG-TERM CURRENT USE OF INSULIN (H): ICD-10-CM

## 2021-11-26 LAB
ANION GAP SERPL CALCULATED.3IONS-SCNC: 3 MMOL/L (ref 3–14)
BUN SERPL-MCNC: 21 MG/DL (ref 7–30)
CALCIUM SERPL-MCNC: 9 MG/DL (ref 8.5–10.1)
CHLORIDE BLD-SCNC: 98 MMOL/L (ref 94–109)
CHOLEST SERPL-MCNC: 154 MG/DL
CO2 SERPL-SCNC: 32 MMOL/L (ref 20–32)
CREAT SERPL-MCNC: 0.8 MG/DL (ref 0.52–1.04)
CREAT UR-MCNC: 38 MG/DL
FASTING STATUS PATIENT QL REPORTED: YES
GFR SERPL CREATININE-BSD FRML MDRD: 73 ML/MIN/1.73M2
GLUCOSE BLD-MCNC: 159 MG/DL (ref 70–99)
HBA1C MFR BLD: 6.9 % (ref 0–5.6)
HDLC SERPL-MCNC: 60 MG/DL
LDLC SERPL CALC-MCNC: 78 MG/DL
MICROALBUMIN UR-MCNC: 7 MG/L
MICROALBUMIN/CREAT UR: 18.42 MG/G CR (ref 0–25)
NONHDLC SERPL-MCNC: 94 MG/DL
POTASSIUM BLD-SCNC: 3.7 MMOL/L (ref 3.4–5.3)
SODIUM SERPL-SCNC: 133 MMOL/L (ref 133–144)
TRIGL SERPL-MCNC: 81 MG/DL
TSH SERPL DL<=0.005 MIU/L-ACNC: 1.85 MU/L (ref 0.4–4)

## 2021-11-26 PROCEDURE — 36415 COLL VENOUS BLD VENIPUNCTURE: CPT

## 2021-11-26 PROCEDURE — 80061 LIPID PANEL: CPT

## 2021-11-26 PROCEDURE — 82043 UR ALBUMIN QUANTITATIVE: CPT

## 2021-11-26 PROCEDURE — 83036 HEMOGLOBIN GLYCOSYLATED A1C: CPT

## 2021-11-26 PROCEDURE — 84443 ASSAY THYROID STIM HORMONE: CPT

## 2021-11-26 PROCEDURE — 80048 BASIC METABOLIC PNL TOTAL CA: CPT

## 2022-01-25 ENCOUNTER — MYC MEDICAL ADVICE (OUTPATIENT)
Dept: ENDOCRINOLOGY | Facility: CLINIC | Age: 72
End: 2022-01-25
Payer: MEDICARE

## 2022-02-23 ENCOUNTER — OFFICE VISIT (OUTPATIENT)
Dept: ENDOCRINOLOGY | Facility: CLINIC | Age: 72
End: 2022-02-23
Payer: MEDICARE

## 2022-02-23 VITALS
WEIGHT: 151.3 LBS | DIASTOLIC BLOOD PRESSURE: 82 MMHG | BODY MASS INDEX: 26.17 KG/M2 | HEART RATE: 81 BPM | SYSTOLIC BLOOD PRESSURE: 143 MMHG

## 2022-02-23 DIAGNOSIS — R80.9 TYPE 2 DIABETES MELLITUS WITH MICROALBUMINURIA, WITHOUT LONG-TERM CURRENT USE OF INSULIN (H): Primary | ICD-10-CM

## 2022-02-23 DIAGNOSIS — E11.29 TYPE 2 DIABETES MELLITUS WITH MICROALBUMINURIA, WITHOUT LONG-TERM CURRENT USE OF INSULIN (H): Primary | ICD-10-CM

## 2022-02-23 PROCEDURE — 99213 OFFICE O/P EST LOW 20 MIN: CPT | Performed by: INTERNAL MEDICINE

## 2022-02-23 NOTE — LETTER
"    2/23/2022         RE: Aleja Gudino  [address]  [facility] [address]        Dear Colleague,    Thank you for referring your patient, Aleja Gudino, to the [facility] [place]. Please see a copy of my visit note below.    Recent issues:  Diabetes follow-up evaluation  Feeling pretty well, success with weight loss on Livea (formerly Medifast) diet plan, now on Livea \"simple\" maintenance phase  Additional wt loss approx 15#, her goal wt 151-155#           1993. Diagnosis of diabetes mellitus  ...Details of initial evaluation not available, body wt near 215#  Initial treatment with diet management  1997. Began metformin medication, but GI intolerance  Subsequent use of glimeparide, Tradjenta, basal insulin, Victoza, pioglitazone  2014. Began weight loss plan  5/2019. Discontinued pioglitazone, per plan  11/1/21. Stopped Victoza and changed to Ozempic     Current DM meds:  Ozempic 0.5 mg  Subcutaneous weekly     Using Accucheck BG meter              Tests 1-2x/day   Recent trends  mg/dl     Trends 109-170 mg/dl, overall avg 128 mg/dl    Previous [facility] labs include:          Recent  labs include:  Lab Results   Component Value Date    A1C 6.9 (H) 11/26/2021     11/26/2021    POTASSIUM 3.7 11/26/2021    CHLORIDE 98 11/26/2021    CO2 32 11/26/2021    ANIONGAP 3 11/26/2021     (H) 11/26/2021    BUN 21 11/26/2021    CR 0.80 11/26/2021    GFRESTIMATED 73 11/26/2021    GFRESTBLACK >90 08/28/2019    HUANG 9.0 11/26/2021    CHOL 154 11/26/2021    TRIG 81 11/26/2021    HDL 60 11/26/2021    LDL 78 11/26/2021    NHDL 94 11/26/2021    UCRR 38 11/26/2021    MICROL 7 11/26/2021    UMALCR 18.42 11/26/2021     Last eye exam with Dr. Matute 8/2021, no DR  DM Complications: none known        Lives in [facility]  Sees Dr. Tanmay Arriola/[facility] for general medicine evaluations.  Also sees Dr. Bush/[facility]       PMH/PSH:  Past Medical History: "   Diagnosis Date     Frontal fibrosing alopecia      Glaucoma      Hair loss      Hypertension      Sleep apnea     c-pap     Squamous cell carcinoma      Thyroid nodule      Type 2 diabetes mellitus (H)      Weight loss      Past Surgical History:   Procedure Laterality Date     BIOPSY OF SKIN LESION       CARPAL TUNNEL RELEASE RT/LT        SECTION       CHOLECYSTECTOMY       COLONOSCOPY N/A 3/19/2015    Procedure: COLONOSCOPY;  Surgeon: Ck Childerss MD;  Location: [facility]     Excision oral melanotic macule  2013    lower right lip     HC LAP,FULGURATE/EXCISE LESIONS      tumors removed and colon resection     KNEE SURGERY      arthroscopic- left knee     NO HISTORY OF SURGERY  13    derm     ORTHOPEDIC SURGERY      right and left knee replacements     RELEASE TRIGGER FINGER       RESECTION ABDOMINAL PERINEAL         Family Hx:  Family History   Problem Relation Age of Onset     Cancer No family hx of         No family history of skin cancer     Melanoma No family hx of      Skin Cancer No family hx of          Social Hx:  Social History     Socioeconomic History     Marital status:      Spouse name: Not on file     Number of children: Not on file     Years of education: Not on file     Highest education level: Not on file   Occupational History     Not on file   Tobacco Use     Smoking status: Never Smoker     Smokeless tobacco: Never Used   Substance and Sexual Activity     Alcohol use: No     Alcohol/week: 0.0 standard drinks     Drug use: No     Sexual activity: Not on file   Other Topics Concern     Parent/sibling w/ CABG, MI or angioplasty before 65F 55M? Not Asked   Social History Narrative     Not on file     Social Determinants of Health     Financial Resource Strain: Not on file   Food Insecurity: Not on file   Transportation Needs: Not on file   Physical Activity: Not on file   Stress: Not on file   Social Connections: Not on file   Intimate Partner Violence: Not on file    Housing Stability: Not on file          MEDICATIONS:  has a current medication list which includes the following prescription(s): aspirin, vitamin d3, diclofenac sodium, famotidine, fluticasone, latanoprost, losartan-hydrochlorothiazide, omeprazole, piroxicam, semaglutide, blood glucose, blood glucose monitoring, bd desire u/f, NONFORMULARY, and NONFORMULARY.       ROS: 10 point ROS neg other than the symptoms noted above in the HPI.     GENERAL: mild fatigue, weight loss as noted; denies fevers, chills, night sweats.   HEENT: no dysphagia, odonophagia, diplopia, neck pain  THYROID:  no apparent hyper or hypothyroid symptoms  CV: no chest pain, pressure, palpitations  LUNGS: no SOB, SIDHU, cough, wheezing   ABDOMEN: no diarrhea, constipation, abdominal pain  EXTREMITIES: cold extremities; no rashes, ulcers, edema  NEUROLOGY: no headaches, denies changes in vision, tingling, extremitiy numbness   MSK: less back pains, some arthralgias of knees; no muscle weakness  SKIN: scalp hair thinning; no rashes or lesions  :  no menses  PSYCH:  stable mood, no significant anxiety or depression  ENDOCRINE: no heat or cold intolerance     Physical Exam   VS: BP (!) 143/82   Pulse 81   Wt 68.6 kg (151 lb 4.8 oz)   BMI 26.17 kg/m    GENERAL: AXOX3, NAD, well dressed, answering questions appropriately, appears stated age.  ENT: no nose swelling or nasal discharge, mouth redness or gum changes.  EYES: eyes grossly normal to inspection, conjunctivae and sclerae normal, no exophthalmos or proptosis  THYROID:  symmetric, nontender, no goiter or thyroid nodules on exam  LUNGS: no audible wheeze, cough or visible cyanosis, no visible retractions or increased work of breathing  ABDOMEN: abdomen normal size  EXTREMITIES: good PT and radial pulses; no edema, no lesions  NEUROLOGY: CN grossly intact, no tremors  MSK: grossly intact  SKIN:  wearing wig; no apparent skin lesions, rash, or edema with visualized skin appearance  PSYCH:  mentation appears normal, affect normal/bright, judgement and insight intact,   normal speech and appearance well groomed    LABS:     All pertinent notes, labs, and images personally reviewed by me.     A/P:  Encounter Diagnosis   Name Primary?     Type 2 diabetes mellitus with microalbuminuria, without long-term current use of insulin (H) Yes       Comments:  Reviewed health history and diabetes issues.  Recent glycemic control very good, tolerating Ozempic med well  Reviewed and interpreted tests that I previously ordered.   Ordered appropriate tests for the endocrinology disease management.    Management options discussed and implemented after shared medical decision making with the patient.  T2DM problem is chronic-stable    Plan:  Discussed general issues with the diabetes diagnosis and management  We discussed the hgbA1c test which reflects previous overall glucose levels or control  Discussed the importance of blood glucose (BG) testing to assess glucose trends  Provided general overview of the diabetes medication options and medication treatment plan.     Recommend:  Reviewed diabetes medication treatment options  Advised dose reduction as Ozempic 0.25 mg subcutaneous weekly  Check FBG daily or every other day, goal target  mg/dl  Message me in 4-6 weeks with BG update, would consider stopping Ozempic at that time if good glycemic control  Sent new Accucheck Kelly plus TS's to [facility] in [place]  Plan repeat lab tests in 6/2022   Discussed nearby [facility] clinic   Lab orders placed  Monitor for symptom changes  Keep focus on diet, exercise, weight management.  Advise having fasting lipid panel testing and dilated eye examination, at least annually     Addressed patient questions today     There are no Patient Instructions on file for this visit.    Future labs ordered today:   Orders Placed This Encounter   Procedures     Basic metabolic panel     Hemoglobin A1c     Radiology/Consults ordered today:  None    Total time spent in with the patient evaluation:  18 min  Additional time spent reviewing pertinent lab tests and chart notes, and documentation:  5 min    Follow-up:  6/2022    CHANCE Thorne MD, MS  Endocrinology  [facility]                             Again, thank you for allowing me to participate in the care of your patient.        Sincerely,        Hector Thorne MD

## 2022-02-23 NOTE — PROGRESS NOTES
"Recent issues:  Diabetes follow-up evaluation  Feeling pretty well, success with weight loss on Livea (formerly Medifast) diet plan, now on Livea \"simple\" maintenance phase  Additional wt loss approx 15#, her goal wt 151-155#           1993. Diagnosis of diabetes mellitus  ...Details of initial evaluation not available, body wt near 215#  Initial treatment with diet management  1997. Began metformin medication, but GI intolerance  Subsequent use of glimeparide, Tradjenta, basal insulin, Victoza, pioglitazone  2014. Began weight loss plan  5/2019. Discontinued pioglitazone, per plan  11/1/21. Stopped Victoza and changed to Ozempic     Current DM meds:  Ozempic 0.5 mg  Subcutaneous weekly     Using Accucheck BG meter              Tests 1-2x/day   Recent trends  mg/dl     Trends 109-170 mg/dl, overall avg 128 mg/dl    Previous [facility] labs include:          Recent [facility] labs include:  Lab Results   Component Value Date    A1C 6.9 (H) 11/26/2021     11/26/2021    POTASSIUM 3.7 11/26/2021    CHLORIDE 98 11/26/2021    CO2 32 11/26/2021    ANIONGAP 3 11/26/2021     (H) 11/26/2021    BUN 21 11/26/2021    CR 0.80 11/26/2021    GFRESTIMATED 73 11/26/2021    GFRESTBLACK >90 08/28/2019    HUANG 9.0 11/26/2021    CHOL 154 11/26/2021    TRIG 81 11/26/2021    HDL 60 11/26/2021    LDL 78 11/26/2021    NHDL 94 11/26/2021    UCRR 38 11/26/2021    MICROL 7 11/26/2021    UMALCR 18.42 11/26/2021     Last eye exam with Dr. Matute 8/2021, no DR  DM Complications: none known        Lives in [facility]  Sees Dr. Tanmay [facility]ola/[facility] for general medicine evaluations.  Also sees Dr. Bush[facility]       PMH/PSH:  Past Medical History:   Diagnosis Date     Frontal fibrosing alopecia      Glaucoma      Hair loss      Hypertension      Sleep apnea     c-pap     Squamous cell carcinoma      Thyroid nodule      Type 2 diabetes mellitus (H)      Weight loss      Past Surgical History:   Procedure " Laterality Date     BIOPSY OF SKIN LESION       CARPAL TUNNEL RELEASE RT/LT        SECTION       CHOLECYSTECTOMY       COLONOSCOPY N/A 3/19/2015    Procedure: COLONOSCOPY;  Surgeon: Ck Childress MD;  Location: [facility]     Excision oral melanotic macule  2013    lower right lip     HC LAP,FULGURATE/EXCISE LESIONS      tumors removed and colon resection     KNEE SURGERY      arthroscopic- left knee     NO HISTORY OF SURGERY  11    derm     ORTHOPEDIC SURGERY      right and left knee replacements     RELEASE TRIGGER FINGER       RESECTION ABDOMINAL PERINEAL         Family Hx:  Family History   Problem Relation Age of Onset     Cancer No family hx of         No family history of skin cancer     Melanoma No family hx of      Skin Cancer No family hx of          Social Hx:  Social History     Socioeconomic History     Marital status:      Spouse name: Not on file     Number of children: Not on file     Years of education: Not on file     Highest education level: Not on file   Occupational History     Not on file   Tobacco Use     Smoking status: Never Smoker     Smokeless tobacco: Never Used   Substance and Sexual Activity     Alcohol use: No     Alcohol/week: 0.0 standard drinks     Drug use: No     Sexual activity: Not on file   Other Topics Concern     Parent/sibling w/ CABG, MI or angioplasty before 65F 55M? Not Asked   Social History Narrative     Not on file     Social Determinants of Health     Financial Resource Strain: Not on file   Food Insecurity: Not on file   Transportation Needs: Not on file   Physical Activity: Not on file   Stress: Not on file   Social Connections: Not on file   Intimate Partner Violence: Not on file   Housing Stability: Not on file          MEDICATIONS:  has a current medication list which includes the following prescription(s): aspirin, vitamin d3, diclofenac sodium, famotidine, fluticasone, latanoprost, losartan-hydrochlorothiazide, omeprazole,  piroxicam, semaglutide, blood glucose, blood glucose monitoring, bd desire u/f, NONFORMULARY, and NONFORMULARY.       ROS: 10 point ROS neg other than the symptoms noted above in the HPI.     GENERAL: mild fatigue, weight loss as noted; denies fevers, chills, night sweats.   HEENT: no dysphagia, odonophagia, diplopia, neck pain  THYROID:  no apparent hyper or hypothyroid symptoms  CV: no chest pain, pressure, palpitations  LUNGS: no SOB, SIDHU, cough, wheezing   ABDOMEN: no diarrhea, constipation, abdominal pain  EXTREMITIES: cold extremities; no rashes, ulcers, edema  NEUROLOGY: no headaches, denies changes in vision, tingling, extremitiy numbness   MSK: less back pains, some arthralgias of knees; no muscle weakness  SKIN: scalp hair thinning; no rashes or lesions  :  no menses  PSYCH:  stable mood, no significant anxiety or depression  ENDOCRINE: no heat or cold intolerance     Physical Exam   VS: BP (!) 143/82   Pulse 81   Wt 68.6 kg (151 lb 4.8 oz)   BMI 26.17 kg/m    GENERAL: AXOX3, NAD, well dressed, answering questions appropriately, appears stated age.  ENT: no nose swelling or nasal discharge, mouth redness or gum changes.  EYES: eyes grossly normal to inspection, conjunctivae and sclerae normal, no exophthalmos or proptosis  THYROID:  symmetric, nontender, no goiter or thyroid nodules on exam  LUNGS: no audible wheeze, cough or visible cyanosis, no visible retractions or increased work of breathing  ABDOMEN: abdomen normal size  EXTREMITIES: good PT and radial pulses; no edema, no lesions  NEUROLOGY: CN grossly intact, no tremors  MSK: grossly intact  SKIN:  wearing wig; no apparent skin lesions, rash, or edema with visualized skin appearance  PSYCH: mentation appears normal, affect normal/bright, judgement and insight intact,   normal speech and appearance well groomed    LABS:     All pertinent notes, labs, and images personally reviewed by me.     A/P:  Encounter Diagnosis   Name Primary?     Type 2  diabetes mellitus with microalbuminuria, without long-term current use of insulin (H) Yes       Comments:  Reviewed health history and diabetes issues.  Recent glycemic control very good, tolerating Ozempic med well  Reviewed and interpreted tests that I previously ordered.   Ordered appropriate tests for the endocrinology disease management.    Management options discussed and implemented after shared medical decision making with the patient.  T2DM problem is chronic-stable    Plan:  Discussed general issues with the diabetes diagnosis and management  We discussed the hgbA1c test which reflects previous overall glucose levels or control  Discussed the importance of blood glucose (BG) testing to assess glucose trends  Provided general overview of the diabetes medication options and medication treatment plan.     Recommend:  Reviewed diabetes medication treatment options  Advised dose reduction as Ozempic 0.25 mg subcutaneous weekly  Check FBG daily or every other day, goal target  mg/dl  Message me in 4-6 weeks with BG update, would consider stopping Ozempic at that time if good glycemic control  Sent new Accucheck Kelly plus TS's to [facility] in [place]  Plan repeat lab tests in 6/2022   Discussed nearby [facility] clinic   Lab orders placed  Monitor for symptom changes  Keep focus on diet, exercise, weight management.  Advise having fasting lipid panel testing and dilated eye examination, at least annually     Addressed patient questions today     There are no Patient Instructions on file for this visit.    Future labs ordered today:   Orders Placed This Encounter   Procedures     Basic metabolic panel     Hemoglobin A1c     Radiology/Consults ordered today: None    Total time spent in with the patient evaluation:  18 min  Additional time spent reviewing pertinent lab tests and chart notes, and documentation:  5 min    Follow-up:  6/2022    CHANCE Thorne MD, MS  Endocrinology  [facility]

## 2022-04-14 ENCOUNTER — OFFICE VISIT (OUTPATIENT)
Dept: DERMATOLOGY | Facility: CLINIC | Age: 72
End: 2022-04-14
Payer: MEDICARE

## 2022-04-14 DIAGNOSIS — L81.4 LENTIGINES: ICD-10-CM

## 2022-04-14 DIAGNOSIS — L57.0 ACTINIC KERATOSIS: ICD-10-CM

## 2022-04-14 DIAGNOSIS — Z85.828 HISTORY OF NONMELANOMA SKIN CANCER: Primary | ICD-10-CM

## 2022-04-14 DIAGNOSIS — L66.12 FRONTAL FIBROSING ALOPECIA: ICD-10-CM

## 2022-04-14 DIAGNOSIS — D18.01 CHERRY ANGIOMA: ICD-10-CM

## 2022-04-14 DIAGNOSIS — L82.1 SEBORRHEIC KERATOSIS: ICD-10-CM

## 2022-04-14 DIAGNOSIS — D22.9 MULTIPLE BENIGN NEVI: ICD-10-CM

## 2022-04-14 PROCEDURE — 99214 OFFICE O/P EST MOD 30 MIN: CPT | Mod: 25 | Performed by: PHYSICIAN ASSISTANT

## 2022-04-14 PROCEDURE — 17003 DESTRUCT PREMALG LES 2-14: CPT | Performed by: PHYSICIAN ASSISTANT

## 2022-04-14 PROCEDURE — 17000 DESTRUCT PREMALG LESION: CPT | Performed by: PHYSICIAN ASSISTANT

## 2022-04-14 ASSESSMENT — PAIN SCALES - GENERAL: PAINLEVEL: NO PAIN (0)

## 2022-04-14 NOTE — NURSING NOTE
Dermatology Rooming Note    Aleja Gudino's goals for this visit include:   Chief Complaint   Patient presents with     Skin Check     States side of right eye has a spot that has returned. States on the bridge of her nose is hurting. Also states she is concerned about some brown spots on her face. Two spots on her back are brown and itching. States there are some red marks on her hands. Also states she wants a new prescription for her wigs.      Buffy Henry, Visit Facilitator

## 2022-04-14 NOTE — LETTER
4/14/2022       RE: Aleja Gudino  [address]  [facility] [place] [address]     Dear Colleague,    Thank you for referring your patient, Aleja Gudino, to the [facility] [place] at [facility]. Please see a copy of my visit note below.    [facility] Dermatology Note  Encounter Date: Apr 14, 2022  Office Visit     Dermatology Problem List:  1. Hx of SCC, left thigh s/p removed in ~2000s  2. Hx of sebaceous hyperplasia,  - left cheek s/p bx 6/18/13  - right nasal dorsum - s/p bx 4/12/21  3. 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 AKs s/p cryo  - Monitor AK on R temple for recurrence, s/p cryo 4/14/22, monitor for recurrence  5. ISK s/p cryo  7. Benign biopsies  - Intradermal melanocytic nevus and ruptured folliculitis, right nasal sidewall s/p bx 10/25/2021   ____________________________________________    Assessment & Plan:    # AK, R temple x1 and nasal bridge x1  Patient reports that the lesion on the R temple was previously treated with cryotherapy and has since grown back. Will plan to biopsy at the next visit    - Cryotherapy today, see procedure note below    # Frontal fibrosing alopecia, stable, no sx currently   - Refill provided on scalp prosthesis    # History of NMSC. No evidence of recurrent disease.  - Continue photoprotection - recommend SPF 30 or higher with frequent reapplication  - Continue yearly skin exams  - Advised to monitor for changing, non-healing, bleeding, painful, changing, or otherwise symptomatic lesions    # Multiple benign nevi. Solar lentigines.   - No concerning lesions today  - Monitor for ABCDEs of melanoma   - Continue sun protection - recommend SPF 30 or higher with frequent application   - Return  sooner if noticing changing or symptomatic lesions    # Seborrheic keratoses. Cherry angiomas.   Discussed the natural history and benign nature of this lesion. Reassurance provided that no additional treatment is necessary.     Procedures Performed:   - Cryotherapy procedure note, location(s): See above. After verbal consent and discussion of risks and benefits including, but not limited to, dyspigmentation/scar, blister, and pain, 2 lesion(s) was(were) treated with 1-2 mm freeze border for 1-2 cycles with liquid nitrogen. Post cryotherapy instructions were provided.    Follow-up: 1 year(s) in-person, or earlier for new or changing lesions    Staff and Scribe:     Scribe Disclosure:  I, Richelle Addison, am serving as a scribe to document services personally performed by Kelly Sanchez PA-C based on data collection and the provider's statements to me.     Provider Disclosure:   The documentation recorded by the scribe accurately reflects the services I personally performed and the decisions made by me.    All risks, benefits and alternatives were discussed with patient.  Patient is in agreement and understands the assessment and plan.  All questions were answered.    Kelly Sanchez PA-C, MPAS  [facility]: Phone: [phone], Fax: [phone]  [facility]: Phone: [phone],  Fax: [phone]  [facility]: Phone: [phone], Fax: [phone]  ____________________________________________    CC: Skin Check (States side of right eye has a spot that has returned. States on the bridge of her nose is hurting. Also states she is concerned about some brown spots on her face. Two spots on her back are brown and itching. States there are some red marks on her hands. Also states she wants a new prescription for her wigs. )    HPI:  Ms. Aleja Gudino is a(n) 71 year old female who presents today as a return  patient for [facility]. Last seen in dermatology by Ita Dillard PA-C on 10/25/21 at which point 2 AKs on the tip of nose and R cheek were treated with cryo. Additionally, a lesion biopsied on the R nasal sidewall returned as an intradermal melanocytic nevus and ruptured folliculitis.     Today, the patient reports that her scalp symptoms have remained at bay. She requests a refill of her scalp prosthesis. She also points to a few new lesions on her back that she was concerned about. Patient is otherwise feeling well, without additional skin concerns.    Labs Reviewed:  N/A    Physical Exam:  Vitals: There were no vitals taken for this visit.  SKIN: Total skin excluding the undergarment areas was performed. The exam included the head/face, neck, both arms, chest, back, abdomen, both legs, digits and/or nails.   - Ward I  - No erythema on the scalp. Perifollicular tufting. Very mild scaling. frontal hairline is receded  - Eyelashes normal. No hair fibers along eyebrows.   - Well healed scar on the L thigh at site of previous NMSC. No evidence of recurrence.   - There are erythematous macules with overyling adherent scale on the R temple x1 and nasal bridge x1.   - Multiple regular brown pigmented macules and papules are identified on the trunk and extremities.   - Scattered brown macules on sun exposed areas..   - There are dome shaped bright red papules on the trunk and extremities.   - There are waxy stuck on tan to brown papules on the trunk and extremities.   - No other lesions of concern on areas examined.     Medications:  Current Outpatient Medications   Medication     NONFORMULARY     aspirin 81 MG tablet     blood glucose (NO BRAND SPECIFIED) test strip     blood glucose monitoring (ACCU-CHEK FASTCLIX) lancets     Cholecalciferol (VITAMIN D) 2000 UNITS tablet     diclofenac sodium 3 % GEL     famotidine (PEPCID) 40 MG tablet     fluticasone (FLONASE) 50 MCG/ACT nasal spray     insulin pen needle (BD  YURIY U/F) 32G X 4 MM miscellaneous     latanoprost (XALATAN) 0.005 % ophthalmic solution     losartan-hydrochlorothiazide (HYZAAR) 100-12.5 MG tablet     NONFORMULARY     omeprazole (PRILOSEC) 20 MG CR capsule     piroxicam (FELDENE) 10 MG capsule     semaglutide (OZEMPIC) 2 MG/1.5ML SOPN pen     No current facility-administered medications for this visit.      Past Medical History:   Patient Active Problem List   Diagnosis     Cicatricial alopecia     Actinic keratosis     Dermatitis     Frontal fibrosing alopecia     Dermatitis, seborrheic     Elevated blood pressure reading     Allergic rhinitis     Breast microcalcifications     Carpal tunnel syndrome     Esophageal reflux     History of bowel resection     HTN (hypertension)     Ingrowing nail     Irritable bowel syndrome     Low back pain radiating to left lower extremity     Obstructive sleep apnea     Osteoarthritis     Other atopic dermatitis and related conditions     Overweight     Pain in joint, shoulder region     Postmenopausal atrophic vaginitis     Primary open angle glaucoma     Encounter for screening for osteoporosis     Thyroid nodule     Type 2 diabetes mellitus without complication, without long-term current use of insulin (H)     Asthma     Essential hypertension     Vitamin D deficiency     History of thyroid nodule     Past Medical History:   Diagnosis Date     Frontal fibrosing alopecia      Glaucoma      Hair loss      Hypertension      Sleep apnea     c-pap     Squamous cell carcinoma      Thyroid nodule      Type 2 diabetes mellitus (H)      Weight loss         CC [place] [address] on close of this encounter.

## 2022-04-14 NOTE — PATIENT INSTRUCTIONS
Patient Education     Checking for Skin Cancer  You can find cancer early by checking your skin each month. There are 3 kinds of skin cancer. They are melanoma, basal cell carcinoma, and squamous cell carcinoma. Doing monthly skin checks is the best way to find new marks or skin changes. Follow the instructions below for checking your skin.   The ABCDEs of checking moles for melanoma   Check your moles or growths for signs of melanoma using ABCDE:   Asymmetry: the sides of the mole or growth don t match  Border: the edges are ragged, notched, or blurred  Color: the color within the mole or growth varies  Diameter: the mole or growth is larger than 6 mm (size of a pencil eraser)  Evolving: the size, shape, or color of the mole or growth is changing (evolving is not shown in the images below)    Checking for other types of skin cancer  Basal cell carcinoma or squamous cell carcinoma have symptoms such as:     A spot or mole that looks different from all other marks on your skin  Changes in how an area feels, such as itching, tenderness, or pain  Changes in the skin's surface, such as oozing, bleeding, or scaliness  A sore that does not heal  New swelling or redness beyond the border of a mole    Who s at risk?  Anyone can get skin cancer. But you are at greater risk if you have:   Fair skin, light-colored hair, or light-colored eyes  Many moles or abnormal moles on your skin  A history of sunburns from sunlight or tanning beds  A family history of skin cancer  A history of exposure to radiation or chemicals  A weakened immune system  If you have had skin cancer in the past, you are at risk for recurring skin cancer.   How to check your skin  Do your monthly skin checkups in front of a full-length mirror. Check all parts of your body, including your:   Head (ears, face, neck, and scalp)  Torso (front, back, and sides)  Arms (tops, undersides, upper, and lower armpits)  Hands (palms, backs, and fingers, including  under the nails)  Buttocks and genitals  Legs (front, back, and sides)  Feet (tops, soles, toes, including under the nails, and between toes)  If you have a lot of moles, take digital photos of them each month. Make sure to take photos both up close and from a distance. These can help you see if any moles change over time.   Most skin changes are not cancer. But if you see any changes in your skin, call your doctor right away. Only he or she can diagnose a problem. If you have skin cancer, seeing your doctor can be the first step toward getting the treatment that could save your life.   [employer] last reviewed this educational content on 4/1/2019 2000-2020 [employer]. [address] [place] [address], [place] [address]. All rights reserved. This information is not intended as a substitute for professional medical care. Always follow your healthcare professional's instructions.       When should I call my doctor?  If you are worsening or not improving, please, contact us or seek urgent care as noted below.     Who should I call with questions (adults)?  [facility] (adult and pediatric): [phone]  [facility] (adult): [phone]  For urgent needs outside of business hours call the [facility] at [phone] and ask for the dermatology resident on call to be paged  If this is a medical emergency and you are unable to reach an ER, Call [phone]    Who should I call with questions (pediatric)?  [facility]- Pediatric Dermatology  Dr. Alicia Carrero, Dr. Aliya Woodruff, Dr. Kia Chairez, MONIQUE Flores, Dr. Janey Mcguire, Dr. Zoie Bush & Dr. Kalin Saavedra  Non-urgent nurse triage line; [phone]- Xochilt and Radha FIN[facility] Care Coordinatortika Coyne (/Complex ) [phone]    If you need a prescription refill, please contact your pharmacy. Refills are approved or denied by our  Physicians during normal business hours, Monday through Fridays  Per office policy, refills will not be granted if you have not been seen within the past year (or sooner depending on your child's condition)    Scheduling Information:  Pediatric Appointment Scheduling and Call Center [phone]  Radiology Scheduling- [phone]  Sedation Unit Scheduling- [phone]  [facility] Scheduling- General [phone]; Pediatric Dermatology [phone]  Main  Services: [phone]  Portuguese: [phone]  Guinean: [phone]  Hmong/Dutch/Slovenian: [phone]  Preadmission Nursing Department Fax Number: [phone] (Fax all pre-operative paperwork to this number)    For urgent matters arising during evenings, weekends, or holidays that cannot wait for normal business hours please call [phone] and ask for the dermatology resident on call to be paged.       Cryotherapy    What is it?  Use of a very cold liquid, such as liquid nitrogen, to freeze and destroy abnormal skin cells that need to be removed    What should I expect?  Tenderness and redness  A small blister that might grow and fill with dark purple blood. There may be crusting.  More than one treatment may be needed if the lesions do not go away.    How do I care for the treated area?  Gently wash the area with your hands when bathing.  Use a thin layer of Vaseline to help with healing. You may use a Band-Aid.   The area should heal within 7-10 days and may leave behind a pink or lighter color.   Do not use an antibiotic or Neosporin ointment.   You may take acetaminophen (Tylenol) for pain.     Call your doctor if you have:  Severe pain  Signs of infection (warmth, redness, cloudy yellow drainage, and or a bad smell)  Questions or concerns    Who should I call with questions?      [facility]: [phone]      [facility]: [phone]      For urgent needs outside of business  hours call the [facility] at [phone] and ask for the dermatology resident on call

## 2022-04-14 NOTE — PROGRESS NOTES
[facility] Dermatology Note  Encounter Date: Apr 14, 2022  Office Visit     Dermatology Problem List:  1. Hx of SCC, left thigh s/p removed in ~2000s  2. Hx of sebaceous hyperplasia,  - left cheek s/p bx 6/18/13  - right nasal dorsum - s/p bx 4/12/21  3. 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 AKs s/p cryo  - Monitor AK on R temple for recurrence, s/p cryo 4/14/22, monitor for recurrence  5. ISK s/p cryo  7. Benign biopsies  - Intradermal melanocytic nevus and ruptured folliculitis, right nasal sidewall s/p bx 10/25/2021   ____________________________________________    Assessment & Plan:    # AK, R temple x1 and nasal bridge x1  Patient reports that the lesion on the R temple was previously treated with cryotherapy and has since grown back. Will plan to biopsy at the next visit    - Cryotherapy today, see procedure note below    # Frontal fibrosing alopecia, stable, no sx currently   - Refill provided on scalp prosthesis    # History of NMSC. No evidence of recurrent disease.  - Continue photoprotection - recommend SPF 30 or higher with frequent reapplication  - Continue yearly skin exams  - Advised to monitor for changing, non-healing, bleeding, painful, changing, or otherwise symptomatic lesions    # Multiple benign nevi. Solar lentigines.   - No concerning lesions today  - Monitor for ABCDEs of melanoma   - Continue sun protection - recommend SPF 30 or higher with frequent application   - Return sooner if noticing changing or symptomatic lesions    # Seborrheic keratoses. Cherry angiomas.   Discussed the natural history and benign nature of this lesion. Reassurance provided that no additional treatment is necessary.     Procedures Performed:   - Cryotherapy procedure note, location(s): See above. After verbal  consent and discussion of risks and benefits including, but not limited to, dyspigmentation/scar, blister, and pain, 2 lesion(s) was(were) treated with 1-2 mm freeze border for 1-2 cycles with liquid nitrogen. Post cryotherapy instructions were provided.    Follow-up: 1 year(s) in-person, or earlier for new or changing lesions    Staff and Scribe:     Scribe Disclosure:  I, Richelle Addison, am serving as a scribe to document services personally performed by Kelly Sanchez PA-C based on data collection and the provider's statements to me.     Provider Disclosure:   The documentation recorded by the scribe accurately reflects the services I personally performed and the decisions made by me.    All risks, benefits and alternatives were discussed with patient.  Patient is in agreement and understands the assessment and plan.  All questions were answered.    Kelly Sanchez PA-C, [facility] [place]  [facility]: Phone: [phone], Fax: [phone]  [facility]: Phone: [phone],  Fax: [phone]  [facility]: Phone: [phone], Fax: [phone]  ____________________________________________    CC: Skin Check (States side of right eye has a spot that has returned. States on the bridge of her nose is hurting. Also states she is concerned about some brown spots on her face. Two spots on her back are brown and itching. States there are some red marks on her hands. Also states she wants a new prescription for her wigs. )    HPI:  Ms. Aelja Gudino is a(n) 71 year old female who presents today as a return patient for [facility]. Last seen in dermatology by Ita Dillard PA-C on 10/25/21 at which point 2 AKs on the tip of nose and R cheek were treated with cryo. Additionally, a lesion biopsied on the R nasal sidewall returned as an intradermal melanocytic nevus and ruptured folliculitis.     Today, the patient reports that her  scalp symptoms have remained at bay. She requests a refill of her scalp prosthesis. She also points to a few new lesions on her back that she was concerned about. Patient is otherwise feeling well, without additional skin concerns.    Labs Reviewed:  N/A    Physical Exam:  Vitals: There were no vitals taken for this visit.  SKIN: Total skin excluding the undergarment areas was performed. The exam included the head/face, neck, both arms, chest, back, abdomen, both legs, digits and/or nails.   - Ward I  - No erythema on the scalp. Perifollicular tufting. Very mild scaling. frontal hairline is receded  - Eyelashes normal. No hair fibers along eyebrows.   - Well healed scar on the L thigh at site of previous NMSC. No evidence of recurrence.   - There are erythematous macules with overyling adherent scale on the R temple x1 and nasal bridge x1.   - Multiple regular brown pigmented macules and papules are identified on the trunk and extremities.   - Scattered brown macules on sun exposed areas..   - There are dome shaped bright red papules on the trunk and extremities.   - There are waxy stuck on tan to brown papules on the trunk and extremities.   - No other lesions of concern on areas examined.     Medications:  Current Outpatient Medications   Medication     NONFORMULARY     aspirin 81 MG tablet     blood glucose (NO BRAND SPECIFIED) test strip     blood glucose monitoring (ACCU-CHEK FASTCLIX) lancets     Cholecalciferol (VITAMIN D) 2000 UNITS tablet     diclofenac sodium 3 % GEL     famotidine (PEPCID) 40 MG tablet     fluticasone (FLONASE) 50 MCG/ACT nasal spray     insulin pen needle (BD YURIY U/F) 32G X 4 MM miscellaneous     latanoprost (XALATAN) 0.005 % ophthalmic solution     losartan-hydrochlorothiazide (HYZAAR) 100-12.5 MG tablet     NONFORMULARY     omeprazole (PRILOSEC) 20 MG CR capsule     piroxicam (FELDENE) 10 MG capsule     semaglutide (OZEMPIC) 2 MG/1.5ML SOPN pen     No current  facility-administered medications for this visit.      Past Medical History:   Patient Active Problem List   Diagnosis     Cicatricial alopecia     Actinic keratosis     Dermatitis     Frontal fibrosing alopecia     Dermatitis, seborrheic     Elevated blood pressure reading     Allergic rhinitis     Breast microcalcifications     Carpal tunnel syndrome     Esophageal reflux     History of bowel resection     HTN (hypertension)     Ingrowing nail     Irritable bowel syndrome     Low back pain radiating to left lower extremity     Obstructive sleep apnea     Osteoarthritis     Other atopic dermatitis and related conditions     Overweight     Pain in joint, shoulder region     Postmenopausal atrophic vaginitis     Primary open angle glaucoma     Encounter for screening for osteoporosis     Thyroid nodule     Type 2 diabetes mellitus without complication, without long-term current use of insulin (H)     Asthma     Essential hypertension     Vitamin D deficiency     History of thyroid nodule     Past Medical History:   Diagnosis Date     Frontal fibrosing alopecia      Glaucoma      Hair loss      Hypertension      Sleep apnea     c-pap     Squamous cell carcinoma      Thyroid nodule      Type 2 diabetes mellitus (H)      Weight loss         CC Tanmay Arriola MD  [address] [address] on close of this encounter.

## 2022-04-15 ENCOUNTER — MYC MEDICAL ADVICE (OUTPATIENT)
Dept: ENDOCRINOLOGY | Facility: CLINIC | Age: 72
End: 2022-04-15
Payer: MEDICARE

## 2022-04-23 DIAGNOSIS — E11.29 TYPE 2 DIABETES MELLITUS WITH MICROALBUMINURIA, WITHOUT LONG-TERM CURRENT USE OF INSULIN (H): ICD-10-CM

## 2022-04-23 DIAGNOSIS — R80.9 TYPE 2 DIABETES MELLITUS WITH MICROALBUMINURIA, WITHOUT LONG-TERM CURRENT USE OF INSULIN (H): ICD-10-CM

## 2022-04-25 RX ORDER — SEMAGLUTIDE 1.34 MG/ML
INJECTION, SOLUTION SUBCUTANEOUS
Qty: 4.5 ML | Refills: 1 | Status: SHIPPED | OUTPATIENT
Start: 2022-04-25 | End: 2023-03-30

## 2022-06-04 ENCOUNTER — HEALTH MAINTENANCE LETTER (OUTPATIENT)
Age: 72
End: 2022-06-04

## 2022-06-15 ENCOUNTER — LAB (OUTPATIENT)
Dept: LAB | Facility: CLINIC | Age: 72
End: 2022-06-15
Payer: MEDICARE

## 2022-06-15 DIAGNOSIS — E11.29 TYPE 2 DIABETES MELLITUS WITH MICROALBUMINURIA, WITHOUT LONG-TERM CURRENT USE OF INSULIN (H): ICD-10-CM

## 2022-06-15 DIAGNOSIS — R80.9 TYPE 2 DIABETES MELLITUS WITH MICROALBUMINURIA, WITHOUT LONG-TERM CURRENT USE OF INSULIN (H): ICD-10-CM

## 2022-06-15 LAB — HBA1C MFR BLD: 6.3 % (ref 0–5.6)

## 2022-06-15 PROCEDURE — 83036 HEMOGLOBIN GLYCOSYLATED A1C: CPT

## 2022-06-15 PROCEDURE — 36415 COLL VENOUS BLD VENIPUNCTURE: CPT

## 2022-06-15 PROCEDURE — 80048 BASIC METABOLIC PNL TOTAL CA: CPT

## 2022-06-16 LAB
ANION GAP SERPL CALCULATED.3IONS-SCNC: 6 MMOL/L (ref 3–14)
BUN SERPL-MCNC: 22 MG/DL (ref 7–30)
CALCIUM SERPL-MCNC: 9.1 MG/DL (ref 8.5–10.1)
CHLORIDE BLD-SCNC: 98 MMOL/L (ref 94–109)
CO2 SERPL-SCNC: 28 MMOL/L (ref 20–32)
CREAT SERPL-MCNC: 0.87 MG/DL (ref 0.52–1.04)
GFR SERPL CREATININE-BSD FRML MDRD: 70 ML/MIN/1.73M2
GLUCOSE BLD-MCNC: 128 MG/DL (ref 70–99)
POTASSIUM BLD-SCNC: 4.3 MMOL/L (ref 3.4–5.3)
SODIUM SERPL-SCNC: 132 MMOL/L (ref 133–144)

## 2022-06-22 ENCOUNTER — OFFICE VISIT (OUTPATIENT)
Dept: ENDOCRINOLOGY | Facility: CLINIC | Age: 72
End: 2022-06-22
Payer: MEDICARE

## 2022-06-22 VITALS
SYSTOLIC BLOOD PRESSURE: 130 MMHG | OXYGEN SATURATION: 99 % | DIASTOLIC BLOOD PRESSURE: 83 MMHG | WEIGHT: 148.7 LBS | BODY MASS INDEX: 25.72 KG/M2 | HEART RATE: 82 BPM

## 2022-06-22 DIAGNOSIS — E11.29 TYPE 2 DIABETES MELLITUS WITH MICROALBUMINURIA, WITHOUT LONG-TERM CURRENT USE OF INSULIN (H): Primary | ICD-10-CM

## 2022-06-22 DIAGNOSIS — R80.9 TYPE 2 DIABETES MELLITUS WITH MICROALBUMINURIA, WITHOUT LONG-TERM CURRENT USE OF INSULIN (H): Primary | ICD-10-CM

## 2022-06-22 PROCEDURE — 99214 OFFICE O/P EST MOD 30 MIN: CPT | Performed by: INTERNAL MEDICINE

## 2022-06-22 NOTE — PROGRESS NOTES
"Recent issues:  Diabetes follow-up evaluation  Feeling pretty well, previous success with weight loss on Livea (formerly Medifast) diet plan, now on Livea \"simple\" maintenance phase  Additional wt loss, now below her goal wt 151-155#  Continues on the lower dose Ozempic 0.25 mg weekly            1993. Diagnosis of diabetes mellitus  ...Details of initial evaluation not available, body wt near 215#  Initial treatment with diet management  1997. Began metformin medication, but GI intolerance  Subsequent use of glimeparide, Tradjenta, basal insulin, Victoza, pioglitazone  2014. Began weight loss plan  5/2019. Discontinued pioglitazone, per plan  11/1/21. Stopped Victoza and changed to Ozempic     Current DM meds:  Ozempic 0.25 mg  Subcutaneous weekly     Using Accucheck BG meter              Tests 1-2x/day   Trends  mg/dl, overall avg 128 mg/dl    Previous [facility] labs include:          Recent [facility] labs include:  Lab Results   Component Value Date    A1C 6.3 (H) 06/15/2022     (L) 06/15/2022    POTASSIUM 4.3 06/15/2022    CHLORIDE 98 06/15/2022    CO2 28 06/15/2022    ANIONGAP 6 06/15/2022     (H) 06/15/2022    BUN 22 06/15/2022    CR 0.87 06/15/2022    GFRESTIMATED 70 06/15/2022    GFRESTBLACK >90 08/28/2019    HUANG 9.1 06/15/2022    CHOL 154 11/26/2021    TRIG 81 11/26/2021    HDL 60 11/26/2021    LDL 78 11/26/2021    NHDL 94 11/26/2021    UCRR 38 11/26/2021    MICROL 7 11/26/2021    UMALCR 18.42 11/26/2021     Lab Results   Component Value Date    TSH 1.85 11/26/2021     Last eye exam with Dr. Matute 8/2021, no DR  DM Complications: none known        Lives in [facility]  Sees Dr. Tanmay Arriola/[facility] for general medicine evaluations.  Also sees Dr. Bush/[facility]       PMH/PSH:  Past Medical History:   Diagnosis Date     Frontal fibrosing alopecia      Glaucoma      Hair loss      Hypertension      Sleep apnea     c-pap     Squamous cell carcinoma      Thyroid nodule "      Type 2 diabetes mellitus (H)      Weight loss      Past Surgical History:   Procedure Laterality Date     BIOPSY OF SKIN LESION       CARPAL TUNNEL RELEASE RT/LT        SECTION       CHOLECYSTECTOMY       COLONOSCOPY N/A 3/19/2015    Procedure: COLONOSCOPY;  Surgeon: Ck Childress MD;  Location: [facility]     Excision oral melanotic macule  2013    lower right lip     HC LAP,FULGURATE/EXCISE LESIONS      tumors removed and colon resection     KNEE SURGERY      arthroscopic- left knee     NO HISTORY OF SURGERY  13    derm     ORTHOPEDIC SURGERY      right and left knee replacements     RELEASE TRIGGER FINGER       RESECTION ABDOMINAL PERINEAL         Family Hx:  Family History   Problem Relation Age of Onset     Skin Cancer Father      Cancer No family hx of         No family history of skin cancer     Melanoma No family hx of          Social Hx:  Social History     Socioeconomic History     Marital status:      Spouse name: Not on file     Number of children: Not on file     Years of education: Not on file     Highest education level: Not on file   Occupational History     Not on file   Tobacco Use     Smoking status: Never Smoker     Smokeless tobacco: Never Used   Substance and Sexual Activity     Alcohol use: No     Alcohol/week: 0.0 standard drinks     Drug use: No     Sexual activity: Not on file   Other Topics Concern     Parent/sibling w/ CABG, MI or angioplasty before 65F 55M? Not Asked   Social History Narrative     Not on file     Social Determinants of Health     Financial Resource Strain: Not on file   Food Insecurity: Not on file   Transportation Needs: Not on file   Physical Activity: Not on file   Stress: Not on file   Social Connections: Not on file   Intimate Partner Violence: Not on file   Housing Stability: Not on file          MEDICATIONS:  has a current medication list which includes the following prescription(s): aspirin, blood glucose, blood glucose  monitoring, vitamin d3, diclofenac sodium, famotidine, fluticasone, latanoprost, losartan-hydrochlorothiazide, NONFORMULARY, NONFORMULARY, omeprazole, ozempic (0.25 or 0.5 mg/dose), piroxicam, and bd desire u/f.       ROS: 10 point ROS neg other than the symptoms noted above in the HPI.     GENERAL: mild fatigue, weight loss as noted; denies fevers, chills, night sweats.   HEENT: no dysphagia, odonophagia, diplopia, neck pain  THYROID:  no apparent hyper or hypothyroid symptoms  CV: no chest pain, pressure, palpitations  LUNGS: no SOB, SIDHU, cough, wheezing   ABDOMEN: no diarrhea, constipation, abdominal pain  EXTREMITIES: cold extremities; no rashes, ulcers, edema  NEUROLOGY: no headaches, denies changes in vision, tingling, extremitiy numbness   MSK: less back pains, some arthralgias of knees; no muscle weakness  SKIN: scalp hair thinning; no rashes or lesions  :  no menses  PSYCH:  stable mood, no significant anxiety or depression  ENDOCRINE: no heat or cold intolerance     Physical Exam   VS: /83   Pulse 82   Wt 67.4 kg (148 lb 11.2 oz)   SpO2 99%   BMI 25.72 kg/m    GENERAL: AXOX3, NAD, well dressed, answering questions appropriately, appears stated age.  ENT: no nose swelling or nasal discharge, mouth redness or gum changes.  EYES: eyes grossly normal to inspection, conjunctivae and sclerae normal, no exophthalmos or proptosis  THYROID:  symmetric, nontender, no goiter or thyroid nodules on exam  LUNGS: no audible wheeze, cough or visible cyanosis, no visible retractions or increased work of breathing  ABDOMEN: abdomen normal size  EXTREMITIES: normal feet exam, no edema, no lesions  NEUROLOGY: CN grossly intact, no tremors  MSK: grossly intact  SKIN:  wearing wig; no apparent skin lesions, rash, or edema with visualized skin appearance  PSYCH: mentation appears normal, affect normal/bright, judgement and insight intact,   normal speech and appearance well groomed    LABS:     All pertinent notes,  labs, and images personally reviewed by me.     A/P:  Encounter Diagnosis   Name Primary?     Type 2 diabetes mellitus with microalbuminuria, without long-term current use of insulin (H) Yes       Comments:  Reviewed health history and diabetes issues.  Recent glycemic control very good, tolerating Ozempic med well  Reviewed and interpreted tests that I previously ordered.   Ordered appropriate tests for the endocrinology disease management.    Management options discussed and implemented after shared medical decision making with the patient.  T2DM problem is chronic-stable    Plan:  Discussed general issues with the diabetes diagnosis and management  We discussed the hgbA1c test which reflects previous overall glucose levels or control  Discussed the importance of blood glucose (BG) testing to assess glucose trends  Provided general overview of the diabetes medication options and medication treatment plan.     Recommend:  Reviewed diabetes medication treatment options  She prefers to continue the low dose Ozempic plan vs discontinuing T2DM med treatment  Continue the current Ozempic 0.25 mg subcutaneous weekly  Check FBG daily or every other day, goal target  mg/dl  Monitor for symptom changes  Plan repeat lab tests in 12/2022   Discussed nearby [facility] clinic   Lab orders placed  Keep focus on diet, exercise, weight management.  Advise having fasting lipid panel testing and dilated eye examination, at least annually     Agree with her plan for the f/u knee orthopedic eval at [facility]  Addressed patient questions today     There are no Patient Instructions on file for this visit.    Future labs ordered today:   Orders Placed This Encounter   Procedures     Hemoglobin A1c     Basic metabolic panel     TSH     Lipid panel reflex to direct LDL Fasting     Albumin Random Urine Quantitative with Creat Ratio     Radiology/Consults ordered today: None    Total time spent in with the patient evaluation:  12  min  Additional time spent reviewing pertinent lab tests and chart notes, and documentation:  9 min    Follow-up:  12/2022, Return    CHANCE Thorne MD, MS  Endocrinology  [facility]

## 2022-10-10 ENCOUNTER — HEALTH MAINTENANCE LETTER (OUTPATIENT)
Age: 72
End: 2022-10-10

## 2022-11-29 ENCOUNTER — LAB (OUTPATIENT)
Dept: LAB | Facility: CLINIC | Age: 72
End: 2022-11-29
Payer: MEDICARE

## 2022-11-29 DIAGNOSIS — R80.9 TYPE 2 DIABETES MELLITUS WITH MICROALBUMINURIA, WITHOUT LONG-TERM CURRENT USE OF INSULIN (H): ICD-10-CM

## 2022-11-29 DIAGNOSIS — E11.29 TYPE 2 DIABETES MELLITUS WITH MICROALBUMINURIA, WITHOUT LONG-TERM CURRENT USE OF INSULIN (H): ICD-10-CM

## 2022-11-29 LAB
ANION GAP SERPL CALCULATED.3IONS-SCNC: 11 MMOL/L (ref 7–15)
BUN SERPL-MCNC: 19.9 MG/DL (ref 8–23)
CALCIUM SERPL-MCNC: 9.3 MG/DL (ref 8.8–10.2)
CHLORIDE SERPL-SCNC: 98 MMOL/L (ref 98–107)
CHOLEST SERPL-MCNC: 171 MG/DL
CREAT SERPL-MCNC: 0.78 MG/DL (ref 0.51–0.95)
CREAT UR-MCNC: 43 MG/DL
DEPRECATED HCO3 PLAS-SCNC: 25 MMOL/L (ref 22–29)
GFR SERPL CREATININE-BSD FRML MDRD: 79 ML/MIN/1.73M2
GLUCOSE SERPL-MCNC: 115 MG/DL (ref 70–99)
HBA1C MFR BLD: 6.5 % (ref 0–5.6)
HDLC SERPL-MCNC: 70 MG/DL
LDLC SERPL CALC-MCNC: 87 MG/DL
MICROALBUMIN UR-MCNC: <12 MG/L
MICROALBUMIN/CREAT UR: NORMAL MG/G{CREAT}
NONHDLC SERPL-MCNC: 101 MG/DL
POTASSIUM SERPL-SCNC: 4.1 MMOL/L (ref 3.4–5.3)
SODIUM SERPL-SCNC: 134 MMOL/L (ref 136–145)
TRIGL SERPL-MCNC: 68 MG/DL
TSH SERPL DL<=0.005 MIU/L-ACNC: 2.18 UIU/ML (ref 0.3–4.2)

## 2022-11-29 PROCEDURE — 80061 LIPID PANEL: CPT | Performed by: INTERNAL MEDICINE

## 2022-11-29 PROCEDURE — 80048 BASIC METABOLIC PNL TOTAL CA: CPT | Performed by: INTERNAL MEDICINE

## 2022-11-29 PROCEDURE — 84443 ASSAY THYROID STIM HORMONE: CPT | Performed by: INTERNAL MEDICINE

## 2022-11-29 PROCEDURE — 82043 UR ALBUMIN QUANTITATIVE: CPT | Performed by: INTERNAL MEDICINE

## 2022-11-29 PROCEDURE — 36415 COLL VENOUS BLD VENIPUNCTURE: CPT | Performed by: INTERNAL MEDICINE

## 2022-11-29 PROCEDURE — 83036 HEMOGLOBIN GLYCOSYLATED A1C: CPT | Performed by: INTERNAL MEDICINE

## 2022-12-06 ENCOUNTER — OFFICE VISIT (OUTPATIENT)
Dept: ENDOCRINOLOGY | Facility: CLINIC | Age: 72
End: 2022-12-06
Payer: MEDICARE

## 2022-12-06 VITALS
SYSTOLIC BLOOD PRESSURE: 151 MMHG | BODY MASS INDEX: 26.43 KG/M2 | DIASTOLIC BLOOD PRESSURE: 78 MMHG | HEART RATE: 80 BPM | WEIGHT: 152.8 LBS

## 2022-12-06 DIAGNOSIS — R80.9 TYPE 2 DIABETES MELLITUS WITH MICROALBUMINURIA, WITHOUT LONG-TERM CURRENT USE OF INSULIN (H): Primary | ICD-10-CM

## 2022-12-06 DIAGNOSIS — E11.29 TYPE 2 DIABETES MELLITUS WITH MICROALBUMINURIA, WITHOUT LONG-TERM CURRENT USE OF INSULIN (H): Primary | ICD-10-CM

## 2022-12-06 PROCEDURE — 99213 OFFICE O/P EST LOW 20 MIN: CPT | Performed by: INTERNAL MEDICINE

## 2022-12-06 NOTE — LETTER
"    12/6/2022         RE: Aleja Gudino  [address]  [place] [address]        Dear Colleague,    Thank you for referring your patient, Aleja Gudino, to the [facility] [place]. Please see a copy of my visit note below.    Recent issues:  Diabetes follow-up evaluation  Feeling pretty well, had COVID-19 illness in 8/2022  Previous success with weight loss on Livea (formerly Medifast) diet plan, now on Livea \"simple\" maintenance phase... body wt stable  Tolerating the low dose Ozempic well  Noticing some symptoms feeling lightheaded, left arm cold, some bladder leakage           1993. Diagnosis of diabetes mellitus  ...Details of initial evaluation not available, body wt near 215#  Initial treatment with diet management  1997. Began metformin medication, but GI intolerance  Subsequent use of glimeparide, Tradjenta, basal insulin, Victoza, pioglitazone  2014. Began weight loss plan  5/2019. Discontinued pioglitazone, per plan  11/1/21. Stopped Victoza and changed to Ozempic     Current DM meds:  Ozempic 0.25 mg  Subcutaneous weekly     Using Accucheck BG meter              Tests 1-2x/day   14d avg 112 mg/dl, 90d 106 mg/dl    Previous [facility] labs include:          Recent [facility] labs include:  Lab Results   Component Value Date    A1C 6.5 (H) 11/29/2022     (L) 11/29/2022    POTASSIUM 4.1 11/29/2022    CHLORIDE 98 11/29/2022    CO2 25 11/29/2022    ANIONGAP 11 11/29/2022     (H) 11/29/2022    BUN 19.9 11/29/2022    CR 0.78 11/29/2022    GFRESTIMATED 79 11/29/2022    GFRESTBLACK >90 08/28/2019    HUANG 9.3 11/29/2022    CHOL 171 11/29/2022    TRIG 68 11/29/2022    HDL 70 11/29/2022    LDL 87 11/29/2022    NHDL 101 11/29/2022    UCRR 43.0 11/29/2022    MICROL <12.0 11/29/2022    UMALCR  11/29/2022      Comment:      Unable to calculate, urine albumin and/or urine creatinine is outside detectable limits.  Microalbuminuria is defined as an albumin:creatinine ratio of 17 to 299 for " males and 25 to 299 for females. A ratio of albumin:creatinine of 300 or higher is indicative of overt proteinuria.  Due to biologic variability, positive results should be confirmed by a second, first-morning random or 24-hour timed urine specimen. If there is discrepancy, a third specimen is recommended. When 2 out of 3 results are in the microalbuminuria range, this is evidence for incipient nephropathy and warrants increased efforts at glucose control, blood pressure control, and institution of therapy with an angiotensin-converting-enzyme (ACE) inhibitor (if the patient can tolerate it).       Lab Results   Component Value Date    TSH 2.18 2022     Last eye exam with Dr. Matute 2021, no DR  DM Complications: none known        Lives in [facility], has 2 grandchildren ages 11 and 8 yoa  Se[facility] Dr. Tanmay [facility]ola/[facility] for general medicine evaluations.  Also sees Dr. Bush/[facility]       PMH/PSH:  Past Medical History:   Diagnosis Date     Frontal fibrosing alopecia      Glaucoma      Hair loss      Hypertension      Sleep apnea     c-pap     Squamous cell carcinoma      Thyroid nodule      Type 2 diabetes mellitus (H)      Weight loss      Past Surgical History:   Procedure Laterality Date     BIOPSY OF SKIN LESION       CARPAL TUNNEL RELEASE RT/LT        SECTION       CHOLECYSTECTOMY       COLONOSCOPY N/A 3/19/2015    Procedure: COLONOSCOPY;  Surgeon: Ck Childress MD;  Location:  GI     Excision oral melanotic macule  2013    lower right lip     HC LAP,FULGURATE/EXCISE LESIONS      tumors removed and colon resection     KNEE SURGERY      arthroscopic- left knee     NO HISTORY OF SURGERY  11    derm     ORTHOPEDIC SURGERY      right and left knee replacements     RELEASE TRIGGER FINGER       RESECTION ABDOMINAL PERINEAL         Family Hx:  Family History   Problem Relation Age of Onset     Skin Cancer Father      Cancer No family hx of          No family history of skin cancer     Melanoma No family hx of          Social Hx:  Social History     Socioeconomic History     Marital status:      Spouse name: Not on file     Number of children: Not on file     Years of education: Not on file     Highest education level: Not on file   Occupational History     Not on file   Tobacco Use     Smoking status: Never     Smokeless tobacco: Never   Substance and Sexual Activity     Alcohol use: No     Alcohol/week: 0.0 standard drinks     Drug use: No     Sexual activity: Not on file   Other Topics Concern     Parent/sibling w/ CABG, MI or angioplasty before 65F 55M? Not Asked   Social History Narrative     Not on file     Social Determinants of Health     Financial Resource Strain: Not on file   Food Insecurity: Not on file   Transportation Needs: Not on file   Physical Activity: Not on file   Stress: Not on file   Social Connections: Not on file   Intimate Partner Violence: Not on file   Housing Stability: Not on file          MEDICATIONS:  has a current medication list which includes the following prescription(s): aspirin, vitamin d3, diclofenac sodium, fluticasone, latanoprost, losartan-hydrochlorothiazide, omeprazole, ozempic (0.25 or 0.5 mg/dose), piroxicam, blood glucose, blood glucose monitoring, famotidine, bd desire u/f, NONFORMULARY, and NONFORMULARY.       ROS: 10 point ROS neg other than the symptoms noted above in the HPI.     GENERAL: mild fatigue, weight stable 147-152#; denies fevers, chills, night sweats.   HEENT: no dysphagia, odonophagia, diplopia, neck pain  THYROID:  no apparent hyper or hypothyroid symptoms  CV: no chest pain, pressure, palpitations  LUNGS: no SOB, SIDHU, cough, wheezing   ABDOMEN: early satiety, constipation with BM Q3-4 days; no diarrhea, abdominal pain  EXTREMITIES: cold extremities L>R; no rashes, ulcers, edema  NEUROLOGY: some dizziness; no headaches, denies changes in vision, tingling, extremitiy numbness   MSK: less back  pains, some arthralgias of knees; no muscle weakness  SKIN: scalp hair thinning; no rashes or lesions  :  some bladder leakage; no menses  PSYCH:  stable mood, no significant anxiety or depression  ENDOCRINE: no heat or cold intolerance     Physical Exam   VS: BP (!) 161/78   Pulse 80   Wt 69.3 kg (152 lb 12.8 oz)   BMI 26.43 kg/m    GENERAL: AXOX3, NAD, well dressed, answering questions appropriately, appears stated age.  ENT: no nose swelling or nasal discharge, mouth redness or gum changes.  EYES: eyes grossly normal to inspection, conjunctivae and sclerae normal, no exophthalmos or proptosis  THYROID:  symmetric, nontender, no goiter or thyroid nodules on exam  LUNGS: no audible wheeze, cough or visible cyanosis, no visible retractions or increased work of breathing  ABDOMEN: abdomen normal size  EXTREMITIES: normal feet exam, pulses R/L DP 3/3; no edema, no lesions  NEUROLOGY: CN grossly intact, no tremors  MSK: grossly intact  SKIN:  wearing wig; no apparent skin lesions, rash, or edema with visualized skin appearance  PSYCH: mentation appears normal, affect normal/bright, judgement and insight intact,   normal speech and appearance well groomed    LABS:     All pertinent notes, labs, and images personally reviewed by me.     A/P:  Encounter Diagnosis   Name Primary?     Type 2 diabetes mellitus with microalbuminuria, without long-term current use of insulin (H) Yes       Comments:  Reviewed health history and diabetes issues.  Recent glycemic control very good, tolerating Ozempic med well  Reviewed and interpreted tests that I previously ordered.   Ordered appropriate tests for the endocrinology disease management.    Management options discussed and implemented after shared medical decision making with the patient.  T2DM problem is chronic-stable    Plan:  Discussed general issues with the diabetes diagnosis and management  We discussed the hgbA1c test which reflects previous overall glucose levels or  control  Discussed the importance of blood glucose (BG) testing to assess glucose trends  Provided general overview of the diabetes medication options and medication treatment plan.     Recommend:  Continue the current Ozempic 0.25 mg subcutaneous weekly  She prefers to continue the low dose Ozempic plan vs discontinuing T2DM med treatment  Discussed abdomen vs thigh injection site options  Check FBG daily or every other day, goal target  mg/dl  Monitor for symptom changes  Plan repeat lab tests in 6/2023   Consider nearby [facility]   Lab orders placed  Keep focus on diet, exercise, weight management.  Advise having fasting lipid panel testing and dilated eye examination, at least annually     Patient to follow up with their Primary Care Provider regarding the elevated blood pressure, recent symptoms  Addressed patient questions today     There are no Patient Instructions on file for this visit.    Future labs ordered today:   Orders Placed This Encounter   Procedures     Hemoglobin A1c     Basic metabolic panel     Radiology/Consults ordered today: None    Total time spent on day of encounter:  25 min    Follow-up:  6/2023, Return    CHANCE Thorne MD, MS  Endocrinology  [facility]                               Again, thank you for allowing me to participate in the care of your patient.        Sincerely,        Hector Thorne MD

## 2022-12-06 NOTE — PROGRESS NOTES
"Recent issues:  Diabetes follow-up evaluation  Feeling pretty well, had COVID-19 illness in 8/2022  Previous success with weight loss on Livea (formerly Medifast) diet plan, now on Livea \"simple\" maintenance phase... body wt stable  Tolerating the low dose Ozempic well  Noticing some symptoms feeling lightheaded, left arm cold, some bladder leakage           1993. Diagnosis of diabetes mellitus  ...Details of initial evaluation not available, body wt near 215#  Initial treatment with diet management  1997. Began metformin medication, but GI intolerance  Subsequent use of glimeparide, Tradjenta, basal insulin, Victoza, pioglitazone  2014. Began weight loss plan  5/2019. Discontinued pioglitazone, per plan  11/1/21. Stopped Victoza and changed to Ozempic     Current DM meds:  Ozempic 0.25 mg  Subcutaneous weekly     Using Accucheck BG meter              Tests 1-2x/day   14d avg 112 mg/dl, 90d 106 mg/dl    Previous [facility] labs include:          Recent [facility] labs include:  Lab Results   Component Value Date    A1C 6.5 (H) 11/29/2022     (L) 11/29/2022    POTASSIUM 4.1 11/29/2022    CHLORIDE 98 11/29/2022    CO2 25 11/29/2022    ANIONGAP 11 11/29/2022     (H) 11/29/2022    BUN 19.9 11/29/2022    CR 0.78 11/29/2022    GFRESTIMATED 79 11/29/2022    GFRESTBLACK >90 08/28/2019    HUANG 9.3 11/29/2022    CHOL 171 11/29/2022    TRIG 68 11/29/2022    HDL 70 11/29/2022    LDL 87 11/29/2022    NHDL 101 11/29/2022    UCRR 43.0 11/29/2022    MICROL <12.0 11/29/2022    UMALCR  11/29/2022      Comment:      Unable to calculate, urine albumin and/or urine creatinine is outside detectable limits.  Microalbuminuria is defined as an albumin:creatinine ratio of 17 to 299 for males and 25 to 299 for females. A ratio of albumin:creatinine of 300 or higher is indicative of overt proteinuria.  Due to biologic variability, positive results should be confirmed by a second, first-morning random or 24-hour timed urine specimen. If there " is discrepancy, a third specimen is recommended. When 2 out of 3 results are in the microalbuminuria range, this is evidence for incipient nephropathy and warrants increased efforts at glucose control, blood pressure control, and institution of therapy with an angiotensin-converting-enzyme (ACE) inhibitor (if the patient can tolerate it).       Lab Results   Component Value Date    TSH 2.18 2022     Last eye exam with Dr. Matute 2021, no DR  DM Complications: none known        Lives in [facility], has 2 grandchildren ages 11 and 8 yoa  Sees Dr. Tanmay rAriola/[facility] for general medicine evaluations.  Also sees Dr. Bush[facility]       PMH/PSH:  Past Medical History:   Diagnosis Date     Frontal fibrosing alopecia      Glaucoma      Hair loss      Hypertension      Sleep apnea     c-pap     Squamous cell carcinoma      Thyroid nodule      Type 2 diabetes mellitus (H)      Weight loss      Past Surgical History:   Procedure Laterality Date     BIOPSY OF SKIN LESION       CARPAL TUNNEL RELEASE RT/LT        SECTION       CHOLECYSTECTOMY       COLONOSCOPY N/A 3/19/2015    Procedure: COLONOSCOPY;  Surgeon: Ck Childress MD;  Location: [facility]     Excision oral melanotic macule  2013    lower right lip     HC LAP,FULGURATE/EXCISE LESIONS      tumors removed and colon resection     KNEE SURGERY      arthroscopic- left knee     NO HISTORY OF SURGERY  11    derm     ORTHOPEDIC SURGERY      right and left knee replacements     RELEASE TRIGGER FINGER       RESECTION ABDOMINAL PERINEAL         Family Hx:  Family History   Problem Relation Age of Onset     Skin Cancer Father      Cancer No family hx of         No family history of skin cancer     Melanoma No family hx of          Social Hx:  Social History     Socioeconomic History     Marital status:      Spouse name: Not on file     Number of children: Not on file     Years of education: Not on file      Highest education level: Not on file   Occupational History     Not on file   Tobacco Use     Smoking status: Never     Smokeless tobacco: Never   Substance and Sexual Activity     Alcohol use: No     Alcohol/week: 0.0 standard drinks     Drug use: No     Sexual activity: Not on file   Other Topics Concern     Parent/sibling w/ CABG, MI or angioplasty before 65F 55M? Not Asked   Social History Narrative     Not on file     Social Determinants of Health     Financial Resource Strain: Not on file   Food Insecurity: Not on file   Transportation Needs: Not on file   Physical Activity: Not on file   Stress: Not on file   Social Connections: Not on file   Intimate Partner Violence: Not on file   Housing Stability: Not on file          MEDICATIONS:  has a current medication list which includes the following prescription(s): aspirin, vitamin d3, diclofenac sodium, fluticasone, latanoprost, losartan-hydrochlorothiazide, omeprazole, ozempic (0.25 or 0.5 mg/dose), piroxicam, blood glucose, blood glucose monitoring, famotidine, bd desire u/f, NONFORMULARY, and NONFORMULARY.       ROS: 10 point ROS neg other than the symptoms noted above in the HPI.     GENERAL: mild fatigue, weight stable 147-152#; denies fevers, chills, night sweats.   HEENT: no dysphagia, odonophagia, diplopia, neck pain  THYROID:  no apparent hyper or hypothyroid symptoms  CV: no chest pain, pressure, palpitations  LUNGS: no SOB, SIDHU, cough, wheezing   ABDOMEN: early satiety, constipation with BM Q3-4 days; no diarrhea, abdominal pain  EXTREMITIES: cold extremities L>R; no rashes, ulcers, edema  NEUROLOGY: some dizziness; no headaches, denies changes in vision, tingling, extremitiy numbness   MSK: less back pains, some arthralgias of knees; no muscle weakness  SKIN: scalp hair thinning; no rashes or lesions  :  some bladder leakage; no menses  PSYCH:  stable mood, no significant anxiety or depression  ENDOCRINE: no heat or cold intolerance     Physical Exam    VS: BP (!) 161/78   Pulse 80   Wt 69.3 kg (152 lb 12.8 oz)   BMI 26.43 kg/m    GENERAL: AXOX3, NAD, well dressed, answering questions appropriately, appears stated age.  ENT: no nose swelling or nasal discharge, mouth redness or gum changes.  EYES: eyes grossly normal to inspection, conjunctivae and sclerae normal, no exophthalmos or proptosis  THYROID:  symmetric, nontender, no goiter or thyroid nodules on exam  LUNGS: no audible wheeze, cough or visible cyanosis, no visible retractions or increased work of breathing  ABDOMEN: abdomen normal size  EXTREMITIES: normal feet exam, pulses R/L DP 3/3; no edema, no lesions  NEUROLOGY: CN grossly intact, no tremors  MSK: grossly intact  SKIN:  wearing wig; no apparent skin lesions, rash, or edema with visualized skin appearance  PSYCH: mentation appears normal, affect normal/bright, judgement and insight intact,   normal speech and appearance well groomed    LABS:     All pertinent notes, labs, and images personally reviewed by me.     A/P:  Encounter Diagnosis   Name Primary?     Type 2 diabetes mellitus with microalbuminuria, without long-term current use of insulin (H) Yes       Comments:  Reviewed health history and diabetes issues.  Recent glycemic control very good, tolerating Ozempic med well  Reviewed and interpreted tests that I previously ordered.   Ordered appropriate tests for the endocrinology disease management.    Management options discussed and implemented after shared medical decision making with the patient.  T2DM problem is chronic-stable    Plan:  Discussed general issues with the diabetes diagnosis and management  We discussed the hgbA1c test which reflects previous overall glucose levels or control  Discussed the importance of blood glucose (BG) testing to assess glucose trends  Provided general overview of the diabetes medication options and medication treatment plan.     Recommend:  Continue the current Ozempic 0.25 mg subcutaneous  weekly  She prefers to continue the low dose Ozempic plan vs discontinuing T2DM med treatment  Discussed abdomen vs thigh injection site options  Check FBG daily or every other day, goal target  mg/dl  Monitor for symptom changes  Plan repeat lab tests in 6/2023   Consider nearby [facility] clinic   Lab orders placed  Keep focus on diet, exercise, weight management.  Advise having fasting lipid panel testing and dilated eye examination, at least annually     Patient to follow up with their Primary Care Provider regarding the elevated blood pressure, recent symptoms  Addressed patient questions today     There are no Patient Instructions on file for this visit.    Future labs ordered today:   Orders Placed This Encounter   Procedures     Hemoglobin A1c     Basic metabolic panel     Radiology/Consults ordered today: None    Total time spent on day of encounter:  25 min    Follow-up:  6/2023, Return    CHANCE Thorne MD, MS  Endocrinology  [facility]

## 2023-01-24 ENCOUNTER — TRANSFERRED RECORDS (OUTPATIENT)
Dept: HEALTH INFORMATION MANAGEMENT | Facility: CLINIC | Age: 73
End: 2023-01-24
Payer: MEDICARE

## 2023-01-24 LAB — RETINOPATHY: NEGATIVE

## 2023-02-03 DIAGNOSIS — R80.9 TYPE 2 DIABETES MELLITUS WITH MICROALBUMINURIA, WITHOUT LONG-TERM CURRENT USE OF INSULIN (H): ICD-10-CM

## 2023-02-03 DIAGNOSIS — E11.29 TYPE 2 DIABETES MELLITUS WITH MICROALBUMINURIA, WITHOUT LONG-TERM CURRENT USE OF INSULIN (H): ICD-10-CM

## 2023-02-03 RX ORDER — LANCETS
EACH MISCELLANEOUS
Qty: 100 EACH | Refills: 3 | Status: SHIPPED | OUTPATIENT
Start: 2023-02-03 | End: 2023-03-10

## 2023-02-03 NOTE — TELEPHONE ENCOUNTER
Last Written Prescription Date:  8/4/2021  Last Fill Quantity: 100,  # refills: 3   Last office visit: 12/6/2022 with prescribing provider:     Future Office Visit:      Requested Prescriptions   Pending Prescriptions Disp Refills     blood glucose monitoring (ACCU-CHEK FASTCLIX) lancets 100 each 3     Sig: Use 1x daily for blood glucose testing       There is no refill protocol information for this order

## 2023-02-03 NOTE — TELEPHONE ENCOUNTER
"Requested Prescriptions   Pending Prescriptions Disp Refills     blood glucose monitoring (ACCU-CHEK FASTCLIX) lancets 100 each 3     Sig: Use 1x daily for blood glucose testing       Diabetic Supplies Protocol Passed - 2/3/2023  8:52 AM        Passed - Medication is active on med list        Passed - Patient is 18 years of age or older        Passed - Recent (6 mo) or future (30 days) visit within the authorizing provider's specialty     Patient had office visit in the last 6 months or has a visit in the next 30 days with authorizing provider.  See \"Patient Info\" tab in inbasket, or \"Choose Columns\" in Meds & Orders section of the refill encounter.               Next appt 6/28/23  Refills sent  Maureen Walker RN    "

## 2023-03-10 ENCOUNTER — MYC MEDICAL ADVICE (OUTPATIENT)
Dept: ENDOCRINOLOGY | Facility: CLINIC | Age: 73
End: 2023-03-10
Payer: MEDICARE

## 2023-03-10 DIAGNOSIS — R80.9 TYPE 2 DIABETES MELLITUS WITH MICROALBUMINURIA, WITHOUT LONG-TERM CURRENT USE OF INSULIN (H): ICD-10-CM

## 2023-03-10 DIAGNOSIS — E11.29 TYPE 2 DIABETES MELLITUS WITH MICROALBUMINURIA, WITHOUT LONG-TERM CURRENT USE OF INSULIN (H): ICD-10-CM

## 2023-03-10 RX ORDER — LANCETS
EACH MISCELLANEOUS
Qty: 100 EACH | Refills: 3 | Status: SHIPPED | OUTPATIENT
Start: 2023-03-10

## 2023-03-10 NOTE — TELEPHONE ENCOUNTER
Last Written Prescription Date:  2/23/22  Last Fill Quantity: strips #100 w/3 refills; lancets #100 w/3 refills  Last office visit: 12/6/2022 with prescribing provider:  12/6/2023  Future Office Visit:  6/28/23    Requested Prescriptions   Pending Prescriptions Disp Refills     blood glucose (NO BRAND SPECIFIED) test strip 100 strip 3     Sig: Use to test blood sugar 1 times daily or as directed. Accu-Check Kelly Plus strips       There is no refill protocol information for this order        blood glucose monitoring (ACCU-CHEK FASTCLIX) lancets 100 each 3     Sig: Use 1x daily for blood glucose testing       There is no refill protocol information for this order        Vivien Bajwa RN

## 2023-03-30 ENCOUNTER — MYC MEDICAL ADVICE (OUTPATIENT)
Dept: ENDOCRINOLOGY | Facility: CLINIC | Age: 73
End: 2023-03-30
Payer: MEDICARE

## 2023-03-30 DIAGNOSIS — R80.9 TYPE 2 DIABETES MELLITUS WITH MICROALBUMINURIA, WITHOUT LONG-TERM CURRENT USE OF INSULIN (H): ICD-10-CM

## 2023-03-30 DIAGNOSIS — E11.29 TYPE 2 DIABETES MELLITUS WITH MICROALBUMINURIA, WITHOUT LONG-TERM CURRENT USE OF INSULIN (H): ICD-10-CM

## 2023-03-30 RX ORDER — SEMAGLUTIDE 1.34 MG/ML
INJECTION, SOLUTION SUBCUTANEOUS
Qty: 4.5 ML | Refills: 1 | Status: SHIPPED | OUTPATIENT
Start: 2023-03-30 | End: 2024-02-21

## 2023-03-30 NOTE — TELEPHONE ENCOUNTER
Last Written Prescription Date:  4/25/22  Last Fill Quantity: 4.5,  # refills: 1   Last office visit: 12/6/2022 with Dr. Thorne  Future Office Visit:  6/28/23        Requested Prescriptions   Pending Prescriptions Disp Refills     semaglutide (OZEMPIC (0.25 OR 0.5 MG/DOSE)) 2 MG/1.5ML SOPN pen 4.5 mL 1     Sig: INJECT 0.5MG SUBCUTANEOUSLYWEEKLY, STOP VICTOZA WHEN  OZEMPIC STARTED       There is no refill protocol information for this order        Refills sent  Maureen Walker RN

## 2023-05-27 ENCOUNTER — HEALTH MAINTENANCE LETTER (OUTPATIENT)
Age: 73
End: 2023-05-27

## 2023-06-10 ENCOUNTER — HEALTH MAINTENANCE LETTER (OUTPATIENT)
Age: 73
End: 2023-06-10

## 2023-06-26 ENCOUNTER — LAB (OUTPATIENT)
Dept: LAB | Facility: CLINIC | Age: 73
End: 2023-06-26
Payer: MEDICARE

## 2023-06-26 DIAGNOSIS — E11.29 TYPE 2 DIABETES MELLITUS WITH MICROALBUMINURIA, WITHOUT LONG-TERM CURRENT USE OF INSULIN (H): ICD-10-CM

## 2023-06-26 DIAGNOSIS — R80.9 TYPE 2 DIABETES MELLITUS WITH MICROALBUMINURIA, WITHOUT LONG-TERM CURRENT USE OF INSULIN (H): ICD-10-CM

## 2023-06-26 LAB
ANION GAP SERPL CALCULATED.3IONS-SCNC: 12 MMOL/L (ref 7–15)
BUN SERPL-MCNC: 14.5 MG/DL (ref 8–23)
CALCIUM SERPL-MCNC: 9.7 MG/DL (ref 8.8–10.2)
CHLORIDE SERPL-SCNC: 92 MMOL/L (ref 98–107)
CREAT SERPL-MCNC: 0.89 MG/DL (ref 0.51–0.95)
DEPRECATED HCO3 PLAS-SCNC: 28 MMOL/L (ref 22–29)
GFR SERPL CREATININE-BSD FRML MDRD: 68 ML/MIN/1.73M2
GLUCOSE SERPL-MCNC: 140 MG/DL (ref 70–99)
HBA1C MFR BLD: 6.9 % (ref 0–5.6)
POTASSIUM SERPL-SCNC: 4.2 MMOL/L (ref 3.4–5.3)
SODIUM SERPL-SCNC: 132 MMOL/L (ref 136–145)

## 2023-06-26 PROCEDURE — 83036 HEMOGLOBIN GLYCOSYLATED A1C: CPT

## 2023-06-26 PROCEDURE — 36415 COLL VENOUS BLD VENIPUNCTURE: CPT

## 2023-06-26 PROCEDURE — 80048 BASIC METABOLIC PNL TOTAL CA: CPT

## 2023-06-27 ENCOUNTER — TELEPHONE (OUTPATIENT)
Dept: ENDOCRINOLOGY | Facility: CLINIC | Age: 73
End: 2023-06-27

## 2023-06-27 NOTE — TELEPHONE ENCOUNTER
[facility] Call [facility]    Phone Message    May a detailed message be left on voicemail: yes     Reason for Call: Other: Per pt would like to know if her appt for tomorrow 06/28/23 can be reschedule to something within a few weeks. Per pt isnt feeling well and doesnt want to wait until Next year Jan 2024 to be seen. If nothing can be done , pt will come to tomorrow appt no matter what.  Please call pt back to discuss. Thank you!     Action Taken: Message routed to:  [facility] ([facility] ENDO    Travel Screening: Not Applicable

## 2023-07-31 ENCOUNTER — OFFICE VISIT (OUTPATIENT)
Dept: ENDOCRINOLOGY | Facility: CLINIC | Age: 73
End: 2023-07-31
Payer: MEDICARE

## 2023-07-31 VITALS
WEIGHT: 152 LBS | BODY MASS INDEX: 26.3 KG/M2 | HEART RATE: 84 BPM | SYSTOLIC BLOOD PRESSURE: 154 MMHG | DIASTOLIC BLOOD PRESSURE: 80 MMHG

## 2023-07-31 DIAGNOSIS — R80.9 TYPE 2 DIABETES MELLITUS WITH MICROALBUMINURIA, WITHOUT LONG-TERM CURRENT USE OF INSULIN (H): Primary | ICD-10-CM

## 2023-07-31 DIAGNOSIS — E11.29 TYPE 2 DIABETES MELLITUS WITH MICROALBUMINURIA, WITHOUT LONG-TERM CURRENT USE OF INSULIN (H): Primary | ICD-10-CM

## 2023-07-31 PROCEDURE — 99214 OFFICE O/P EST MOD 30 MIN: CPT | Performed by: INTERNAL MEDICINE

## 2023-07-31 RX ORDER — LANOLIN ALCOHOL/MO/W.PET/CERES
1000 CREAM (GRAM) TOPICAL DAILY
COMMUNITY
Start: 2022-01-12

## 2023-07-31 NOTE — PROGRESS NOTES
Recent issues:  Diabetes follow-up evaluation  Tolerating the low dose Ozempic well  Had another COVID-19 infection after trip to [place] 6/2023, lingering symptoms of fatigue and HA, body weight stable  Tries to do PT exercises with her back pain management           1993. Diagnosis of diabetes mellitus  ...Details of initial evaluation not available, body wt near 215#  Initial treatment with diet management  1997. Began metformin medication, but GI intolerance  Subsequent use of glimeparide, Tradjenta, basal insulin, Victoza, pioglitazone  2014. Began weight loss plan  5/2019. Discontinued pioglitazone, per plan  11/1/21. Stopped Victoza and changed to Ozempic     Current DM meds:  Ozempic 0.25 mg  Subcutaneous weekly     Using Accucheck BG meter              Tests 1-2x/day   14d avg 121 mg/dl, range 108-136 mg/dl    Previous [facility] labs include:          Recent [facility] labs include:  Lab Results   Component Value Date    A1C 6.9 (H) 06/26/2023     (L) 06/26/2023    POTASSIUM 4.2 06/26/2023    CHLORIDE 92 (L) 06/26/2023    CO2 28 06/26/2023    ANIONGAP 12 06/26/2023     (H) 06/26/2023    BUN 14.5 06/26/2023    CR 0.89 06/26/2023    GFRESTIMATED 68 06/26/2023    GFRESTBLACK >90 08/28/2019    HUANG 9.7 06/26/2023    CHOL 171 11/29/2022    TRIG 68 11/29/2022    HDL 70 11/29/2022    LDL 87 11/29/2022    NHDL 101 11/29/2022    UCRR 43.0 11/29/2022    MICROL <12.0 11/29/2022    UMALCR  11/29/2022      Comment:      Unable to calculate, urine albumin and/or urine creatinine is outside detectable limits.  Microalbuminuria is defined as an albumin:creatinine ratio of 17 to 299 for males and 25 to 299 for females. A ratio of albumin:creatinine of 300 or higher is indicative of overt proteinuria.  Due to biologic variability, positive results should be confirmed by a second, first-morning random or 24-hour timed urine specimen. If there is discrepancy, a third specimen is recommended. When 2 out of 3 results are in the  microalbuminuria range, this is evidence for incipient nephropathy and warrants increased efforts at glucose control, blood pressure control, and institution of therapy with an angiotensin-converting-enzyme (ACE) inhibitor (if the patient can tolerate it).       Lab Results   Component Value Date    TSH 2.18 2022     Last eye exam with Dr. Matute 2021, no DR  DM Complications: none known        Lives in [place], has 2 grandchildren ages 11 and 8 yoa  Sees Dr. Tanmay [facility] for general medicine evaluations.  Also sees Dr. Bush[facility]       PMH/PSH:  Past Medical History:   Diagnosis Date    Frontal fibrosing alopecia     Glaucoma     Hair loss     Hypertension     Sleep apnea     c-pap    Squamous cell carcinoma     Thyroid nodule     Type 2 diabetes mellitus (H)     Weight loss      Past Surgical History:   Procedure Laterality Date    BIOPSY OF SKIN LESION      CARPAL TUNNEL RELEASE RT/LT       SECTION      CHOLECYSTECTOMY      COLONOSCOPY N/A 3/19/2015    Procedure: COLONOSCOPY;  Surgeon: Ck Childress MD;  Location: [facility]    Excision oral melanotic macule  2013    lower right lip    HC LAP,FULGURATE/EXCISE LESIONS      tumors removed and colon resection    KNEE SURGERY      arthroscopic- left knee    NO HISTORY OF SURGERY  11    derm    ORTHOPEDIC SURGERY      right and left knee replacements    RELEASE TRIGGER FINGER      RESECTION ABDOMINAL PERINEAL         Family Hx:  Family History   Problem Relation Age of Onset    Skin Cancer Father     Cancer No family hx of         No family history of skin cancer    Melanoma No family hx of          Social Hx:  Social History     Socioeconomic History    Marital status:      Spouse name: Not on file    Number of children: Not on file    Years of education: Not on file    Highest education level: Not on file   Occupational History    Not on file   Tobacco Use    Smoking status: Never     Smokeless tobacco: Never   Substance and Sexual Activity    Alcohol use: No     Alcohol/week: 0.0 standard drinks of alcohol    Drug use: No    Sexual activity: Not on file   Other Topics Concern    Parent/sibling w/ CABG, MI or angioplasty before 65F 55M? Not Asked   Social History Narrative    Not on file     Social Determinants of Health     Financial Resource Strain: Not on file   Food Insecurity: Not on file   Transportation Needs: Not on file   Physical Activity: Not on file   Stress: Not on file   Social Connections: Not on file   Intimate Partner Violence: Not on file   Housing Stability: Not on file          MEDICATIONS:  has a current medication list which includes the following prescription(s): aspirin, vitamin d3, cyanocobalamin, diclofenac sodium, fluticasone, latanoprost, losartan-hydrochlorothiazide, NONFORMULARY, NONFORMULARY, omeprazole, piroxicam, tizanidine, blood glucose, blood glucose monitoring, famotidine, bd desire u/f, and ozempic (0.25 or 0.5 mg/dose).       ROS: 10 point ROS neg other than the symptoms noted above in the HPI.     GENERAL: mild fatigue, weight stable 147-152#; denies fevers, chills, night sweats.   HEENT: no dysphagia, odonophagia, diplopia, neck pain  THYROID:  no apparent hyper or hypothyroid symptoms  CV: no chest pain, pressure, palpitations  LUNGS: no SOB, SIDHU, cough, wheezing   ABDOMEN: early satiety, some constipation with BM Q3-4 days; no diarrhea, abdominal pain  EXTREMITIES: no rashes, ulcers, edema  NEUROLOGY: some headaches; denies changes in vision, tingling, extremitiy numbness   MSK: less back pains, some arthralgias of knees; no muscle weakness  SKIN: scalp hair thinning; no rashes or lesions  :  more frequent urination and some bladder leakage; no menses  PSYCH:  stable mood, no significant anxiety or depression  ENDOCRINE: variable heat or cold intolerance     Physical Exam   VS: BP (!) 154/80   Pulse 84   Wt 68.9 kg (152 lb)   BMI 26.30 kg/m     GENERAL: AXOX3, NAD, well dressed, answering questions appropriately, appears stated age.  ENT: no nose swelling or nasal discharge, mouth redness or gum changes.  EYES: eyes grossly normal to inspection, conjunctivae and sclerae normal, no exophthalmos or proptosis  THYROID:  symmetric, nontender, no goiter or thyroid nodules on exam  LUNGS: no audible wheeze, cough or visible cyanosis, no visible retractions or increased work of breathing  ABDOMEN: abdomen normal size  EXTREMITIES: normal feet exam, pulses R/L DP 3/3; no edema, no lesions  NEUROLOGY: CN grossly intact, no tremors  MSK: grossly intact  SKIN:  wearing wig; no apparent skin lesions, rash, or edema with visualized skin appearance  PSYCH: mentation appears normal, affect normal/bright, judgement and insight intact,   normal speech and appearance well groomed    LABS:     All pertinent notes, labs, and images personally reviewed by me.     A/P:  Encounter Diagnosis   Name Primary?    Type 2 diabetes mellitus with microalbuminuria, without long-term current use of insulin (H) Yes         Comments:  Reviewed health history and diabetes issues.  Recent glycemic control very good, tolerating Ozempic med well  Reviewed and interpreted tests that I previously ordered.   Ordered appropriate tests for the endocrinology disease management.    Management options discussed and implemented after shared medical decision making with the patient.  T2DM problem is chronic-stable    Plan:  Discussed general issues with the diabetes diagnosis and management  We discussed the hgbA1c test which reflects previous overall glucose levels or control  Discussed the importance of blood glucose (BG) testing to assess glucose trends  Provided general overview of the diabetes medication options and medication treatment plan.     Recommend:  Continue the current Ozempic 0.25 mg subcutaneous weekly  She prefers to continue the low dose Ozempic plan vs discontinuing T2DM med  treatment  We discussed pen needle options, also the abdomen vs thigh injection site options  Check FBG daily or every other day, goal target  mg/dl  Monitor for symptom changes  Plan repeat lab tests in 1/2024   Consider nearby [facility] clinic   Lab orders placed  Keep focus on diet, exercise, weight management.  Advise having fasting lipid panel testing and dilated eye examination, at least annually     See PCP to review the urinary and temperature intolerance questions  Patient to follow up with their Primary Care Provider regarding the elevated blood pressure.  Addressed patient questions today     There are no Patient Instructions on file for this visit.    Future labs ordered today:   Orders Placed This Encounter   Procedures    Hemoglobin A1c    Basic metabolic panel    ALT    Albumin Random Urine Quantitative with Creat Ratio     Radiology/Consults ordered today: None    Total time spent on day of encounter:  33 min    Follow-up:  1/2024, Return    CHANCE Thorne MD, MS  Endocrinology  [facility]

## 2023-07-31 NOTE — LETTER
7/31/2023         RE: Aleja Gudino  [address]  [facility] [address]        Dear Colleague,    Thank you for referring your patient, Aleja Gudino, to the [facility] [place]. Please see a copy of my visit note below.    Recent issues:  Diabetes follow-up evaluation  Tolerating the low dose Ozempic well  Had another COVID-19 infection after trip to [place] 6/2023, lingering symptoms of fatigue and HA, body weight stable  Tries to do PT exercises with her back pain management           1993. Diagnosis of diabetes mellitus  ...Details of initial evaluation not available, body wt near 215#  Initial treatment with diet management  1997. Began metformin medication, but GI intolerance  Subsequent use of glimeparide, Tradjenta, basal insulin, Victoza, pioglitazone  2014. Began weight loss plan  5/2019. Discontinued pioglitazone, per plan  11/1/21. Stopped Victoza and changed to Ozempic     Current DM meds:  Ozempic 0.25 mg  Subcutaneous weekly     Using Accucheck BG meter              Tests 1-2x/day   14d avg 121 mg/dl, range 108-136 mg/dl    Previous [facility] labs include:          Recent [facility] labs include:  Lab Results   Component Value Date    A1C 6.9 (H) 06/26/2023     (L) 06/26/2023    POTASSIUM 4.2 06/26/2023    CHLORIDE 92 (L) 06/26/2023    CO2 28 06/26/2023    ANIONGAP 12 06/26/2023     (H) 06/26/2023    BUN 14.5 06/26/2023    CR 0.89 06/26/2023    GFRESTIMATED 68 06/26/2023    GFRESTBLACK >90 08/28/2019    HUANG 9.7 06/26/2023    CHOL 171 11/29/2022    TRIG 68 11/29/2022    HDL 70 11/29/2022    LDL 87 11/29/2022    NHDL 101 11/29/2022    UCRR 43.0 11/29/2022    MICROL <12.0 11/29/2022    UMALCR  11/29/2022      Comment:      Unable to calculate, urine albumin and/or urine creatinine is outside detectable limits.  Microalbuminuria is defined as an albumin:creatinine ratio of 17 to 299 for males and 25 to 299 for females. A ratio of albumin:creatinine of 300 or higher is  indicative of overt proteinuria.  Due to biologic variability, positive results should be confirmed by a second, first-morning random or 24-hour timed urine specimen. If there is discrepancy, a third specimen is recommended. When 2 out of 3 results are in the microalbuminuria range, this is evidence for incipient nephropathy and warrants increased efforts at glucose control, blood pressure control, and institution of therapy with an angiotensin-converting-enzyme (ACE) inhibitor (if the patient can tolerate it).       Lab Results   Component Value Date    TSH 2.18 2022     Last eye exam with Dr. Matute 2021, no DR  DM Complications: none known        Lives in [facility], has 2 grandchildren ages 11 and 8 yoa  Sees Dr. Tanmay Arriola/[facility] for general medicine evaluations.  Also sees Dr. Bush/[facility]       PMH/PSH:  Past Medical History:   Diagnosis Date     Frontal fibrosing alopecia      Glaucoma      Hair loss      Hypertension      Sleep apnea     c-pap     Squamous cell carcinoma      Thyroid nodule      Type 2 diabetes mellitus (H)      Weight loss      Past Surgical History:   Procedure Laterality Date     BIOPSY OF SKIN LESION       CARPAL TUNNEL RELEASE RT/LT        SECTION       CHOLECYSTECTOMY       COLONOSCOPY N/A 3/19/2015    Procedure: COLONOSCOPY;  Surgeon: Ck Childress MD;  Location: [facility]     Excision oral melanotic macule  2013    lower right lip     HC LAP,FULGURATE/EXCISE LESIONS      tumors removed and colon resection     KNEE SURGERY      arthroscopic- left knee     NO HISTORY OF SURGERY  13    derm     ORTHOPEDIC SURGERY      right and left knee replacements     RELEASE TRIGGER FINGER       RESECTION ABDOMINAL PERINEAL         Family Hx:  Family History   Problem Relation Age of Onset     Skin Cancer Father      Cancer No family hx of         No family history of skin cancer     Melanoma No family hx of          Social  Hx:  Social History     Socioeconomic History     Marital status:      Spouse name: Not on file     Number of children: Not on file     Years of education: Not on file     Highest education level: Not on file   Occupational History     Not on file   Tobacco Use     Smoking status: Never     Smokeless tobacco: Never   Substance and Sexual Activity     Alcohol use: No     Alcohol/week: 0.0 standard drinks of alcohol     Drug use: No     Sexual activity: Not on file   Other Topics Concern     Parent/sibling w/ CABG, MI or angioplasty before 65F 55M? Not Asked   Social History Narrative     Not on file     Social Determinants of Health     Financial Resource Strain: Not on file   Food Insecurity: Not on file   Transportation Needs: Not on file   Physical Activity: Not on file   Stress: Not on file   Social Connections: Not on file   Intimate Partner Violence: Not on file   Housing Stability: Not on file          MEDICATIONS:  has a current medication list which includes the following prescription(s): aspirin, vitamin d3, cyanocobalamin, diclofenac sodium, fluticasone, latanoprost, losartan-hydrochlorothiazide, NONFORMULARY, NONFORMULARY, omeprazole, piroxicam, tizanidine, blood glucose, blood glucose monitoring, famotidine, bd desire u/f, and ozempic (0.25 or 0.5 mg/dose).       ROS: 10 point ROS neg other than the symptoms noted above in the HPI.     GENERAL: mild fatigue, weight stable 147-152#; denies fevers, chills, night sweats.   HEENT: no dysphagia, odonophagia, diplopia, neck pain  THYROID:  no apparent hyper or hypothyroid symptoms  CV: no chest pain, pressure, palpitations  LUNGS: no SOB, SIDHU, cough, wheezing   ABDOMEN: early satiety, some constipation with BM Q3-4 days; no diarrhea, abdominal pain  EXTREMITIES: no rashes, ulcers, edema  NEUROLOGY: some headaches; denies changes in vision, tingling, extremitiy numbness   MSK: less back pains, some arthralgias of knees; no muscle weakness  SKIN: scalp hair  thinning; no rashes or lesions  :  more frequent urination and some bladder leakage; no menses  PSYCH:  stable mood, no significant anxiety or depression  ENDOCRINE: variable heat or cold intolerance     Physical Exam   VS: BP (!) 154/80   Pulse 84   Wt 68.9 kg (152 lb)   BMI 26.30 kg/m    GENERAL: AXOX3, NAD, well dressed, answering questions appropriately, appears stated age.  ENT: no nose swelling or nasal discharge, mouth redness or gum changes.  EYES: eyes grossly normal to inspection, conjunctivae and sclerae normal, no exophthalmos or proptosis  THYROID:  symmetric, nontender, no goiter or thyroid nodules on exam  LUNGS: no audible wheeze, cough or visible cyanosis, no visible retractions or increased work of breathing  ABDOMEN: abdomen normal size  EXTREMITIES: normal feet exam, pulses R/L DP 3/3; no edema, no lesions  NEUROLOGY: CN grossly intact, no tremors  MSK: grossly intact  SKIN:  wearing wig; no apparent skin lesions, rash, or edema with visualized skin appearance  PSYCH: mentation appears normal, affect normal/bright, judgement and insight intact,   normal speech and appearance well groomed    LABS:     All pertinent notes, labs, and images personally reviewed by me.     A/P:  Encounter Diagnosis   Name Primary?     Type 2 diabetes mellitus with microalbuminuria, without long-term current use of insulin (H) Yes         Comments:  Reviewed health history and diabetes issues.  Recent glycemic control very good, tolerating Ozempic med well  Reviewed and interpreted tests that I previously ordered.   Ordered appropriate tests for the endocrinology disease management.    Management options discussed and implemented after shared medical decision making with the patient.  T2DM problem is chronic-stable    Plan:  Discussed general issues with the diabetes diagnosis and management  We discussed the hgbA1c test which reflects previous overall glucose levels or control  Discussed the importance of blood  glucose (BG) testing to assess glucose trends  Provided general overview of the diabetes medication options and medication treatment plan.     Recommend:  Continue the current Ozempic 0.25 mg subcutaneous weekly  She prefers to continue the low dose Ozempic plan vs discontinuing T2DM med treatment  We discussed pen needle options, also the abdomen vs thigh injection site options  Check FBG daily or every other day, goal target  mg/dl  Monitor for symptom changes  Plan repeat lab tests in 1/2024   Consider nearby [facility] clinic   Lab orders placed  Keep focus on diet, exercise, weight management.  Advise having fasting lipid panel testing and dilated eye examination, at least annually     See PCP to review the urinary and temperature intolerance questions  Patient to follow up with their Primary Care Provider regarding the elevated blood pressure.  Addressed patient questions today     There are no Patient Instructions on file for this visit.    Future labs ordered today:   Orders Placed This Encounter   Procedures     Hemoglobin A1c     Basic metabolic panel     ALT     Albumin Random Urine Quantitative with Creat Ratio     Radiology/Consults ordered today: None    Total time spent on day of encounter:  33 min    Follow-up:  1/2024, Return    CHANCE Thorne MD, MS  Endocrinology  [facility]                               Again, thank you for allowing me to participate in the care of your patient.        Sincerely,        Hector Thorne MD

## 2023-08-31 ENCOUNTER — TELEPHONE (OUTPATIENT)
Dept: ENDOCRINOLOGY | Facility: CLINIC | Age: 73
End: 2023-08-31
Payer: MEDICARE

## 2023-08-31 NOTE — TELEPHONE ENCOUNTER
Called pt.  She received a cortisone inj in her hip about a wk ago.  Since then her sugars have been in the 220-240's, and states they have never been this high.  Last night at  it was 300.  She had some dental work done as well, and received novacaine.  Pt denies symptoms.  Will route to provider to advise as pt is very worried.  Maureen Walker RN

## 2023-08-31 NOTE — TELEPHONE ENCOUNTER
[facility] Call Center    Phone Message    May a detailed message be left on voicemail: yes     Reason for Call: Other: Patient is calling about her Blood Sugar Levels being elevated last reading was 225- had a Cortizone shot a week ago but thought her levels would have come back down by now .  Please advise thank you     Action Taken: Other: ENDO    Travel Screening: Not Applicable

## 2023-09-01 ENCOUNTER — TELEPHONE (OUTPATIENT)
Dept: ENDOCRINOLOGY | Facility: CLINIC | Age: 73
End: 2023-09-01
Payer: MEDICARE

## 2023-09-01 DIAGNOSIS — E11.29 TYPE 2 DIABETES MELLITUS WITH MICROALBUMINURIA, WITHOUT LONG-TERM CURRENT USE OF INSULIN (H): Primary | ICD-10-CM

## 2023-09-01 DIAGNOSIS — E11.29 TYPE 2 DIABETES MELLITUS WITH MICROALBUMINURIA, WITHOUT LONG-TERM CURRENT USE OF INSULIN (H): ICD-10-CM

## 2023-09-01 DIAGNOSIS — R80.9 TYPE 2 DIABETES MELLITUS WITH MICROALBUMINURIA, WITHOUT LONG-TERM CURRENT USE OF INSULIN (H): ICD-10-CM

## 2023-09-01 DIAGNOSIS — R80.9 TYPE 2 DIABETES MELLITUS WITH MICROALBUMINURIA, WITHOUT LONG-TERM CURRENT USE OF INSULIN (H): Primary | ICD-10-CM

## 2023-09-01 RX ORDER — GLIMEPIRIDE 2 MG/1
TABLET ORAL
Qty: 7 TABLET | Refills: 1 | Status: SHIPPED | OUTPATIENT
Start: 2023-09-01 | End: 2023-09-03

## 2023-09-01 NOTE — TELEPHONE ENCOUNTER
Message noted, called patient back.  She had right hip area steroid injection 8/23/23, recent high BG levels.  Today,  mg/dl but 220 mg/dL. We discussed the hyperglycemic effect of steroid medication, management options with 1) observation plan, 2) using a PALAFOX medication such as glimeparide, or 3) using rapid acting insulin correction scale.  We then decided to go with option #2 as glimeparide 2 mg as 1/2-tab daily to achieve -200 mg/dl range.  I sent this Rx to her pharmacy, explained that she would likely need this glimeparide med for 2-3 days then discontinue it.  She agreed, will continue to monitor her BG levels, thanked me for the callback.    CHANCE Thorne MD, MS  Endocrinology  [facility]

## 2023-09-01 NOTE — TELEPHONE ENCOUNTER
[facility]    Phone Message    May a detailed message be left on voicemail: yes     Reason for Call: Medication Question or concern regarding medication   Prescription Clarification  Name of Medication: glimepiride (AMARYL) 2 MG tablet   Prescribing Provider: Hector Thorne MD   Pharmacy: [id number] [id number] [address]   What on the order needs clarification? Please clarify instructions, per Renee it states \"1 daily as directed\" but also \"1/2 tablet ga as directed.\" Please review and advise. Thank you.       Action Taken: Other: Endo    Travel Screening: Not Applicable                                                                   "

## 2023-09-03 DIAGNOSIS — R80.9 TYPE 2 DIABETES MELLITUS WITH MICROALBUMINURIA, WITHOUT LONG-TERM CURRENT USE OF INSULIN (H): ICD-10-CM

## 2023-09-03 DIAGNOSIS — E11.29 TYPE 2 DIABETES MELLITUS WITH MICROALBUMINURIA, WITHOUT LONG-TERM CURRENT USE OF INSULIN (H): ICD-10-CM

## 2023-09-03 RX ORDER — GLIMEPIRIDE 2 MG/1
TABLET ORAL
Qty: 7 TABLET | Refills: 1 | Status: SHIPPED | OUTPATIENT
Start: 2023-09-03

## 2023-09-03 NOTE — TELEPHONE ENCOUNTER
Message reviewed.  The dosing directions listed were confusing... I have sent a revised glimeparide Rx to her pharmacy.    CHANCE Thorne MD, MS  Endocrinology  [facility]

## 2023-09-07 ENCOUNTER — MYC MEDICAL ADVICE (OUTPATIENT)
Dept: ENDOCRINOLOGY | Facility: CLINIC | Age: 73
End: 2023-09-07
Payer: MEDICARE

## 2024-01-06 ENCOUNTER — HEALTH MAINTENANCE LETTER (OUTPATIENT)
Age: 74
End: 2024-01-06

## 2024-01-25 ENCOUNTER — LAB (OUTPATIENT)
Dept: LAB | Facility: CLINIC | Age: 74
End: 2024-01-25
Payer: MEDICARE

## 2024-01-25 DIAGNOSIS — R80.9 TYPE 2 DIABETES MELLITUS WITH MICROALBUMINURIA, WITHOUT LONG-TERM CURRENT USE OF INSULIN (H): ICD-10-CM

## 2024-01-25 DIAGNOSIS — E11.29 TYPE 2 DIABETES MELLITUS WITH MICROALBUMINURIA, WITHOUT LONG-TERM CURRENT USE OF INSULIN (H): ICD-10-CM

## 2024-01-25 LAB
ALT SERPL W P-5'-P-CCNC: 14 U/L (ref 0–50)
ANION GAP SERPL CALCULATED.3IONS-SCNC: 9 MMOL/L (ref 7–15)
BUN SERPL-MCNC: 23 MG/DL (ref 8–23)
CALCIUM SERPL-MCNC: 9.6 MG/DL (ref 8.8–10.2)
CHLORIDE SERPL-SCNC: 102 MMOL/L (ref 98–107)
CREAT SERPL-MCNC: 0.78 MG/DL (ref 0.51–0.95)
CREAT UR-MCNC: 46 MG/DL
DEPRECATED HCO3 PLAS-SCNC: 28 MMOL/L (ref 22–29)
EGFRCR SERPLBLD CKD-EPI 2021: 79 ML/MIN/1.73M2
GLUCOSE SERPL-MCNC: 169 MG/DL (ref 70–99)
HBA1C MFR BLD: 7.2 % (ref 0–5.6)
MICROALBUMIN UR-MCNC: <12 MG/L
MICROALBUMIN/CREAT UR: NORMAL MG/G{CREAT}
POTASSIUM SERPL-SCNC: 4 MMOL/L (ref 3.4–5.3)
SODIUM SERPL-SCNC: 139 MMOL/L (ref 135–145)

## 2024-01-25 PROCEDURE — 80048 BASIC METABOLIC PNL TOTAL CA: CPT

## 2024-01-25 PROCEDURE — 82570 ASSAY OF URINE CREATININE: CPT

## 2024-01-25 PROCEDURE — 83036 HEMOGLOBIN GLYCOSYLATED A1C: CPT

## 2024-01-25 PROCEDURE — 84460 ALANINE AMINO (ALT) (SGPT): CPT

## 2024-01-25 PROCEDURE — 82043 UR ALBUMIN QUANTITATIVE: CPT

## 2024-01-25 PROCEDURE — 36415 COLL VENOUS BLD VENIPUNCTURE: CPT

## 2024-01-31 ENCOUNTER — OFFICE VISIT (OUTPATIENT)
Dept: ENDOCRINOLOGY | Facility: CLINIC | Age: 74
End: 2024-01-31
Payer: MEDICARE

## 2024-01-31 VITALS
BODY MASS INDEX: 26.92 KG/M2 | WEIGHT: 155.6 LBS | DIASTOLIC BLOOD PRESSURE: 83 MMHG | HEART RATE: 79 BPM | SYSTOLIC BLOOD PRESSURE: 171 MMHG

## 2024-01-31 DIAGNOSIS — R80.9 TYPE 2 DIABETES MELLITUS WITH MICROALBUMINURIA, WITHOUT LONG-TERM CURRENT USE OF INSULIN (H): Primary | ICD-10-CM

## 2024-01-31 DIAGNOSIS — E11.29 TYPE 2 DIABETES MELLITUS WITH MICROALBUMINURIA, WITHOUT LONG-TERM CURRENT USE OF INSULIN (H): Primary | ICD-10-CM

## 2024-01-31 PROCEDURE — 99214 OFFICE O/P EST MOD 30 MIN: CPT | Performed by: INTERNAL MEDICINE

## 2024-01-31 PROCEDURE — G2211 COMPLEX E/M VISIT ADD ON: HCPCS | Performed by: INTERNAL MEDICINE

## 2024-01-31 RX ORDER — IRBESARTAN 300 MG/1
300 TABLET ORAL
COMMUNITY
Start: 2023-12-26

## 2024-01-31 RX ORDER — METOPROLOL SUCCINATE 50 MG/1
1 TABLET, EXTENDED RELEASE ORAL DAILY
COMMUNITY
Start: 2023-12-26

## 2024-01-31 RX ORDER — AMLODIPINE BESYLATE 2.5 MG/1
2.5 TABLET ORAL
COMMUNITY
Start: 2024-01-25

## 2024-01-31 NOTE — Clinical Note
"    1/31/2024         RE: Aleja Gudino  [address]  [facility] [address]        Dear Colleague,    Thank you for referring your patient, Aleja Gudino, to the [facility] [place]. Please see a copy of my visit note below.    Recent issues:  Diabetes follow-up evaluation  Tolerating the low dose Ozempic well  Had COVID-19 illness ~7/2023, transient hyperglycemia and took glimeparide   Perceives she has long-COVID symptoms of fatigue and some memory issues  She states she \"runs her day around her morning sugars\"         1993. Diagnosis of diabetes mellitus  ...Details of initial evaluation not available, body wt near 215#  Initial treatment with diet management  1997. Began metformin medication, but GI intolerance  Subsequent use of glimeparide, Tradjenta, basal insulin, Victoza, pioglitazone  2014. Began weight loss plan  5/2019. Discontinued pioglitazone, per plan  11/1/21. Stopped Victoza and changed to Ozempic     Current DM meds:  Ozempic 0.25 mg  Subcutaneous weekly     Using Accucheck BG meter              Tests 1-2x/day   14d avg 126 mg/dl, range 115-146 mg/dl    Previous [facility] labs include:        Previous [facility] hgbA1c trends include:  Lab Results   Component Value Date    A1C 7.2 (H) 01/25/2024    A1C 6.9 (H) 06/26/2023    A1C 6.5 (H) 11/29/2022    A1C 6.3 (H) 06/15/2022    A1C 6.9 (H) 11/26/2021      Recent [facility] labs include:  Lab Results   Component Value Date    A1C 7.2 (H) 01/25/2024     01/25/2024    POTASSIUM 4.0 01/25/2024    CHLORIDE 102 01/25/2024    CO2 28 01/25/2024    ANIONGAP 9 01/25/2024     (H) 01/25/2024    BUN 23.0 01/25/2024    CR 0.78 01/25/2024    GFRESTIMATED 79 01/25/2024    GFRESTBLACK >90 08/28/2019    HUANG 9.6 01/25/2024    CHOL 171 11/29/2022    TRIG 68 11/29/2022    HDL 70 11/29/2022    LDL 87 11/29/2022    NHDL 101 11/29/2022    UCRR 46.0 01/25/2024    MICROL <12.0 01/25/2024    UMALCR  01/25/2024      Comment:      Unable to calculate, " urine albumin and/or urine creatinine is outside detectable limits.  Microalbuminuria is defined as an albumin:creatinine ratio of 17 to 299 for males and 25 to 299 for females. A ratio of albumin:creatinine of 300 or higher is indicative of overt proteinuria.  Due to biologic variability, positive results should be confirmed by a second, first-morning random or 24-hour timed urine specimen. If there is discrepancy, a third specimen is recommended. When 2 out of 3 results are in the microalbuminuria range, this is evidence for incipient nephropathy and warrants increased efforts at glucose control, blood pressure control, and institution of therapy with an angiotensin-converting-enzyme (ACE) inhibitor (if the patient can tolerate it).       Lab Results   Component Value Date    TSH 2.18 2022     Last eye exam with Dr. Matute 2021, no DR  DM Complications: none known        Lives in [place], has 2 grandchildren ages 11 and 8 yoa  Sees Dr. Tanmay Arriola/[facility] for general medicine evaluations.  Also sees Dr. Bush/[facility]       PMH/PSH:  Past Medical History:   Diagnosis Date    Frontal fibrosing alopecia     Glaucoma     Hair loss     Hypertension     Sleep apnea     c-pap    Squamous cell carcinoma     Thyroid nodule     Type 2 diabetes mellitus (H)     Weight loss      Past Surgical History:   Procedure Laterality Date    BIOPSY OF SKIN LESION      CARPAL TUNNEL RELEASE RT/LT       SECTION      CHOLECYSTECTOMY      COLONOSCOPY N/A 3/19/2015    Procedure: COLONOSCOPY;  Surgeon: Ck Childress MD;  Location: [facility]    Excision oral melanotic macule  2013    lower right lip    HC LAP,FULGURATE/EXCISE LESIONS      tumors removed and colon resection    KNEE SURGERY      arthroscopic- left knee    NO HISTORY OF SURGERY  13    derm    ORTHOPEDIC SURGERY      right and left knee replacements    RELEASE TRIGGER FINGER      RESECTION ABDOMINAL PERINEAL          Family Hx:  Family History   Problem Relation Age of Onset    Skin Cancer Father     Cancer No family hx of         No family history of skin cancer    Melanoma No family hx of          Social Hx:  Social History     Socioeconomic History    Marital status:      Spouse name: Not on file    Number of children: Not on file    Years of education: Not on file    Highest education level: Not on file   Occupational History    Not on file   Tobacco Use    Smoking status: Never    Smokeless tobacco: Never   Substance and Sexual Activity    Alcohol use: No     Alcohol/week: 0.0 standard drinks of alcohol    Drug use: No    Sexual activity: Not on file   Other Topics Concern    Parent/sibling w/ CABG, MI or angioplasty before 65F 55M? Not Asked   Social History Narrative    Not on file     Social Determinants of Health     Financial Resource Strain: Not on file   Food Insecurity: Not on file   Transportation Needs: Not on file   Physical Activity: Not on file   Stress: Not on file   Social Connections: Not on file   Interpersonal Safety: Not on file   Housing Stability: Not on file          MEDICATIONS:  has a current medication list which includes the following prescription(s): amlodipine, aspirin, vitamin d3, diclofenac sodium, fluticasone, glimepiride, irbesartan, latanoprost, losartan-hydrochlorothiazide, metoprolol succinate er, omeprazole, piroxicam, ozempic (0.25 or 0.5 mg/dose), tizanidine, blood glucose, blood glucose monitoring, cyanocobalamin, famotidine, bd desire u/f, NONFORMULARY, and NONFORMULARY.       ROS: 10 point ROS neg other than the symptoms noted above in the HPI.     GENERAL: mild fatigue, weight stable 147-152#; denies fevers, chills, night sweats.   HEENT: no dysphagia, odonophagia, diplopia, neck pain  THYROID:  no apparent hyper or hypothyroid symptoms  CV: no chest pain, pressure, palpitations  LUNGS: no SOB, SIDHU, cough, wheezing   ABDOMEN: early satiety, some constipation with BM Q3-4  days; no diarrhea, abdominal pain  EXTREMITIES: no rashes, ulcers, edema  NEUROLOGY: some headaches; denies changes in vision, tingling, extremitiy numbness   MSK: less back pains, some arthralgias of knees; no muscle weakness  SKIN: scalp hair thinning; no rashes or lesions  :  more frequent urination and some bladder leakage; no menses  PSYCH:  stable mood, no significant anxiety or depression  ENDOCRINE: variable heat or cold intolerance     Physical Exam   VS: BP (!) 171/83   Pulse 79   Wt 70.6 kg (155 lb 9.6 oz)   BMI 26.92 kg/m    GENERAL: AXOX3, NAD, well dressed, answering questions appropriately, appears stated age.  ENT: no nose swelling or nasal discharge, mouth redness or gum changes.  EYES: eyes grossly normal to inspection, conjunctivae and sclerae normal, no exophthalmos or proptosis  THYROID:  symmetric, nontender, no goiter or thyroid nodules on exam  LUNGS: no audible wheeze, cough or visible cyanosis, no visible retractions or increased work of breathing  ABDOMEN: abdomen normal size  EXTREMITIES: normal feet exam, pulses R/L DP 3/3; no edema, no lesions  NEUROLOGY: CN grossly intact, no tremors  MSK: grossly intact  SKIN:  wearing wig; no apparent skin lesions, rash, or edema with visualized skin appearance  PSYCH: mentation appears normal, affect normal/bright, judgement and insight intact,   normal speech and appearance well groomed    LABS:     All pertinent notes, labs, and images personally reviewed by me.     A/P:  No diagnosis found.        Comments:  Reviewed health history and diabetes issues.  Recent glycemic control very good, tolerating Ozempic med well  Reviewed and interpreted tests that I previously ordered.   Ordered appropriate tests for the endocrinology disease management.    Management options discussed and implemented after shared medical decision making with the patient.  T2DM problem is chronic-stable    Plan:  Discussed general issues with the diabetes diagnosis and  management  We discussed the hgbA1c test which reflects previous overall glucose levels or control  Discussed the importance of blood glucose (BG) testing to assess glucose trends  Provided general overview of the diabetes medication options and medication treatment plan.     Recommend:  Continue the current Ozempic 0.25 mg + 9-clicks (0.0375 mg equivalent) subcutaneous weekly  She prefers to continue the low dose Ozempic plan vs discontinuing T2DM med treatment  We discussed pen needle options, also the abdomen vs thigh injection site options  Check FBG every other day, goal target  mg/dl  Gave her new Accu-check GuideMe meter, and paper Rx for the TS's  Monitor for symptom changes  Plan repeat nonfasting lab tests in 7/2024   Consider nearby [facility] clinic   Lab orders placed  Keep focus on diet, exercise, weight management.  Advise having fasting lipid panel testing and dilated eye examination, at least annually     Keep neurology appt for the (chronic and intermittent) left dropped foot   Cold left upper arm, vision changes  Patient to follow up with their Primary Care Provider regarding the elevated blood pressure.  Addressed patient questions today     The longitudinal plan of care for the endocrine problem(s) were addressed during this visit.  Due to added complexity of care,   we will continue to support the patient and the subsequent management of this condition with ongoing continuity of care.    There are no Patient Instructions on file for this visit.    Future labs ordered today: No orders of the defined types were placed in this encounter.    Radiology/Consults ordered today: None    Total time spent on day of encounter:  ***    Follow-up:  7/2024, Return    CHANCE Thorne MD, MS  Endocrinology  [facility]                                 Recent issues:  Diabetes follow-up evaluation  Tolerating the low dose Ozempic well  Had COVID-19 illness ~7/2023, transient hyperglycemia and took  "glimeparide   Perceives she has long-COVID symptoms of fatigue and some memory issues  She states she \"runs her day around her morning sugars\"         1993. Diagnosis of diabetes mellitus  ...Details of initial evaluation not available, body wt near 215#  Initial treatment with diet management  1997. Began metformin medication, but GI intolerance  Subsequent use of glimeparide, Tradjenta, basal insulin, Victoza, pioglitazone  2014. Began weight loss plan  5/2019. Discontinued pioglitazone, per plan  11/1/21. Stopped Victoza and changed to Ozempic     Current DM meds:  Ozempic 0.25 mg  Subcutaneous weekly     Using Accucheck BG meter              Tests 1-2x/day   14d avg 126 mg/dl, range 115-146 mg/dl    Previous [facility] labs include:        Previous [facility] hgbA1c trends include:  Lab Results   Component Value Date    A1C 7.2 (H) 01/25/2024    A1C 6.9 (H) 06/26/2023    A1C 6.5 (H) 11/29/2022    A1C 6.3 (H) 06/15/2022    A1C 6.9 (H) 11/26/2021      Recent [facility] labs include:  Lab Results   Component Value Date    A1C 7.2 (H) 01/25/2024     01/25/2024    POTASSIUM 4.0 01/25/2024    CHLORIDE 102 01/25/2024    CO2 28 01/25/2024    ANIONGAP 9 01/25/2024     (H) 01/25/2024    BUN 23.0 01/25/2024    CR 0.78 01/25/2024    GFRESTIMATED 79 01/25/2024    GFRESTBLACK >90 08/28/2019    HUANG 9.6 01/25/2024    CHOL 171 11/29/2022    TRIG 68 11/29/2022    HDL 70 11/29/2022    LDL 87 11/29/2022    NHDL 101 11/29/2022    UCRR 46.0 01/25/2024    MICROL <12.0 01/25/2024    UMALCR  01/25/2024      Comment:      Unable to calculate, urine albumin and/or urine creatinine is outside detectable limits.  Microalbuminuria is defined as an albumin:creatinine ratio of 17 to 299 for males and 25 to 299 for females. A ratio of albumin:creatinine of 300 or higher is indicative of overt proteinuria.  Due to biologic variability, positive results should be confirmed by a second, first-morning random or 24-hour timed urine specimen. If there is " discrepancy, a third specimen is recommended. When 2 out of 3 results are in the microalbuminuria range, this is evidence for incipient nephropathy and warrants increased efforts at glucose control, blood pressure control, and institution of therapy with an angiotensin-converting-enzyme (ACE) inhibitor (if the patient can tolerate it).       Lab Results   Component Value Date    TSH 2.18 2022     Last eye exam with Dr. Matute 23, no DR  DM Complications: none known        Lives in [facility], has 2 grandchildren ages 11 and 8 yoa  Sees Dr. Tanmay Arriola/[facility] for general medicine evaluations.  Also sees Dr. Bush[facility]       PMH/PSH:  Past Medical History:   Diagnosis Date     Frontal fibrosing alopecia      Glaucoma      Hair loss      Hypertension      Sleep apnea     c-pap     Squamous cell carcinoma      Thyroid nodule      Type 2 diabetes mellitus (H)      Weight loss      Past Surgical History:   Procedure Laterality Date     BIOPSY OF SKIN LESION       CARPAL TUNNEL RELEASE RT/LT        SECTION       CHOLECYSTECTOMY       COLONOSCOPY N/A 3/19/2015    Procedure: COLONOSCOPY;  Surgeon: Ck Childress MD;  Location: [facility]     Excision oral melanotic macule  2013    lower right lip     HC LAP,FULGURATE/EXCISE LESIONS      tumors removed and colon resection     KNEE SURGERY      arthroscopic- left knee     NO HISTORY OF SURGERY  11    derm     ORTHOPEDIC SURGERY      right and left knee replacements     RELEASE TRIGGER FINGER       RESECTION ABDOMINAL PERINEAL         Family Hx:  Family History   Problem Relation Age of Onset     Skin Cancer Father      Cancer No family hx of         No family history of skin cancer     Melanoma No family hx of          Social Hx:  Social History     Socioeconomic History     Marital status:      Spouse name: Not on file     Number of children: Not on file     Years of education: Not on file      "Highest education level: Not on file   Occupational History     Not on file   Tobacco Use     Smoking status: Never     Smokeless tobacco: Never   Substance and Sexual Activity     Alcohol use: No     Alcohol/week: 0.0 standard drinks of alcohol     Drug use: No     Sexual activity: Not on file   Other Topics Concern     Parent/sibling w/ CABG, MI or angioplasty before 65F 55M? Not Asked   Social History Narrative     Not on file     Social Determinants of Health     Financial Resource Strain: Not on file   Food Insecurity: Not on file   Transportation Needs: Not on file   Physical Activity: Not on file   Stress: Not on file   Social Connections: Not on file   Interpersonal Safety: Not on file   Housing Stability: Not on file          MEDICATIONS:  has a current medication list which includes the following prescription(s): amlodipine, aspirin, blood glucose, vitamin d3, diclofenac sodium, fluticasone, glimepiride, irbesartan, latanoprost, losartan-hydrochlorothiazide, metoprolol succinate er, omeprazole, piroxicam, ozempic (0.25 or 0.5 mg/dose), tizanidine, blood glucose, blood glucose monitoring, cyanocobalamin, famotidine, bd desire u/f, NONFORMULARY, and NONFORMULARY.       ROS: 10 point ROS neg other than the symptoms noted above in the HPI.     GENERAL: mild fatigue, weight stable 147-152#; denies fevers, chills, night sweats.   HEENT: no dysphagia, odonophagia, diplopia, neck pain  THYROID:  no apparent hyper or hypothyroid symptoms  CV: no chest pain, pressure, palpitations  LUNGS: no SOB, SIDHU, cough, wheezing   ABDOMEN: early satiety, some constipation with BM Q3-4 days; no diarrhea, abdominal pain  EXTREMITIES: intermittent \"cold\" left upper arm; no rashes, ulcers, edema  NEUROLOGY: some headaches, left foot drop; denies changes in vision, tingling, extremitiy numbness   MSK: less back pains, some arthralgias of knees; no muscle weakness  SKIN: scalp hair thinning; no rashes or lesions  :  more frequent " urination and some bladder leakage; no menses  PSYCH:  stable mood, no significant anxiety or depression  ENDOCRINE: variable heat or cold intolerance     Physical Exam   VS: BP (!) 171/83   Pulse 79   Wt 70.6 kg (155 lb 9.6 oz)   BMI 26.92 kg/m    GENERAL: AXOX3, NAD, well dressed, answering questions appropriately, appears stated age.  ENT: no nose swelling or nasal discharge, mouth redness or gum changes.  EYES: eyes grossly normal to inspection, conjunctivae and sclerae normal, no exophthalmos or proptosis  THYROID:  symmetric, nontender, no goiter or thyroid nodules on exam  LUNGS: no audible wheeze, cough or visible cyanosis, no visible retractions or increased work of breathing  ABDOMEN: abdomen normal size  EXTREMITIES: normal feet exam, pulses R/L DP 3/3; no edema, no lesions  NEUROLOGY: CN grossly intact, no tremors  MSK: grossly intact  SKIN:  wearing wig; no apparent skin lesions, rash, or edema with visualized skin appearance  PSYCH: mentation appears normal, affect normal/bright, judgement and insight intact,   normal speech and appearance well groomed    LABS:     All pertinent notes, labs, and images personally reviewed by me.     A/P:  Encounter Diagnosis   Name Primary?     Type 2 diabetes mellitus with microalbuminuria, without long-term current use of insulin (H) Yes       Comments:  Reviewed health history and diabetes issues.  Recent glycemic control very good, tolerating Ozempic med well  Reviewed and interpreted tests that I previously ordered.   Ordered appropriate tests for the endocrinology disease management.    Management options discussed and implemented after shared medical decision making with the patient.  T2DM problem is chronic-stable    Plan:  Discussed general issues with the diabetes diagnosis and management  We discussed the hgbA1c test which reflects previous overall glucose levels or control  Discussed the importance of blood glucose (BG) testing to assess glucose  trends  Provided general overview of the diabetes medication options and medication treatment plan.     Recommend:  Advised trying higher dose Ozempic 0.25 mg + 9-clicks (0.0375 mg equivalent) subcutaneous weekly  We have discussed pen needle options, also the abdomen vs thigh injection site options  Check FBG every other day, goal target  mg/dl  Gave her new Accu-check GuideMe meter, and paper Rx for the TS's  Monitor for symptom changes  Plan repeat nonfasting lab tests in 7/2024   Consider nearby [facility]   Lab orders placed  Keep focus on diet, exercise, weight management.  Advise having fasting lipid panel testing and dilated eye examination, at least annually     Keep neurology appt for the (chronic and intermittent) left dropped foot   Workup for the cold left upper arm (may need CT angio left upper arm), vision changes  Patient to follow up with their Primary Care Provider regarding the elevated blood pressure.  Addressed patient questions today     The longitudinal plan of care for the endocrine problem(s) were addressed during this visit.  Due to added complexity of care,   we will continue to support the patient and the subsequent management of this condition with ongoing continuity of care.    There are no Patient Instructions on file for this visit.    Future labs ordered today:   Orders Placed This Encounter   Procedures     TSH     Hemoglobin A1c     Basic metabolic panel     Radiology/Consults ordered today: None    Total time spent on day of encounter:  30 min    Follow-up:  7/2024, Return    CHANCE Thorne MD, MS  Endocrinology  [facility]                                   Again, thank you for allowing me to participate in the care of your patient.        Sincerely,        Hector Thorne MD

## 2024-01-31 NOTE — PROGRESS NOTES
"Recent issues:  Diabetes follow-up evaluation  Tolerating the low dose Ozempic well  Had COVID-19 illness ~7/2023, transient hyperglycemia and took glimeparide   Perceives she has long-COVID symptoms of fatigue and some memory issues  She states she \"runs her day around her morning sugars\"         1993. Diagnosis of diabetes mellitus  ...Details of initial evaluation not available, body wt near 215#  Initial treatment with diet management  1997. Began metformin medication, but GI intolerance  Subsequent use of glimeparide, Tradjenta, basal insulin, Victoza, pioglitazone  2014. Began weight loss plan  5/2019. Discontinued pioglitazone, per plan  11/1/21. Stopped Victoza and changed to Ozempic     Current DM meds:  Ozempic 0.25 mg  Subcutaneous weekly     Using Accucheck BG meter              Tests 1-2x/day   14d avg 126 mg/dl, range 115-146 mg/dl    Previous [facility] labs include:        Previous [facility] hgbA1c trends include:  Lab Results   Component Value Date    A1C 7.2 (H) 01/25/2024    A1C 6.9 (H) 06/26/2023    A1C 6.5 (H) 11/29/2022    A1C 6.3 (H) 06/15/2022    A1C 6.9 (H) 11/26/2021      Recent [facility] labs include:  Lab Results   Component Value Date    A1C 7.2 (H) 01/25/2024     01/25/2024    POTASSIUM 4.0 01/25/2024    CHLORIDE 102 01/25/2024    CO2 28 01/25/2024    ANIONGAP 9 01/25/2024     (H) 01/25/2024    BUN 23.0 01/25/2024    CR 0.78 01/25/2024    GFRESTIMATED 79 01/25/2024    GFRESTBLACK >90 08/28/2019    HUANG 9.6 01/25/2024    CHOL 171 11/29/2022    TRIG 68 11/29/2022    HDL 70 11/29/2022    LDL 87 11/29/2022    NHDL 101 11/29/2022    UCRR 46.0 01/25/2024    MICROL <12.0 01/25/2024    UMALCR  01/25/2024      Comment:      Unable to calculate, urine albumin and/or urine creatinine is outside detectable limits.  Microalbuminuria is defined as an albumin:creatinine ratio of 17 to 299 for males and 25 to 299 for females. A ratio of albumin:creatinine of 300 or higher is indicative of overt " proteinuria.  Due to biologic variability, positive results should be confirmed by a second, first-morning random or 24-hour timed urine specimen. If there is discrepancy, a third specimen is recommended. When 2 out of 3 results are in the microalbuminuria range, this is evidence for incipient nephropathy and warrants increased efforts at glucose control, blood pressure control, and institution of therapy with an angiotensin-converting-enzyme (ACE) inhibitor (if the patient can tolerate it).       Lab Results   Component Value Date    TSH 2.18 2022     Last eye exam with Dr. Matute 23, no DR  DM Complications: none known        Lives in [facility], has 2 grandchildren ages 11 and 8 yoa  Sees Dr. Tanmay Arriola/[facility] for general medicine evaluations.  Also sees Dr. Bush/[facility]       PMH/PSH:  Past Medical History:   Diagnosis Date    Frontal fibrosing alopecia     Glaucoma     Hair loss     Hypertension     Sleep apnea     c-pap    Squamous cell carcinoma     Thyroid nodule     Type 2 diabetes mellitus (H)     Weight loss      Past Surgical History:   Procedure Laterality Date    BIOPSY OF SKIN LESION      CARPAL TUNNEL RELEASE RT/LT       SECTION      CHOLECYSTECTOMY      COLONOSCOPY N/A 3/19/2015    Procedure: COLONOSCOPY;  Surgeon: Ck Childress MD;  Location: [facility]    Excision oral melanotic macule  2013    lower right lip    HC LAP,FULGURATE/EXCISE LESIONS      tumors removed and colon resection    KNEE SURGERY      arthroscopic- left knee    NO HISTORY OF SURGERY  11    derm    ORTHOPEDIC SURGERY      right and left knee replacements    RELEASE TRIGGER FINGER      RESECTION ABDOMINAL PERINEAL         Family Hx:  Family History   Problem Relation Age of Onset    Skin Cancer Father     Cancer No family hx of         No family history of skin cancer    Melanoma No family hx of          Social Hx:  Social History     Socioeconomic History  "   Marital status:      Spouse name: Not on file    Number of children: Not on file    Years of education: Not on file    Highest education level: Not on file   Occupational History    Not on file   Tobacco Use    Smoking status: Never    Smokeless tobacco: Never   Substance and Sexual Activity    Alcohol use: No     Alcohol/week: 0.0 standard drinks of alcohol    Drug use: No    Sexual activity: Not on file   Other Topics Concern    Parent/sibling w/ CABG, MI or angioplasty before 65F 55M? Not Asked   Social History Narrative    Not on file     Social Determinants of Health     Financial Resource Strain: Not on file   Food Insecurity: Not on file   Transportation Needs: Not on file   Physical Activity: Not on file   Stress: Not on file   Social Connections: Not on file   Interpersonal Safety: Not on file   Housing Stability: Not on file          MEDICATIONS:  has a current medication list which includes the following prescription(s): amlodipine, aspirin, blood glucose, vitamin d3, diclofenac sodium, fluticasone, glimepiride, irbesartan, latanoprost, losartan-hydrochlorothiazide, metoprolol succinate er, omeprazole, piroxicam, ozempic (0.25 or 0.5 mg/dose), tizanidine, blood glucose, blood glucose monitoring, cyanocobalamin, famotidine, bd desire u/f, NONFORMULARY, and NONFORMULARY.       ROS: 10 point ROS neg other than the symptoms noted above in the HPI.     GENERAL: mild fatigue, weight stable 147-152#; denies fevers, chills, night sweats.   HEENT: no dysphagia, odonophagia, diplopia, neck pain  THYROID:  no apparent hyper or hypothyroid symptoms  CV: no chest pain, pressure, palpitations  LUNGS: no SOB, SIDHU, cough, wheezing   ABDOMEN: early satiety, some constipation with BM Q3-4 days; no diarrhea, abdominal pain  EXTREMITIES: intermittent \"cold\" left upper arm; no rashes, ulcers, edema  NEUROLOGY: some headaches, left foot drop; denies changes in vision, tingling, extremitiy numbness   MSK: less back " pains, some arthralgias of knees; no muscle weakness  SKIN: scalp hair thinning; no rashes or lesions  :  more frequent urination and some bladder leakage; no menses  PSYCH:  stable mood, no significant anxiety or depression  ENDOCRINE: variable heat or cold intolerance     Physical Exam   VS: BP (!) 171/83   Pulse 79   Wt 70.6 kg (155 lb 9.6 oz)   BMI 26.92 kg/m    GENERAL: AXOX3, NAD, well dressed, answering questions appropriately, appears stated age.  ENT: no nose swelling or nasal discharge, mouth redness or gum changes.  EYES: eyes grossly normal to inspection, conjunctivae and sclerae normal, no exophthalmos or proptosis  THYROID:  symmetric, nontender, no goiter or thyroid nodules on exam  LUNGS: no audible wheeze, cough or visible cyanosis, no visible retractions or increased work of breathing  ABDOMEN: abdomen normal size  EXTREMITIES: normal feet exam, pulses R/L DP 3/3; no edema, no lesions  NEUROLOGY: CN grossly intact, no tremors  MSK: grossly intact  SKIN:  wearing wig; no apparent skin lesions, rash, or edema with visualized skin appearance  PSYCH: mentation appears normal, affect normal/bright, judgement and insight intact,   normal speech and appearance well groomed    LABS:     All pertinent notes, labs, and images personally reviewed by me.     A/P:  Encounter Diagnosis   Name Primary?    Type 2 diabetes mellitus with microalbuminuria, without long-term current use of insulin (H) Yes       Comments:  Reviewed health history and diabetes issues.  Recent glycemic control very good, tolerating Ozempic med well  Reviewed and interpreted tests that I previously ordered.   Ordered appropriate tests for the endocrinology disease management.    Management options discussed and implemented after shared medical decision making with the patient.  T2DM problem is chronic-stable    Plan:  Discussed general issues with the diabetes diagnosis and management  We discussed the hgbA1c test which reflects  previous overall glucose levels or control  Discussed the importance of blood glucose (BG) testing to assess glucose trends  Provided general overview of the diabetes medication options and medication treatment plan.     Recommend:  Advised trying higher dose Ozempic 0.25 mg + 9-clicks (0.0375 mg equivalent) subcutaneous weekly  We have discussed pen needle options, also the abdomen vs thigh injection site options  Check FBG every other day, goal target  mg/dl  Gave her new Accu-check GuideMe meter, and paper Rx for the TS's  Monitor for symptom changes  Plan repeat nonfasting lab tests in 7/2024   Consider nearby [facility] clinic   Lab orders placed  Keep focus on diet, exercise, weight management.  Advise having fasting lipid panel testing and dilated eye examination, at least annually     Keep neurology appt for the (chronic and intermittent) left dropped foot   Workup for the cold left upper arm (may need CT angio left upper arm), vision changes  Patient to follow up with their Primary Care Provider regarding the elevated blood pressure.  Addressed patient questions today     The longitudinal plan of care for the endocrine problem(s) were addressed during this visit.  Due to added complexity of care,   we will continue to support the patient and the subsequent management of this condition with ongoing continuity of care.    There are no Patient Instructions on file for this visit.    Future labs ordered today:   Orders Placed This Encounter   Procedures    TSH    Hemoglobin A1c    Basic metabolic panel     Radiology/Consults ordered today: None    Total time spent on day of encounter:  30 min    Follow-up:  7/2024, Return    CHANCE Thorne MD, MS  Endocrinology  [facility]

## 2024-02-20 ENCOUNTER — MYC MEDICAL ADVICE (OUTPATIENT)
Dept: ENDOCRINOLOGY | Facility: CLINIC | Age: 74
End: 2024-02-20
Payer: MEDICARE

## 2024-02-20 DIAGNOSIS — E11.29 TYPE 2 DIABETES MELLITUS WITH MICROALBUMINURIA, WITHOUT LONG-TERM CURRENT USE OF INSULIN (H): ICD-10-CM

## 2024-02-20 DIAGNOSIS — R80.9 TYPE 2 DIABETES MELLITUS WITH MICROALBUMINURIA, WITHOUT LONG-TERM CURRENT USE OF INSULIN (H): ICD-10-CM

## 2024-02-21 NOTE — TELEPHONE ENCOUNTER
Last Written Prescription Date:  3/30/23  Last Fill Quantity: 4.5,  # refills: 1   Last office visit: 1/31/2024   Future Office Visit:  7/31/24        Requested Prescriptions   Pending Prescriptions Disp Refills    semaglutide (OZEMPIC (0.25 OR 0.5 MG/DOSE)) 2 MG/1.5ML SOPN pen 4.5 mL 1     Sig: INJECT 0.5MG SUBCUTANEOUSLYWEEKLY       There is no refill protocol information for this order

## 2024-02-22 RX ORDER — SEMAGLUTIDE 1.34 MG/ML
INJECTION, SOLUTION SUBCUTANEOUS
Qty: 4.5 ML | Refills: 1 | Status: SHIPPED | OUTPATIENT
Start: 2024-02-22 | End: 2024-07-31

## 2024-07-10 ENCOUNTER — LAB (OUTPATIENT)
Dept: LAB | Facility: CLINIC | Age: 74
End: 2024-07-10
Payer: MEDICARE

## 2024-07-10 DIAGNOSIS — R80.9 TYPE 2 DIABETES MELLITUS WITH MICROALBUMINURIA, WITHOUT LONG-TERM CURRENT USE OF INSULIN (H): ICD-10-CM

## 2024-07-10 DIAGNOSIS — E11.29 TYPE 2 DIABETES MELLITUS WITH MICROALBUMINURIA, WITHOUT LONG-TERM CURRENT USE OF INSULIN (H): ICD-10-CM

## 2024-07-10 LAB
ANION GAP SERPL CALCULATED.3IONS-SCNC: 9 MMOL/L (ref 7–15)
BUN SERPL-MCNC: 23.1 MG/DL (ref 8–23)
CALCIUM SERPL-MCNC: 9.4 MG/DL (ref 8.8–10.2)
CHLORIDE SERPL-SCNC: 101 MMOL/L (ref 98–107)
CREAT SERPL-MCNC: 0.85 MG/DL (ref 0.51–0.95)
DEPRECATED HCO3 PLAS-SCNC: 28 MMOL/L (ref 22–29)
EGFRCR SERPLBLD CKD-EPI 2021: 71 ML/MIN/1.73M2
GLUCOSE SERPL-MCNC: 155 MG/DL (ref 70–99)
HBA1C MFR BLD: 6.6 % (ref 0–5.6)
POTASSIUM SERPL-SCNC: 4 MMOL/L (ref 3.4–5.3)
SODIUM SERPL-SCNC: 138 MMOL/L (ref 135–145)
TSH SERPL DL<=0.005 MIU/L-ACNC: 2.91 UIU/ML (ref 0.3–4.2)

## 2024-07-10 PROCEDURE — 83036 HEMOGLOBIN GLYCOSYLATED A1C: CPT

## 2024-07-10 PROCEDURE — 84443 ASSAY THYROID STIM HORMONE: CPT

## 2024-07-10 PROCEDURE — 80048 BASIC METABOLIC PNL TOTAL CA: CPT

## 2024-07-10 PROCEDURE — 36415 COLL VENOUS BLD VENIPUNCTURE: CPT

## 2024-07-31 ENCOUNTER — OFFICE VISIT (OUTPATIENT)
Dept: ENDOCRINOLOGY | Facility: CLINIC | Age: 74
End: 2024-07-31
Payer: MEDICARE

## 2024-07-31 VITALS
WEIGHT: 150.4 LBS | BODY MASS INDEX: 26.02 KG/M2 | DIASTOLIC BLOOD PRESSURE: 83 MMHG | HEART RATE: 69 BPM | SYSTOLIC BLOOD PRESSURE: 159 MMHG

## 2024-07-31 DIAGNOSIS — E11.29 TYPE 2 DIABETES MELLITUS WITH MICROALBUMINURIA, WITHOUT LONG-TERM CURRENT USE OF INSULIN (H): ICD-10-CM

## 2024-07-31 DIAGNOSIS — R80.9 TYPE 2 DIABETES MELLITUS WITH MICROALBUMINURIA, WITHOUT LONG-TERM CURRENT USE OF INSULIN (H): Primary | ICD-10-CM

## 2024-07-31 DIAGNOSIS — R80.9 TYPE 2 DIABETES MELLITUS WITH MICROALBUMINURIA, WITHOUT LONG-TERM CURRENT USE OF INSULIN (H): ICD-10-CM

## 2024-07-31 DIAGNOSIS — E11.29 TYPE 2 DIABETES MELLITUS WITH MICROALBUMINURIA, WITHOUT LONG-TERM CURRENT USE OF INSULIN (H): Primary | ICD-10-CM

## 2024-07-31 PROCEDURE — 99214 OFFICE O/P EST MOD 30 MIN: CPT | Performed by: INTERNAL MEDICINE

## 2024-07-31 PROCEDURE — G2211 COMPLEX E/M VISIT ADD ON: HCPCS | Performed by: INTERNAL MEDICINE

## 2024-07-31 RX ORDER — SEMAGLUTIDE 1.34 MG/ML
INJECTION, SOLUTION SUBCUTANEOUS
Qty: 4.5 ML | Refills: 5 | Status: SHIPPED | OUTPATIENT
Start: 2024-07-31

## 2024-07-31 NOTE — PROGRESS NOTES
Recent issues:  Diabetes follow-up evaluation  Health issues past 6-months, described as:  Some left arm numbness and weakness, medical eval and then MRI imaging, EMG, saw neurologist and orthopedist    Cause for symptoms unclear, but left arm symptoms resolved  Low back pain and diagnosis of lumbar spine problem (lumbar stenosis?), also left drop foot, has done PT       1993. Diagnosis of diabetes mellitus  ...Details of initial evaluation not available, body wt near 215#  Initial treatment with diet management  1997. Began metformin medication, but GI intolerance  Subsequent use of glimeparide, Tradjenta, basal insulin, Victoza, pioglitazone  2014. Began weight loss plan  5/2019. Discontinued pioglitazone, per plan  11/1/21. Stopped Victoza and changed to Ozempic     Current DM meds:  Ozempic 0.25 mg + 9 clicks  Subcutaneous weekly     Using Accucheck BG meter              Tests 1-2x/day   14d avg 133 mg/dl, overall range 122-183 mg/dl    Previous [facility] labs include:        Previous [facility] hgbA1c trends include:  Lab Results   Component Value Date    A1C 6.6 (H) 07/10/2024    A1C 7.2 (H) 01/25/2024    A1C 6.9 (H) 06/26/2023    A1C 6.5 (H) 11/29/2022    A1C 6.3 (H) 06/15/2022      Recent [facility] labs include:  Lab Results   Component Value Date    A1C 6.6 (H) 07/10/2024     07/10/2024    POTASSIUM 4.0 07/10/2024    CHLORIDE 101 07/10/2024    CO2 28 07/10/2024    ANIONGAP 9 07/10/2024     (H) 07/10/2024    BUN 23.1 (H) 07/10/2024    CR 0.85 07/10/2024    GFRESTIMATED 71 07/10/2024    GFRESTBLACK >90 08/28/2019    HUANG 9.4 07/10/2024    CHOL 171 11/29/2022    TRIG 68 11/29/2022    HDL 70 11/29/2022    LDL 87 11/29/2022    NHDL 101 11/29/2022    UCRR 46.0 01/25/2024    MICROL <12.0 01/25/2024    UMALCR  01/25/2024      Comment:      Unable to calculate, urine albumin and/or urine creatinine is outside detectable limits.  Microalbuminuria is defined as an albumin:creatinine ratio of 17 to 299 for males and 25 to  299 for females. A ratio of albumin:creatinine of 300 or higher is indicative of overt proteinuria.  Due to biologic variability, positive results should be confirmed by a second, first-morning random or 24-hour timed urine specimen. If there is discrepancy, a third specimen is recommended. When 2 out of 3 results are in the microalbuminuria range, this is evidence for incipient nephropathy and warrants increased efforts at glucose control, blood pressure control, and institution of therapy with an angiotensin-converting-enzyme (ACE) inhibitor (if the patient can tolerate it).       Lab Results   Component Value Date    TSH 2.91 07/10/2024     Last eye exam with Dr. Matute 23, no DR  DM Complications: none known        Lives in [facility], has 2 grandchildren ages 11 and 8 yoa  Se[facility] Dr. Tan[facility]ola/[facility] for general medicine evaluations.  Also sees Dr. Bush/[facility]       PMH/PSH:  Past Medical History:   Diagnosis Date    Frontal fibrosing alopecia     Glaucoma     Hair loss     Hypertension     Sleep apnea     c-pap    Squamous cell carcinoma     Thyroid nodule     Type 2 diabetes mellitus (H)     Weight loss      Past Surgical History:   Procedure Laterality Date    BIOPSY OF SKIN LESION      CARPAL TUNNEL RELEASE RT/LT       SECTION      CHOLECYSTECTOMY      COLONOSCOPY N/A 3/19/2015    Procedure: COLONOSCOPY;  Surgeon: Ck Childress MD;  Location: [facility]    Excision oral melanotic macule  2013    lower right lip    HC LAP,FULGURATE/EXCISE LESIONS      tumors removed and colon resection    KNEE SURGERY      arthroscopic- left knee    NO HISTORY OF SURGERY  11    derm    ORTHOPEDIC SURGERY      right and left knee replacements    RELEASE TRIGGER FINGER      RESECTION ABDOMINAL PERINEAL         Family Hx:  Family History   Problem Relation Age of Onset    Skin Cancer Father     Cancer No family hx of         No family history of skin cancer     Melanoma No family hx of          Social Hx:  Social History     Socioeconomic History    Marital status:      Spouse name: Not on file    Number of children: Not on file    Years of education: Not on file    Highest education level: Not on file   Occupational History    Not on file   Tobacco Use    Smoking status: Never    Smokeless tobacco: Never   Substance and Sexual Activity    Alcohol use: No     Alcohol/week: 0.0 standard drinks of alcohol    Drug use: No    Sexual activity: Not on file   Other Topics Concern    Parent/sibling w/ CABG, MI or angioplasty before 65F 55M? Not Asked   Social History Narrative    Not on file     Social Determinants of Health     Financial Resource Strain: Low Risk  (8/16/2023)    Received from [facility], [facility]    Financial Resource Strain     Difficulty of Paying Living Expenses: 3     Difficulty of Paying Living Expenses: Not on file   Food Insecurity: No Food Insecurity (8/16/2023)    Received from [facility], [facility]    Food Insecurity     Worried About Running Out of Food in the Last Year: 1   Transportation Needs: No Transportation Needs (8/16/2023)    [facility], [facility]    Transportation Needs     Lack of Transportation (Medical): 1   Physical Activity: Not on file   Stress: Not on file   Social Connections: Socially Integrated (8/16/2023)    Received from [facility], [facility] [facility]    Social Connections     Frequency of Communication with Friends and Family: 0   Interpersonal Safety: Not on file   Housing Stability: Low Risk  (8/16/2023)    [facility], [facility]    Housing Stability     Unable to  "Pay for Housing in the Last Year: 1          MEDICATIONS:  has a current medication list which includes the following prescription(s): amlodipine, aspirin, vitamin d3, diclofenac sodium, fluticasone, glimepiride, irbesartan, latanoprost, losartan-hydrochlorothiazide, metoprolol succinate er, omeprazole, piroxicam, ozempic (0.25 or 0.5 mg/dose), blood glucose, blood glucose, blood glucose monitoring, cyanocobalamin, bd desire u/f, NONFORMULARY, NONFORMULARY, and tizanidine.       ROS: 10 point ROS neg other than the symptoms noted above in the HPI.     GENERAL: mild fatigue, mild wt gain; denies fevers, chills, night sweats.   HEENT: no dysphagia, odonophagia, diplopia, neck pain  THYROID:  no apparent hyper or hypothyroid symptoms  CV: no chest pain, pressure, palpitations  LUNGS: no SOB, SIDHU, cough, wheezing   ABDOMEN: early satiety, some constipation; no diarrhea, abdominal pain  EXTREMITIES: intermittent \"cold\" left upper arm; no rashes, ulcers, edema  NEUROLOGY: some headaches, left foot drop; denies changes in vision, tingling, extremitiy numbness   MSK: less back pains, some arthralgias of knees; no muscle weakness  SKIN: scalp hair thinning; no rashes or lesions  :  more frequent urination and some bladder leakage; no menses  PSYCH:  stable mood, no significant anxiety or depression  ENDOCRINE: variable heat or cold intolerance     Physical Exam   VS: BP (!) 159/83   Pulse 69   Wt 68.2 kg (150 lb 6.4 oz)   BMI 26.02 kg/m    GENERAL: AXOX3, NAD, well dressed, answering questions appropriately, appears stated age.  ENT: no nose swelling or nasal discharge, mouth redness or gum changes.  EYES: eyes grossly normal to inspection, conjunctivae and sclerae normal, no exophthalmos or proptosis  THYROID:  symmetric, nontender, no goiter or thyroid nodules on exam  LUNGS: no audible wheeze, cough or visible cyanosis, no visible retractions or increased work of breathing  ABDOMEN: abdomen normal size  EXTREMITIES: " normal feet exam, pulses R/L DP 3/3; no edema, no lesions  NEUROLOGY: CN grossly intact, no tremors  MSK: grossly intact  SKIN:  wearing wig; no apparent skin lesions, rash, or edema with visualized skin appearance  PSYCH: mentation appears normal, affect normal/bright, judgement and insight intact,   normal speech and appearance well groomed    LABS:     All pertinent notes, labs, and images personally reviewed by me.     A/P:  Encounter Diagnosis   Name Primary?    Type 2 diabetes mellitus with microalbuminuria, without long-term current use of insulin (H) Yes     Comments:  Reviewed health history and diabetes issues.  Recent glycemic control still very good, not out of control/worrisome/severe  She's tolerating Ozempic med well  Reviewed and interpreted tests that I previously ordered.   Ordered appropriate tests for the endocrinology disease management.    Management options discussed and implemented after shared medical decision making with the patient.  T2DM problem is chronic-stable    Plan:  Discussed general issues with the diabetes diagnosis and management  We discussed the hgbA1c test which reflects previous overall glucose levels or control  Discussed the importance of blood glucose (BG) testing to assess glucose trends  Provided general overview of the diabetes medication options and medication treatment plan.     Recommend:  Continue the Ozempic but raise dose as 0.5 mg weekly  Monitor for possible GI symptoms  We have discussed pen needle options, also the abdomen vs thigh injection site options  Check FBG every other day, goal target  mg/dl  Plan fasting lab tests in 1/2025   Consider nearby [facility] or [place] clinics   Lab orders placed  Keep focus on diet, exercise, weight management.  Advise having fasting lipid panel testing and dilated eye examination, at least annually     Review the left drop foot problem and management with neurologist  Patient to follow up with their Primary Care  Provider regarding the elevated blood pressure.  Addressed patient questions today     The longitudinal plan of care for the endocrine problem(s) were addressed during this visit.  Due to added complexity of care,   we will continue to support the patient and the subsequent management of this condition with ongoing continuity of care.    There are no Patient Instructions on file for this visit.    Future labs ordered today:   Orders Placed This Encounter   Procedures    Hemoglobin A1c    Basic metabolic panel    Lipid panel reflex to direct LDL Fasting     Radiology/Consults ordered today: None    Total time spent on day of encounter:  22 min    Follow-up:  2/2025, Return    CHANCE Thorne MD, MS  Endocrinology  [facility]

## 2025-01-25 ENCOUNTER — HEALTH MAINTENANCE LETTER (OUTPATIENT)
Age: 75
End: 2025-01-25

## 2025-01-29 ENCOUNTER — LAB (OUTPATIENT)
Dept: LAB | Facility: CLINIC | Age: 75
End: 2025-01-29
Payer: MEDICARE

## 2025-01-29 DIAGNOSIS — E11.29 TYPE 2 DIABETES MELLITUS WITH MICROALBUMINURIA, WITHOUT LONG-TERM CURRENT USE OF INSULIN (H): ICD-10-CM

## 2025-01-29 DIAGNOSIS — R80.9 TYPE 2 DIABETES MELLITUS WITH MICROALBUMINURIA, WITHOUT LONG-TERM CURRENT USE OF INSULIN (H): ICD-10-CM

## 2025-01-29 LAB
ANION GAP SERPL CALCULATED.3IONS-SCNC: 10 MMOL/L (ref 7–15)
BUN SERPL-MCNC: 19.1 MG/DL (ref 8–23)
CALCIUM SERPL-MCNC: 10.1 MG/DL (ref 8.8–10.4)
CHLORIDE SERPL-SCNC: 100 MMOL/L (ref 98–107)
CHOLEST SERPL-MCNC: 177 MG/DL
CREAT SERPL-MCNC: 0.84 MG/DL (ref 0.51–0.95)
EGFRCR SERPLBLD CKD-EPI 2021: 72 ML/MIN/1.73M2
EST. AVERAGE GLUCOSE BLD GHB EST-MCNC: 154 MG/DL
FASTING STATUS PATIENT QL REPORTED: YES
FASTING STATUS PATIENT QL REPORTED: YES
GLUCOSE SERPL-MCNC: 156 MG/DL (ref 70–99)
HBA1C MFR BLD: 7 % (ref 0–5.6)
HCO3 SERPL-SCNC: 25 MMOL/L (ref 22–29)
HDLC SERPL-MCNC: 59 MG/DL
LDLC SERPL CALC-MCNC: 94 MG/DL
NONHDLC SERPL-MCNC: 118 MG/DL
POTASSIUM SERPL-SCNC: 4.4 MMOL/L (ref 3.4–5.3)
SODIUM SERPL-SCNC: 135 MMOL/L (ref 135–145)
TRIGL SERPL-MCNC: 118 MG/DL

## 2025-01-29 PROCEDURE — 83036 HEMOGLOBIN GLYCOSYLATED A1C: CPT

## 2025-01-29 PROCEDURE — 80061 LIPID PANEL: CPT

## 2025-01-29 PROCEDURE — 36415 COLL VENOUS BLD VENIPUNCTURE: CPT

## 2025-01-29 PROCEDURE — 80048 BASIC METABOLIC PNL TOTAL CA: CPT

## 2025-02-05 ENCOUNTER — OFFICE VISIT (OUTPATIENT)
Dept: ENDOCRINOLOGY | Facility: CLINIC | Age: 75
End: 2025-02-05
Payer: MEDICARE

## 2025-02-05 VITALS
BODY MASS INDEX: 27.85 KG/M2 | HEART RATE: 73 BPM | SYSTOLIC BLOOD PRESSURE: 153 MMHG | DIASTOLIC BLOOD PRESSURE: 82 MMHG | WEIGHT: 161 LBS

## 2025-02-05 DIAGNOSIS — R80.9 TYPE 2 DIABETES MELLITUS WITH MICROALBUMINURIA, WITHOUT LONG-TERM CURRENT USE OF INSULIN (H): Primary | ICD-10-CM

## 2025-02-05 DIAGNOSIS — E11.29 TYPE 2 DIABETES MELLITUS WITH MICROALBUMINURIA, WITHOUT LONG-TERM CURRENT USE OF INSULIN (H): Primary | ICD-10-CM

## 2025-02-05 PROCEDURE — 99214 OFFICE O/P EST MOD 30 MIN: CPT | Performed by: INTERNAL MEDICINE

## 2025-02-05 PROCEDURE — G2211 COMPLEX E/M VISIT ADD ON: HCPCS | Performed by: INTERNAL MEDICINE

## 2025-02-05 NOTE — PROGRESS NOTES
Recent issues:  Diabetes follow-up evaluation  No longer has low back pain, yet still has the left drop foot, has done PT  Recent headaches, cause unclear         1993. Diagnosis of diabetes mellitus  ...Details of initial evaluation not available, body wt near 215#  Initial treatment with diet management  1997. Began metformin medication, but GI intolerance  Subsequent use of glimeparide, Tradjenta, basal insulin, Victoza, pioglitazone  2014. Began weight loss plan  5/2019. Discontinued pioglitazone, per plan  11/1/21. Stopped Victoza and changed to Ozempic  Current DM medications:  Ozempic 0.5 Subcutaneous weekly     Using Accucheck BG meter              Tests 1-2x/day   14d avg 140 mg/dl, overall range 125-148 mg/dl  Previous [facility] labs include:        Previous [facility] hgbA1c trends include:  Lab Results   Component Value Date    A1C 7.0 (H) 01/29/2025    A1C 6.6 (H) 07/10/2024    A1C 7.2 (H) 01/25/2024    A1C 6.9 (H) 06/26/2023    A1C 6.5 (H) 11/29/2022      Recent [facility] labs include:  Lab Results   Component Value Date    A1C 7.0 (H) 01/29/2025     01/29/2025    POTASSIUM 4.4 01/29/2025    CHLORIDE 100 01/29/2025    CO2 25 01/29/2025    ANIONGAP 10 01/29/2025     (H) 01/29/2025    BUN 19.1 01/29/2025    CR 0.84 01/29/2025    GFRESTIMATED 72 01/29/2025    GFRESTBLACK >90 08/28/2019    HUANG 10.1 01/29/2025    CHOL 177 01/29/2025    TRIG 118 01/29/2025    HDL 59 01/29/2025    LDL 94 01/29/2025    NHDL 118 01/29/2025    UCRR 46.0 01/25/2024    MICROL <12.0 01/25/2024    UMALCR  01/25/2024      Comment:      Unable to calculate, urine albumin and/or urine creatinine is outside detectable limits.  Microalbuminuria is defined as an albumin:creatinine ratio of 17 to 299 for males and 25 to 299 for females. A ratio of albumin:creatinine of 300 or higher is indicative of overt proteinuria.  Due to biologic variability, positive results should be confirmed by a second, first-morning random or 24-hour timed urine  specimen. If there is discrepancy, a third specimen is recommended. When 2 out of 3 results are in the microalbuminuria range, this is evidence for incipient nephropathy and warrants increased efforts at glucose control, blood pressure control, and institution of therapy with an angiotensin-converting-enzyme (ACE) inhibitor (if the patient can tolerate it).       Lab Results   Component Value Date    TSH 2.91 07/10/2024     Last eye exam with Dr. Matute 23, no DR  DM Complications: none known        Lives in [facility], has 2 grandchildren ages 11 and 8 yoa  Sees Dr. Tanmay Arriola/[facility] for general medicine evaluations.  Also sees Dr. Bush/[facility]       PMH/PSH:  Past Medical History:   Diagnosis Date    Frontal fibrosing alopecia     Glaucoma     Hair loss     Hypertension     Sleep apnea     c-pap    Squamous cell carcinoma     Thyroid nodule     Type 2 diabetes mellitus (H)     Weight loss      Past Surgical History:   Procedure Laterality Date    BIOPSY OF SKIN LESION      CARPAL TUNNEL RELEASE RT/LT       SECTION      CHOLECYSTECTOMY      COLONOSCOPY N/A 3/19/2015    Procedure: COLONOSCOPY;  Surgeon: Ck Childress MD;  Location: [facility]    Excision oral melanotic macule  2013    lower right lip    HC LAP,FULGURATE/EXCISE LESIONS      tumors removed and colon resection    KNEE SURGERY      arthroscopic- left knee    NO HISTORY OF SURGERY  13    derm    ORTHOPEDIC SURGERY      right and left knee replacements    RELEASE TRIGGER FINGER      RESECTION ABDOMINAL PERINEAL         Family Hx:  Family History   Problem Relation Age of Onset    Skin Cancer Father     Cancer No family hx of         No family history of skin cancer    Melanoma No family hx of          Social Hx:  Social History     Socioeconomic History    Marital status:      Spouse name: Not on file    Number of children: Not on file    Years of education: Not on file    Highest  education level: Not on file   Occupational History    Not on file   Tobacco Use    Smoking status: Never    Smokeless tobacco: Never   Substance and Sexual Activity    Alcohol use: No     Alcohol/week: 0.0 standard drinks of alcohol    Drug use: No    Sexual activity: Not on file   Other Topics Concern    Parent/sibling w/ CABG, MI or angioplasty before 65F 55M? Not Asked   Social History Narrative    Not on file     Social Drivers of Health     Financial Resource Strain: Low Risk  (8/16/2023)    [facility], [facility] [employer] [facility]    Financial Resource Strain     Difficulty of Paying Living Expenses: 3     Difficulty of Paying Living Expenses: Not on file   Food Insecurity: No Food Insecurity (8/16/2023)    [facility], [facility]    Food Insecurity     Worried About Running Out of Food in the Last Year: 1   Transportation Needs: No Transportation Needs (8/16/2023)    [facility], [facility] [employer] [facility]    Transportation Needs     Lack of Transportation (Medical): 1   Physical Activity: Not on file   Stress: Not on file   Social Connections: Socially Integrated (8/16/2023)    [facility], [facility] [employer] [facility]    Social Connections     Frequency of Communication with Friends and Family: 0   Interpersonal Safety: Not on file   Housing Stability: Low Risk  (8/16/2023)    [facility], [facility] [employer]  [employer] [facility]    Housing Stability     Unable to Pay for Housing in the Last Year: 1          MEDICATIONS:  has a current medication list which includes the following prescription(s): amlodipine, fluticasone, irbesartan, latanoprost, losartan-hydrochlorothiazide, metoprolol  "succinate er, omeprazole, piroxicam, ozempic (0.25 or 0.5 mg/dose), aspirin, blood glucose, blood glucose, blood glucose monitoring, vitamin d3, cyanocobalamin, diclofenac sodium, glimepiride, bd desire u/f, NONFORMULARY, NONFORMULARY, and tizanidine.       ROS: 10 point ROS neg other than the symptoms noted above in the HPI.     GENERAL: mild fatigue, mild wt gain; denies fevers, chills, night sweats.   HEENT: no dysphagia, odonophagia, diplopia, neck pain  THYROID:  no apparent hyper or hypothyroid symptoms  CV: no chest pain, pressure, palpitations  LUNGS: no SOB, SIDHU, cough, wheezing   ABDOMEN: early satiety, some constipation; no diarrhea, abdominal pain  EXTREMITIES: intermittent \"cold\" left upper arm; no rashes, ulcers, edema  NEUROLOGY: some headaches, left foot drop; denies changes in vision, tingling, extremitiy numbness   MSK: less back pains, some arthralgias of knees; no muscle weakness  SKIN: scalp hair thinning; no rashes or lesions  :  more frequent urination and some bladder leakage; no menses  PSYCH:  stable mood, no significant anxiety or depression  ENDOCRINE: variable heat or cold intolerance     Physical Exam   VS: BP (!) 153/82   Pulse 73   Wt 73 kg (161 lb)   BMI 27.85 kg/m    GENERAL: AXOX3, NAD, well dressed, answering questions appropriately, appears stated age.  ENT: no nose swelling or nasal discharge, mouth redness or gum changes.  EYES: eyes grossly normal to inspection, conjunctivae and sclerae normal, no exophthalmos or proptosis  THYROID:  symmetric, nontender, no goiter or thyroid nodules on exam  LUNGS: no audible wheeze, cough or visible cyanosis, no visible retractions or increased work of breathing  ABDOMEN: abdomen normal size  EXTREMITIES: normal feet exam, pulses R/L DP 3/3; no edema, no lesions  NEUROLOGY: CN grossly intact, no tremors  MSK: grossly intact  SKIN:  wearing wig; no apparent skin lesions, rash, or edema with visualized skin appearance  PSYCH: mentation " appears normal, affect normal/bright, judgement and insight intact,   normal speech and appearance well groomed    LABS:     All pertinent notes, labs, and images personally reviewed by me.     A/P:  Encounter Diagnosis   Name Primary?    Type 2 diabetes mellitus with microalbuminuria, without long-term current use of insulin (H) Yes       Comments:  Reviewed health history and diabetes issues.  Recent glycemic control good, but mild rise in preappt hgbA1c  She's tolerating Ozempic med well  Reviewed and interpreted tests that I previously ordered.   Ordered appropriate tests for the endocrinology disease management.    Management options discussed and implemented after shared medical decision making with the patient.  T2DM problem is chronic-stable    Plan:  Discussed general issues with the diabetes diagnosis and management  We discussed the hgbA1c test which reflects previous overall glucose levels or control  Discussed the importance of blood glucose (BG) testing to assess glucose trends  Provided general overview of the diabetes medication options and medication treatment plan.     Recommend:  Continue the Ozempic but raise dose as 0.5 mg weekly  Monitor for possible GI symptoms  We have discussed pen needle options, also the abdomen vs thigh injection site options  Reviewed glucose testing options  Wear diagnostic Da CGM for additional glucose trend information, Diab Ed Referral placed  Check FBG every other day, goal target  mg/dl  Keep focus on diet, exercise, weight management.  Plan fasting lab tests in 8/2025   Consider nearby [facility] or [facility]   Lab orders placed  Advise having fasting lipid panel testing and dilated eye examination, at least annually     Patient to follow up with their Primary Care Provider regarding the elevated blood pressure.  Also the headache problem   Addressed patient questions today     The longitudinal plan of care for the endocrine problem(s) were  addressed during this visit.  Due to added complexity of care,   we will continue to support the patient and the subsequent management of this condition with ongoing continuity of care.    There are no Patient Instructions on file for this visit.    Future labs ordered today:   Orders Placed This Encounter   Procedures    Hemoglobin A1c    Basic metabolic panel    TSH    Adult Diabetes Education  Referral     Radiology/Consults ordered today: DIABETES EDUCATION ADULT  REFERRAL    Total time spent on day of encounter:  30 min    Follow-up:  8/2025, Return    CHANCE Thorne MD, MS  Endocrinology  [facility]

## 2025-02-12 ENCOUNTER — TRANSFERRED RECORDS (OUTPATIENT)
Dept: HEALTH INFORMATION MANAGEMENT | Facility: CLINIC | Age: 75
End: 2025-02-12
Payer: MEDICARE

## 2025-02-12 LAB — RETINOPATHY: NEGATIVE

## 2025-02-19 ENCOUNTER — ALLIED HEALTH/NURSE VISIT (OUTPATIENT)
Dept: EDUCATION SERVICES | Facility: CLINIC | Age: 75
End: 2025-02-19
Attending: INTERNAL MEDICINE
Payer: MEDICARE

## 2025-02-19 DIAGNOSIS — R80.9 TYPE 2 DIABETES MELLITUS WITH MICROALBUMINURIA, WITHOUT LONG-TERM CURRENT USE OF INSULIN (H): ICD-10-CM

## 2025-02-19 DIAGNOSIS — E11.29 TYPE 2 DIABETES MELLITUS WITH MICROALBUMINURIA, WITHOUT LONG-TERM CURRENT USE OF INSULIN (H): ICD-10-CM

## 2025-02-19 NOTE — PROGRESS NOTES
Diabetes Self-Management Education & Support    Assessment    Patient comments/concerns: Patient does not have any questions or concerns    Professional CGM study indicated for: Difficult to manage hypoglycemia and/or hyperglycemia     Intervention  Sensor started today.     Sensor Type: LibrePro  Lot #: [id number]  Serial #: [id number]  Expiration Date: 04/30/25       Sensor was inserted with no resistance or bleeding at insertion site.      Pt will plan to wear the sensor through 3/5/25    Da Pro-specific education provided on: Sensor insertion, intention of monitoring for 14 days. Keep records of BG, food intake, exercise, and medication dosing during wear.    Patient verbalized understanding of diabetes self-management education concepts discussed, opportunities for ongoing education and support, and recommendations provided today.    Educational and other materials:  Food/exercise/medication log sheets    Plan  Patient was given instructions for tracking blood glucose medications, food intake and activity.  Patient to return all items associated with the professional Continuous Glucose Monitor System.  See Patient Instructions.    Follow-up:  Upcoming Diabetes Ed Appointments     Visit Type Date Time Department    DIABETES ED 2/19/2025  9:15 AM [facility] DIABETES ED    DIABETES ED 3/5/2025 10:30 AM [facility] DIABETES ED        Subjective/Objective  Aleja is an 74 year old year old, presenting for the following diabetes education related to: Professional CGM Start    Lab Results:  Lab Results   Component Value Date    A1C 7.0 01/29/2025    A1C 6.9 01/08/2020       Medication:  Diabetes Medication(s)       Sulfonylureas       glimepiride (AMARYL) 2 MG tablet Take 1/2 to full tablet by mouth daily as directed     Patient not taking: Reported on 2/5/2025       Incretin Mimetic Agents       semaglutide (OZEMPIC, 0.25 OR 0.5 MG/DOSE,) 2 MG/1.5ML SOPN pen INJECT 0.5MG SUBCUTANEOUSLYWEEKLY              Fanta Ross RN  Time  spent in DSMT: 0 minutes   Time spent in CGM insertion: 20 minutes, in addition to time spent in DSMT  Encounter Type: Individual

## 2025-02-19 NOTE — PATIENT INSTRUCTIONS
1. Plan to wear the LibrePro sensor for 14 days. It is okay to shower, bathe, and swim (up to 3 feet deep for 30 minutes)    2. Continue with your usual diabetes care plan - check blood sugars and take medicines, as prescribed.    3. Keep a log of what you eat and drink, when you take your medications and how much you take, and exercise you do while you are wearing the sensor.    4. Do not cover the sensor with extra adhesive (the small hole in the center of the sensor must remain uncovered)    5. Use a little extra care, especially when getting dressed or exercising, to avoid accidentally loosening or removing the sensor.     6. Remove the sensor if you need to have an MRI or CT scan.     If the LibrePro sensor comes off early, place it in a plastic bag or envelope and call your diabetes educator or bring it with you to your follow-up visit.     Follow-up appointment: 3/5/25 at 10:30am    [place] [facility] for the [facility]:  For Your Diabetes Education and Nutrition Appointments Call:  [phone]   For Diabetes Education or Nutrition Related Questions:   Phone: [phone]  Send [employer] Message   If you need a medication refill please contact your pharmacy. Please allow 3 business days for your refills to be completed.

## 2025-02-19 NOTE — LETTER
2/19/2025         RE: Aleja Gudino  [address]  [place] [address]        Dear Colleague,    Thank you for referring your patient, Aleja Gudino, to the [facility]. Please see a copy of my visit note below.    Diabetes Self-Management Education & Support    Assessment    Patient comments/concerns: Patient does not have any questions or concerns    Professional CGM study indicated for: Difficult to manage hypoglycemia and/or hyperglycemia     Intervention  Sensor started today.     Sensor Type: LibrePro  Lot #: [id number]  Serial #: [id number]  Expiration Date: 04/30/25       Sensor was inserted with no resistance or bleeding at insertion site.      Pt will plan to wear the sensor through 3/5/25    Da Pro-specific education provided on: Sensor insertion, intention of monitoring for 14 days. Keep records of BG, food intake, exercise, and medication dosing during wear.    Patient verbalized understanding of diabetes self-management education concepts discussed, opportunities for ongoing education and support, and recommendations provided today.    Educational and other materials:  Food/exercise/medication log sheets    Plan  Patient was given instructions for tracking blood glucose medications, food intake and activity.  Patient to return all items associated with the professional Continuous Glucose Monitor System.  See Patient Instructions.    Follow-up:  Upcoming Diabetes Ed Appointments     Visit Type Date Time Department    DIABETES ED 2/19/2025  9:15 AM CR DIABETES ED    DIABETES ED 3/5/2025 10:30 AM [facility] DIABETES ED        Subjective/Objective  Aleja is an 74 year old year old, presenting for the following diabetes education related to: Professional CGM Start    Lab Results:  Lab Results   Component Value Date    A1C 7.0 01/29/2025    A1C 6.9 01/08/2020       Medication:  Diabetes Medication(s)       Sulfonylureas       glimepiride (AMARYL) 2 MG tablet Take 1/2 to  full tablet by mouth daily as directed     Patient not taking: Reported on 2/5/2025       Incretin Mimetic Agents       semaglutide (OZEMPIC, 0.25 OR 0.5 MG/DOSE,) 2 MG/1.5ML SOPN pen INJECT 0.5MG SUBCUTANEOUSLYWEEKLY              Fanta Ross RN  Time spent in DSMT: 0 minutes   Time spent in CGM insertion: 20 minutes, in addition to time spent in DSMT  Encounter Type: Individual

## 2025-03-04 ENCOUNTER — TELEPHONE (OUTPATIENT)
Dept: EDUCATION SERVICES | Facility: CLINIC | Age: 75
End: 2025-03-04
Payer: MEDICARE

## 2025-03-04 NOTE — TELEPHONE ENCOUNTER
The patient thanks she might have covid and don't know if she needs to come in to address other concerns about glucose meter, pleaser review and follow up thank you.

## 2025-03-04 NOTE — TELEPHONE ENCOUNTER
"Spoke with patient. She states she is not feeling the best. She thinks she could come in just to get the sensor off but \"I'm not in any condition to debrief the results.\" Advised pt she may remove the sensor herself and we can get her rescheduled. Advised pt to bring sensor to follow up appt. She states she will. Scheduled for n/a opening 3/19/25.   "

## 2025-03-22 ENCOUNTER — HEALTH MAINTENANCE LETTER (OUTPATIENT)
Age: 75
End: 2025-03-22

## 2025-04-03 ENCOUNTER — ALLIED HEALTH/NURSE VISIT (OUTPATIENT)
Dept: EDUCATION SERVICES | Facility: CLINIC | Age: 75
End: 2025-04-03
Payer: MEDICARE

## 2025-04-03 DIAGNOSIS — R80.9 TYPE 2 DIABETES MELLITUS WITH MICROALBUMINURIA, WITHOUT LONG-TERM CURRENT USE OF INSULIN (H): Primary | ICD-10-CM

## 2025-04-03 DIAGNOSIS — E11.29 TYPE 2 DIABETES MELLITUS WITH MICROALBUMINURIA, WITHOUT LONG-TERM CURRENT USE OF INSULIN (H): Primary | ICD-10-CM

## 2025-04-03 NOTE — LETTER
4/3/2025         RE: Aleja Gudino  [address]  [facility] [address]        Dear Colleague,    Thank you for referring your patient, Aleja Gudino, to the [facility]. Please see a copy of my visit note below.    Diabetes Self-Management Education & Support    Presents for: CGM Review    Type of Service: In Person Visit      Assessment  Patient is motivated to make lifestyle changes to manage her diabetes. She feels confident the information on the CGM report will help her make appropriate changes.     See CGM Reports in Monitoring section below.  Glucose Patterns & Trends:    Review of CGM report. Glucose overall average 139mg/dl,  in target 90%, above target 10% and below target 0% of the time.   Pattern of post meal hyperglycemia, with banana bread at breakfast and higher carbohydrate meals, and or adding chocolate chip cookies.      Care Plan and Education Provided:  Healthy Eating: Balanced meals, Consistency in amount and timing of carbohydrate intake, Plate planning method, and Portion control, Being Active: Finding a physical activity routine that works for you, Precautions to take with exercise, and Relationship of activity to glucose, Monitoring: Frequency of monitoring and Individual glucose targets, Taking Medication: Action of prescribed medication(s), Problem Solving: High glucose - causes, signs/symptoms, treatment and prevention, Low glucose - causes, signs/symptoms, treatment and prevention, and When to call a health care provider, Reducing Risks: Complications of diabetes, and Healthy Coping: Benefits of making appropriate lifestyle changes    Patient verbalized understanding of diabetes self-management education concepts discussed, opportunities for ongoing education and support, and recommendations provided today.    Plan  Meal Plan Recommendation: eat 3 meals a day, have small snacks between meals, if needed, use portion control, use plate planning  "method.  -try a smaller piece of banana bread  -try making your own breakfast sandwich vs a store bought breakfast sandwich.  -watch the portion of cookies  Exercise / activity plan: try adding in some daily exercise- refer to the AHA strength, balance and flexibility handout  Check blood sugars fasting and if you have symptoms of low blood sugar  Medication: no change    Follow up:  Follow-up for annual diabetes education review in 1 year or sooner, if needed.    Follow-up:  Upcoming Diabetes Ed Appointments     Visit Type Date Time Department    DIABETES ED 4/3/2025  9:15 AM [facility] DIABETES ED        See Care Plan for co-developed, patient-state behavior change goals.    Education Materials Provided:  -  [facility] Understanding Diabetes Booklet   - My Plate Planner   AHA exercise sheets      Subjective/Objective  Aleja is an 74 year old year old, presenting for the following diabetes education related to: Presents for: CGM Review  Accompanied by: Self  Diabetes education in the past 24mo: No  Focus of Visit: CGM  Type of CGM visit: Professional CGM  Diabetes type: Type 2  Disease course: Stable  How confident are you filling out medical forms by yourself:: Extremely  Transportation concerns: No  Difficulty affording diabetes medication?: No  Difficulty affording diabetes testing supplies?: No  Other concerns:: None  Cultural Influences/Ethnic Background:  Not  or       Diabetes Symptoms & Complications:  Diabetes Related Symptoms: Polydipsia (increased thirst), Polyuria (increased urination), Visual change  Weight trend: Increasing  Symptom course: Stable  Disease course: Stable       Patient Problem List and Family Medical History reviewed for relevant medical history, current medical status, and diabetes risk factors.    Vitals:  There were no vitals taken for this visit.  Estimated body mass index is 27.85 kg/m  as calculated from the following:    Height as of 7/8/20: 1.619 m (5' 3.75\").    " "Weight as of 2/5/25: 73 kg (161 lb).   Last 3 BP:   BP Readings from Last 3 Encounters:   02/05/25 (!) 153/82   07/31/24 (!) 159/83   01/31/24 (!) 171/83       History   Smoking Status     Never   Smokeless Tobacco     Never       Labs:  Lab Results   Component Value Date    A1C 7.0 01/29/2025    A1C 6.9 01/08/2020     Lab Results   Component Value Date     01/29/2025     06/15/2022     08/28/2019     Lab Results   Component Value Date    LDL 94 01/29/2025     01/08/2020     HDL Cholesterol   Date Value Ref Range Status   01/08/2020 75 >49 mg/dL Final     Direct Measure HDL   Date Value Ref Range Status   01/29/2025 59 >=50 mg/dL Final   ]  GFR Estimate   Date Value Ref Range Status   01/29/2025 72 >60 mL/min/1.73m2 Final     Comment:     eGFR calculated using 2021 CKD-EPI equation.   08/28/2019 82 >60 mL/min/[1.73_m2] Final     Comment:     Non  GFR Calc  Starting 12/18/2018, serum creatinine based estimated GFR (eGFR) will be   calculated using the Chronic Kidney Disease Epidemiology Collaboration   (CKD-EPI) equation.       GFR Estimate If Black   Date Value Ref Range Status   08/28/2019 >90 >60 mL/min/[1.73_m2] Final     Comment:      GFR Calc  Starting 12/18/2018, serum creatinine based estimated GFR (eGFR) will be   calculated using the Chronic Kidney Disease Epidemiology Collaboration   (CKD-EPI) equation.       Lab Results   Component Value Date    CR 0.84 01/29/2025    CR 0.73 08/28/2019     No results found for: \"MICROALBUMIN\"    Healthy Eating:  Healthy Eating Assessed Today: Yes  Cultural/Yazidi diet restrictions?: No  How many times a week on average do you eat food made away from home (restaurant/take-out)?: 1  Meals include: Breakfast, Lunch, Dinner, Afternoon Snack  Beverages: Water, Diet soda              Being Active:  Being Active Assessed Today: Yes  Exercise:: Yes  Days per week of moderate to strenuous exercise (like a brisk walk): " 7  On average, minutes per day of exercise at this level: 20  How intense was your typical exercise? : Light (like stretching or slow walking)  Exercise Minutes per Week: 140  Barrier to exercise: Safety    Monitoring:  Monitoring Assessed Today: Yes  Did patient bring glucose meter to appointment? : No  Blood Glucose Meter: Accu-chek  Times checking blood sugar at home (number): 1  Times checking blood sugar at home (per): Day                Taking Medications:  Diabetes Medication(s)       Sulfonylureas       glimepiride (AMARYL) 2 MG tablet Take 1/2 to full tablet by mouth daily as directed     Patient not taking: Reported on 2/5/2025       Incretin Mimetic Agents       semaglutide (OZEMPIC, 0.25 OR 0.5 MG/DOSE,) 2 MG/1.5ML SOPN pen INJECT 0.5MG SUBCUTANEOUSLYWEEKLY          Taking Medication Assessed Today: Yes  Current Treatments: Non-insulin Injectables  Problems taking diabetes medications regularly?: No  Diabetes medication side effects?: No    Problem Solving:  Problem Solving Assessed Today: Yes  Is the patient at risk for hypoglycemia?: No        Reducing Risks:  Reducing Risks Assessed Today: Yes  Diabetes Risks: Age over 45 years, Sedentary Lifestyle  CAD Risks: Diabetes Mellitus, Post-menopausal, Family history, Hypertension  Has dilated eye exam at least once a year?: Yes  Sees dentist every 6 months?: Yes  Feet checked by healthcare provider in the last year?: Yes    Healthy Coping:  Healthy Coping Assessed Today: Yes  Emotional response to diabetes: Ready to learn  Informal Support system:: Family, Friends  Stage of change: ACTION (Actively working towards change)    Fanta Ross RN  Time Spent: 45 minutes  Encounter Type: Individual    Any diabetes medication dose changes were made via the CDCES Standing Orders under the patient's referring provider.

## 2025-04-03 NOTE — PATIENT INSTRUCTIONS
Care Plan:  Meal Plan Recommendation: eat 3 meals a day, have small snacks between meals, if needed, use portion control, use plate planning method.  -try a smaller piece of banana bread  -try making your own breakfast sandwich vs a store bought breakfast sandwich.  -watch the portion of cookies  Exercise / activity plan: try adding in some daily exercise- refer to the AHA strength, balance and flexibility handout  Check blood sugars fasting and if you have symptoms of low blood sugar  Medication: no change    Follow up:  Follow-up for annual diabetes education review in 1 year or sooner, if needed.     Bring blood glucose meter and logbook with you to all doctor and follow-up appointments.     [facility] [place] [facility]:  For Your Diabetes or Nutrition Education Appointments Call:  [phone]   For Diabetes or Nutrition Related Questions:   [phone]  Send [employer] Message   If you need a medication refill please contact your pharmacy. Please allow 3 business days for your refills to be completed.

## 2025-04-03 NOTE — PROGRESS NOTES
Diabetes Self-Management Education & Support    Presents for: CGM Review    Type of Service: In Person Visit      Assessment  Patient is motivated to make lifestyle changes to manage her diabetes. She feels confident the information on the CGM report will help her make appropriate changes.     See CGM Reports in Monitoring section below.  Glucose Patterns & Trends:    Review of CGM report. Glucose overall average 139mg/dl,  in target 90%, above target 10% and below target 0% of the time.   Pattern of post meal hyperglycemia, with banana bread at breakfast and higher carbohydrate meals, and or adding chocolate chip cookies.      Care Plan and Education Provided:  Healthy Eating: Balanced meals, Consistency in amount and timing of carbohydrate intake, Plate planning method, and Portion control, Being Active: Finding a physical activity routine that works for you, Precautions to take with exercise, and Relationship of activity to glucose, Monitoring: Frequency of monitoring and Individual glucose targets, Taking Medication: Action of prescribed medication(s), Problem Solving: High glucose - causes, signs/symptoms, treatment and prevention, Low glucose - causes, signs/symptoms, treatment and prevention, and When to call a health care provider, Reducing Risks: Complications of diabetes, and Healthy Coping: Benefits of making appropriate lifestyle changes    Patient verbalized understanding of diabetes self-management education concepts discussed, opportunities for ongoing education and support, and recommendations provided today.    Plan  Meal Plan Recommendation: eat 3 meals a day, have small snacks between meals, if needed, use portion control, use plate planning method.  -try a smaller piece of banana bread  -try making your own breakfast sandwich vs a store bought breakfast sandwich.  -watch the portion of cookies  Exercise / activity plan: try adding in some daily exercise- refer to the AHA strength, balance and  "flexibility handout  Check blood sugars fasting and if you have symptoms of low blood sugar  Medication: no change    Follow up:  Follow-up for annual diabetes education review in 1 year or sooner, if needed.    Follow-up:  Upcoming Diabetes Ed Appointments     Visit Type Date Time Department    DIABETES ED 4/3/2025  9:15 AM  DIABETES ED        See Care Plan for co-developed, patient-state behavior change goals.    Education Materials Provided:  - [employer] Understanding Diabetes Booklet   - My Plate Planner   AHA exercise sheets      Subjective/Objective  Aleja is an 74 year old year old, presenting for the following diabetes education related to: Presents for: CGM Review  Accompanied by: Self  Diabetes education in the past 24mo: No  Focus of Visit: CGM  Type of CGM visit: Professional CGM  Diabetes type: Type 2  Disease course: Stable  How confident are you filling out medical forms by yourself:: Extremely  Transportation concerns: No  Difficulty affording diabetes medication?: No  Difficulty affording diabetes testing supplies?: No  Other concerns:: None  Cultural Influences/Ethnic Background:  Not  or       Diabetes Symptoms & Complications:  Diabetes Related Symptoms: Polydipsia (increased thirst), Polyuria (increased urination), Visual change  Weight trend: Increasing  Symptom course: Stable  Disease course: Stable       Patient Problem List and Family Medical History reviewed for relevant medical history, current medical status, and diabetes risk factors.    Vitals:  There were no vitals taken for this visit.  Estimated body mass index is 27.85 kg/m  as calculated from the following:    Height as of 7/8/20: 1.619 m (5' 3.75\").    Weight as of 2/5/25: 73 kg (161 lb).   Last 3 BP:   BP Readings from Last 3 Encounters:   02/05/25 (!) 153/82   07/31/24 (!) 159/83   01/31/24 (!) 171/83       History   Smoking Status    Never   Smokeless Tobacco    Never       Labs:  Lab Results   Component " "Value Date    A1C 7.0 01/29/2025    A1C 6.9 01/08/2020     Lab Results   Component Value Date     01/29/2025     06/15/2022     08/28/2019     Lab Results   Component Value Date    LDL 94 01/29/2025     01/08/2020     HDL Cholesterol   Date Value Ref Range Status   01/08/2020 75 >49 mg/dL Final     Direct Measure HDL   Date Value Ref Range Status   01/29/2025 59 >=50 mg/dL Final   ]  GFR Estimate   Date Value Ref Range Status   01/29/2025 72 >60 mL/min/1.73m2 Final     Comment:     eGFR calculated using 2021 CKD-EPI equation.   08/28/2019 82 >60 mL/min/[1.73_m2] Final     Comment:     Non  GFR Calc  Starting 12/18/2018, serum creatinine based estimated GFR (eGFR) will be   calculated using the Chronic Kidney Disease Epidemiology Collaboration   (CKD-EPI) equation.       GFR Estimate If Black   Date Value Ref Range Status   08/28/2019 >90 >60 mL/min/[1.73_m2] Final     Comment:      GFR Calc  Starting 12/18/2018, serum creatinine based estimated GFR (eGFR) will be   calculated using the Chronic Kidney Disease Epidemiology Collaboration   (CKD-EPI) equation.       Lab Results   Component Value Date    CR 0.84 01/29/2025    CR 0.73 08/28/2019     No results found for: \"MICROALBUMIN\"    Healthy Eating:  Healthy Eating Assessed Today: Yes  Cultural/Buddhist diet restrictions?: No  How many times a week on average do you eat food made away from home (restaurant/take-out)?: 1  Meals include: Breakfast, Lunch, Dinner, Afternoon Snack  Beverages: Water, Diet soda              Being Active:  Being Active Assessed Today: Yes  Exercise:: Yes  Days per week of moderate to strenuous exercise (like a brisk walk): 7  On average, minutes per day of exercise at this level: 20  How intense was your typical exercise? : Light (like stretching or slow walking)  Exercise Minutes per Week: 140  Barrier to exercise: Safety    Monitoring:  Monitoring Assessed Today: Yes  Did " patient bring glucose meter to appointment? : No  Blood Glucose Meter: Accu-chek  Times checking blood sugar at home (number): 1  Times checking blood sugar at home (per): Day                Taking Medications:  Diabetes Medication(s)       Sulfonylureas       glimepiride (AMARYL) 2 MG tablet Take 1/2 to full tablet by mouth daily as directed     Patient not taking: Reported on 2/5/2025       Incretin Mimetic Agents       semaglutide (OZEMPIC, 0.25 OR 0.5 MG/DOSE,) 2 MG/1.5ML SOPN pen INJECT 0.5MG SUBCUTANEOUSLYWEEKLY          Taking Medication Assessed Today: Yes  Current Treatments: Non-insulin Injectables  Problems taking diabetes medications regularly?: No  Diabetes medication side effects?: No    Problem Solving:  Problem Solving Assessed Today: Yes  Is the patient at risk for hypoglycemia?: No        Reducing Risks:  Reducing Risks Assessed Today: Yes  Diabetes Risks: Age over 45 years, Sedentary Lifestyle  CAD Risks: Diabetes Mellitus, Post-menopausal, Family history, Hypertension  Has dilated eye exam at least once a year?: Yes  Sees dentist every 6 months?: Yes  Feet checked by healthcare provider in the last year?: Yes    Healthy Coping:  Healthy Coping Assessed Today: Yes  Emotional response to diabetes: Ready to learn  Informal Support system:: Family, Friends  Stage of change: ACTION (Actively working towards change)    Fanta Ross RN  Time Spent: 45 minutes  Encounter Type: Individual    Any diabetes medication dose changes were made via the CDCES Standing Orders under the patient's referring provider.

## 2025-05-03 ENCOUNTER — HEALTH MAINTENANCE LETTER (OUTPATIENT)
Age: 75
End: 2025-05-03

## 2025-06-16 DIAGNOSIS — R80.9 TYPE 2 DIABETES MELLITUS WITH MICROALBUMINURIA, WITHOUT LONG-TERM CURRENT USE OF INSULIN (H): Primary | ICD-10-CM

## 2025-06-16 DIAGNOSIS — E11.29 TYPE 2 DIABETES MELLITUS WITH MICROALBUMINURIA, WITHOUT LONG-TERM CURRENT USE OF INSULIN (H): Primary | ICD-10-CM

## 2025-06-16 RX ORDER — BLOOD SUGAR DIAGNOSTIC
STRIP MISCELLANEOUS
Qty: 100 STRIP | Refills: 3 | Status: SHIPPED | OUTPATIENT
Start: 2025-06-16

## 2025-06-16 NOTE — TELEPHONE ENCOUNTER
Requested Prescriptions   Pending Prescriptions Disp Refills    ACCU-CHEK GUIDE TEST test strip [Pharmacy Med Name: ACCU-CHEK GUIDE TEST STRIP] 100 strip 3     Si-12 PER INSUR. USE TO TEST BLOOD SUGARS ONCE DAILY OR AS DIRECTED       Diabetic Supplies Protocol Failed - 2025 10:16 AM        Failed - Medication is active on med list and the sig matches. RN to manually verify dose and sig if red X/fail.     If the protocol passes (green check), you do not need to verify med dose and sig.    A prescription matches if they are the same clinical intention.    For Example: once daily and every morning are the same.    The protocol can not identify upper and lower case letters as matching and will fail.     For Example: Take 1 tablet (50 mg) by mouth daily     TAKE 1 TABLET (50 MG) BY MOUTH DAILY    For all fails (red x), verify dose and sig.    If the refill does match what is on file, the RN can still proceed to approve the refill request.       If they do not match, route to the appropriate provider.             Failed - Medication indicated for associated diagnosis        Passed - Recent (12 month) or future (90 days) visit with authorizing provider's specialty (provided they have been seen in the past 15 months)     The patient must have completed an in-person or virtual visit within the past 12 months or has a future visit scheduled within the next 90 days with the authorizing provider s specialty.  Urgent care and e-visits do not qualify as an office visit for this protocol.          Passed - Patient is 18 years of age or older           Last Written Prescription Date:  24  Last Fill Quantity: 100 strip,  # refills: 3   Last office visit: 2025 ; last virtual visit: Visit date not found with prescribing provider:  Dr Thorne  Future Office Visit:  25    Refill sent  Dequan Lantigua RN

## 2025-06-18 ENCOUNTER — TRANSFERRED RECORDS (OUTPATIENT)
Dept: HEALTH INFORMATION MANAGEMENT | Facility: CLINIC | Age: 75
End: 2025-06-18
Payer: MEDICARE

## 2025-08-06 ENCOUNTER — LAB (OUTPATIENT)
Dept: LAB | Facility: CLINIC | Age: 75
End: 2025-08-06
Payer: MEDICARE

## 2025-08-06 DIAGNOSIS — R80.9 TYPE 2 DIABETES MELLITUS WITH MICROALBUMINURIA, WITHOUT LONG-TERM CURRENT USE OF INSULIN (H): ICD-10-CM

## 2025-08-06 DIAGNOSIS — E11.29 TYPE 2 DIABETES MELLITUS WITH MICROALBUMINURIA, WITHOUT LONG-TERM CURRENT USE OF INSULIN (H): ICD-10-CM

## 2025-08-06 LAB
ANION GAP SERPL CALCULATED.3IONS-SCNC: 10 MMOL/L (ref 7–15)
BUN SERPL-MCNC: 23.1 MG/DL (ref 8–23)
CALCIUM SERPL-MCNC: 9.4 MG/DL (ref 8.8–10.4)
CHLORIDE SERPL-SCNC: 102 MMOL/L (ref 98–107)
CREAT SERPL-MCNC: 0.84 MG/DL (ref 0.51–0.95)
EGFRCR SERPLBLD CKD-EPI 2021: 72 ML/MIN/1.73M2
EST. AVERAGE GLUCOSE BLD GHB EST-MCNC: 163 MG/DL
GLUCOSE SERPL-MCNC: 176 MG/DL (ref 70–99)
HBA1C MFR BLD: 7.3 % (ref 0–5.6)
HCO3 SERPL-SCNC: 26 MMOL/L (ref 22–29)
POTASSIUM SERPL-SCNC: 4.6 MMOL/L (ref 3.4–5.3)
SODIUM SERPL-SCNC: 138 MMOL/L (ref 135–145)
TSH SERPL DL<=0.005 MIU/L-ACNC: 1.61 UIU/ML (ref 0.3–4.2)

## 2025-08-06 PROCEDURE — 36415 COLL VENOUS BLD VENIPUNCTURE: CPT

## 2025-08-06 PROCEDURE — 83036 HEMOGLOBIN GLYCOSYLATED A1C: CPT

## 2025-08-06 PROCEDURE — 80048 BASIC METABOLIC PNL TOTAL CA: CPT

## 2025-08-06 PROCEDURE — 84443 ASSAY THYROID STIM HORMONE: CPT

## 2025-08-13 ENCOUNTER — OFFICE VISIT (OUTPATIENT)
Dept: ENDOCRINOLOGY | Facility: CLINIC | Age: 75
End: 2025-08-13
Payer: MEDICARE

## 2025-08-13 ENCOUNTER — TELEPHONE (OUTPATIENT)
Dept: ENDOCRINOLOGY | Facility: CLINIC | Age: 75
End: 2025-08-13

## 2025-08-13 VITALS
DIASTOLIC BLOOD PRESSURE: 87 MMHG | BODY MASS INDEX: 28.54 KG/M2 | SYSTOLIC BLOOD PRESSURE: 152 MMHG | WEIGHT: 165 LBS | HEART RATE: 76 BPM

## 2025-08-13 DIAGNOSIS — E11.29 TYPE 2 DIABETES MELLITUS WITH MICROALBUMINURIA, WITHOUT LONG-TERM CURRENT USE OF INSULIN (H): ICD-10-CM

## 2025-08-13 DIAGNOSIS — R80.9 TYPE 2 DIABETES MELLITUS WITH MICROALBUMINURIA, WITHOUT LONG-TERM CURRENT USE OF INSULIN (H): Primary | ICD-10-CM

## 2025-08-13 DIAGNOSIS — R80.9 TYPE 2 DIABETES MELLITUS WITH MICROALBUMINURIA, WITHOUT LONG-TERM CURRENT USE OF INSULIN (H): ICD-10-CM

## 2025-08-13 DIAGNOSIS — E11.29 TYPE 2 DIABETES MELLITUS WITH MICROALBUMINURIA, WITHOUT LONG-TERM CURRENT USE OF INSULIN (H): Primary | ICD-10-CM

## 2025-08-13 PROCEDURE — G2211 COMPLEX E/M VISIT ADD ON: HCPCS | Performed by: INTERNAL MEDICINE

## 2025-08-13 PROCEDURE — 3079F DIAST BP 80-89 MM HG: CPT | Performed by: INTERNAL MEDICINE

## 2025-08-13 PROCEDURE — 3077F SYST BP >= 140 MM HG: CPT | Performed by: INTERNAL MEDICINE

## 2025-08-13 PROCEDURE — 3051F HG A1C>EQUAL 7.0%<8.0%: CPT | Performed by: INTERNAL MEDICINE

## 2025-08-13 PROCEDURE — 99214 OFFICE O/P EST MOD 30 MIN: CPT | Performed by: INTERNAL MEDICINE

## 2025-08-13 RX ORDER — FUROSEMIDE 20 MG/1
20 TABLET ORAL
COMMUNITY
Start: 2024-12-13

## 2025-08-13 RX ORDER — TIMOLOL MALEATE 5 MG/ML
1 SOLUTION/ DROPS OPHTHALMIC 2 TIMES DAILY
COMMUNITY
Start: 2025-08-05

## 2025-08-13 RX ORDER — SEMAGLUTIDE 1.34 MG/ML
INJECTION, SOLUTION SUBCUTANEOUS
Qty: 4.5 ML | Refills: 5 | Status: SHIPPED | OUTPATIENT
Start: 2025-08-13

## 2025-08-25 DIAGNOSIS — E11.29 TYPE 2 DIABETES MELLITUS WITH MICROALBUMINURIA, WITHOUT LONG-TERM CURRENT USE OF INSULIN (H): ICD-10-CM

## 2025-08-25 DIAGNOSIS — R80.9 TYPE 2 DIABETES MELLITUS WITH MICROALBUMINURIA, WITHOUT LONG-TERM CURRENT USE OF INSULIN (H): ICD-10-CM

## 2025-08-27 RX ORDER — GLIMEPIRIDE 2 MG/1
TABLET ORAL
Qty: 7 TABLET | Refills: 1 | OUTPATIENT
Start: 2025-08-27